# Patient Record
Sex: FEMALE | Race: WHITE | Employment: PART TIME | URBAN - METROPOLITAN AREA
[De-identification: names, ages, dates, MRNs, and addresses within clinical notes are randomized per-mention and may not be internally consistent; named-entity substitution may affect disease eponyms.]

---

## 2022-10-19 ENCOUNTER — OFFICE VISIT (OUTPATIENT)
Dept: INTERNAL MEDICINE CLINIC | Age: 42
End: 2022-10-19
Payer: MEDICAID

## 2022-10-19 VITALS
OXYGEN SATURATION: 96 % | HEART RATE: 81 BPM | HEIGHT: 62 IN | SYSTOLIC BLOOD PRESSURE: 126 MMHG | BODY MASS INDEX: 45.08 KG/M2 | DIASTOLIC BLOOD PRESSURE: 82 MMHG | WEIGHT: 245 LBS

## 2022-10-19 DIAGNOSIS — R42 DIZZINESS: ICD-10-CM

## 2022-10-19 DIAGNOSIS — E07.9 LUMP IN THYROID: ICD-10-CM

## 2022-10-19 DIAGNOSIS — M79.89 LEG SWELLING: Primary | ICD-10-CM

## 2022-10-19 DIAGNOSIS — Z12.31 ENCOUNTER FOR SCREENING MAMMOGRAM FOR MALIGNANT NEOPLASM OF BREAST: ICD-10-CM

## 2022-10-19 DIAGNOSIS — Z13.220 SCREENING FOR HYPERLIPIDEMIA: ICD-10-CM

## 2022-10-19 PROCEDURE — 99204 OFFICE O/P NEW MOD 45 MIN: CPT | Performed by: INTERNAL MEDICINE

## 2022-10-19 RX ORDER — IBUPROFEN 800 MG/1
800 TABLET ORAL
COMMUNITY
Start: 2021-11-05 | End: 2022-10-31

## 2022-10-19 RX ORDER — TOBRAMYCIN 3 MG/ML
SOLUTION/ DROPS OPHTHALMIC
COMMUNITY
Start: 2022-09-21

## 2022-10-19 ASSESSMENT — PATIENT HEALTH QUESTIONNAIRE - PHQ9
SUM OF ALL RESPONSES TO PHQ QUESTIONS 1-9: 0
SUM OF ALL RESPONSES TO PHQ QUESTIONS 1-9: 0
1. LITTLE INTEREST OR PLEASURE IN DOING THINGS: 0
SUM OF ALL RESPONSES TO PHQ QUESTIONS 1-9: 0
2. FEELING DOWN, DEPRESSED OR HOPELESS: 0
SUM OF ALL RESPONSES TO PHQ9 QUESTIONS 1 & 2: 0
SUM OF ALL RESPONSES TO PHQ QUESTIONS 1-9: 0

## 2022-10-19 NOTE — PROGRESS NOTES
Visit Information    Have you changed or started any medications since your last visit including any over-the-counter medicines, vitamins, or herbal medicines? no   Are you having any side effects from any of your medications? -  no  Have you stopped taking any of your medications? Is so, why? -  no    Have you seen any other physician or provider since your last visit? No  Have you had any other diagnostic tests since your last visit? No  Have you been seen in the emergency room and/or had an admission to a hospital since we last saw you? No  Have you had your routine dental cleaning in the past 6 months? no    Have you activated your [employer] account? If not, what are your barriers?  No:      Patient Care Team:  Kathy Alas MD as PCP - General (Internal Medicine)    Medical History Review  Past Medical, Family, and Social History reviewed and does contribute to the patient presenting condition    Health Maintenance   Topic Date Due    COVID-19 Vaccine (1) Never done    Varicella vaccine (1 of 2 - 2-dose childhood series) Never done    Depression Screen  Never done    HIV screen  Never done    Hepatitis C screen  Never done    DTaP/Tdap/Td vaccine (1 - Tdap) Never done    Cervical cancer screen  Never done    Lipids  Never done    Flu vaccine (1) Never done    Hepatitis A vaccine  Aged Out    Hib vaccine  Aged Out    Meningococcal (ACWY) vaccine  Aged Out    Pneumococcal 0-64 years Vaccine  Aged Out     SUBJECTIVE:  Naya  is a 43 y.o. female who  comes for complaints of   Chief Complaint   Patient presents with    New Patient     Been feeling dizzy and having SOB for a week    Leg Swelling     Both legs and are in pain feel like someone is squeezing them up to thighs, started months ago, hurts to walk       Specialities pt is following with:  Ortho- left knee OA, meniscus tear      Chronic conditions being monitored:    Fibromyalgia  Not on med    Concerns today:    Leg stiffness  swelling  Duration-1mth  Severity-moderate  Context-h/o OA and meniscus tear let kn  Associated signs and symptoms- SOB, dizziness- 1wk  Dizzy and off balance - lighthededness - 1 wek, no vertigo  No CP./palpitaion  Blurry vision right eye- fw mths- seen ophtha yesterday- diagnosed with eye infection from her contacts.- has follow up tomorrow. Modifying factors-worse when standing for a long time      Alcohol/smoking-   none    REVIEW OF SYSTEMS (except Subjective (HPI))    CONSTITUTIONAL: No weight loss, malaise or fevers  HEENT: Negative for frequent or significant headaches, No changes in hearing or vision, no nose bleeds or other nasal problems,  NECK: Negative for lumps, goiter, pain and significant neck swelling  RESPIRATORY: Negative for cough, hemoptysis, wheezing, or shortness of breath  CARDIOVASCULAR: Negative for chest pain, leg swelling,or palpitations  GI: No nausea, vomiting, or diarrhea or Constipation  : No history of dysuria, frequency or incontinence, or hematuria  MUSCULOSKELETAL: Negative for joint pain or swelling  SKIN: Negative for lesions, rash, and itching  PSYCH: Negative for sleep disturbance, mood disorder and recent psychosocial stressors  NEURO: No history of headaches, syncope, paralysis, seizures or tremors    ACTIVE PROBLEM LIST  There is no problem list on file for this patient. PAST MEDICAL HISTORY:    No past medical history on file. PAST SURGICAL HISTORY:    No past surgical history on file. FAMILY HISTORY:    No family history on file.      SOCIAL HISTORY:    Social History     Socioeconomic History    Marital status: Single     Spouse name: Not on file    Number of children: Not on file    Years of education: Not on file    Highest education level: Not on file   Occupational History    Not on file   Tobacco Use    Smoking status: Never    Smokeless tobacco: Never   Substance and Sexual Activity    Alcohol use: Not on file    Drug use: Not on file Sexual activity: Not on file   Other Topics Concern    Not on file   Social History Narrative    Not on file     Social Determinants of Health     Financial Resource Strain: Not on file   Food Insecurity: Not on file   Transportation Needs: Not on file   Physical Activity: Not on file   Stress: Not on file   Social Connections: Not on file   Intimate Partner Violence: Not on file   Housing Stability: Not on file       CURRENT MEDICATIONS:  Current Outpatient Medications   Medication Sig Dispense Refill    ibuprofen (ADVIL;MOTRIN) 800 MG tablet Take 800 mg by mouth every morning (before breakfast)      tobramycin (TOBREX) 0.3 % ophthalmic solution Instill 1 drop in right eye three times a day       No current facility-administered medications for this visit. OBJECTIVE:  Vitals:    10/19/22 1513   BP: 126/82   Pulse: 81   SpO2: 96%       Focal exam:  Thyroid lump- nn tender on the right onpalpation  General exam (except above):  Constitutional - well appearing, alert, in no acute distress  Eyes: Anicteric sclera. Pupils are equally round and reactive to light. Extraocular movements are intact. Skin: Skin color, texture, turgor normal  Respiratory - Lungs clear to auscultation. No wheezing, rhonchi, rales  Cardiovascular - RRR. S1S2 present. No leg swelling. Gastrointestinal - Abdomen soft, non-tender. Bowel sounds normal. No masses, organomegaly  Musculoskeletal - No joint swelling, deformity, or tenderness  Neuro - Pt Alert, awake and oriented x 3. No gross focal neurological deficits      ASSESSMENT AND PLAN (MEDICAL DECISION MAKING):  Irma Spencer was seen today for new patient and leg swelling. Diagnoses and all orders for this visit:    Leg swelling  Comments:  pt reports she has beentaking regular moril 200mg QID for last severla mth- check renal function  Orders:  -     Hemoglobin A1C; Future  -     Brain Natriuretic Peptide; Future  -     CBC; Future  -     Comprehensive Metabolic Panel;  Future  - Glucose, Fasting; Future  -     TSH With Reflex Ft4; Future    Screening for hyperlipidemia  -     Lipid Panel; Future    Dizziness  -     Hemoglobin A1C; Future  -     Brain Natriuretic Peptide; Future  -     CBC; Future  -     Comprehensive Metabolic Panel; Future  -     Glucose, Fasting; Future  -     TSH With Reflex Ft4; Future    Encounter for screening mammogram for malignant neoplasm of breast  -     [facility]ISON DIGITAL SCREEN W OR WO CAD BILATERAL;  Future    Lump in thyroid  -     US HEAD NECK SOFT TISSUE THYROID; Future          Follow up in: Keshia Roberts MD

## 2022-10-24 ENCOUNTER — HOSPITAL ENCOUNTER (OUTPATIENT)
Age: 42
Setting detail: SPECIMEN
Discharge: HOME | End: 2022-10-24

## 2022-10-24 DIAGNOSIS — R42 DIZZINESS: ICD-10-CM

## 2022-10-24 DIAGNOSIS — Z13.220 SCREENING FOR HYPERLIPIDEMIA: ICD-10-CM

## 2022-10-24 DIAGNOSIS — M79.89 LEG SWELLING: ICD-10-CM

## 2022-10-24 LAB
ALBUMIN SERPL-MCNC: 4.2 G/DL (ref 3.5–5.2)
ALBUMIN/GLOBULIN RATIO: 1.1 (ref 1–2.5)
ALP BLD-CCNC: 71 U/L (ref 35–104)
ALT SERPL-CCNC: 17 U/L (ref 5–33)
ANION GAP SERPL CALCULATED.3IONS-SCNC: 10 MMOL/L (ref 9–17)
AST SERPL-CCNC: 19 U/L
BILIRUB SERPL-MCNC: 0.5 MG/DL (ref 0.3–1.2)
BUN BLDV-MCNC: 15 MG/DL (ref 6–20)
CALCIUM SERPL-MCNC: 9.7 MG/DL (ref 8.6–10.4)
CHLORIDE BLD-SCNC: 100 MMOL/L (ref 98–107)
CHOLESTEROL/HDL RATIO: 5.6
CHOLESTEROL: 211 MG/DL
CO2: 24 MMOL/L (ref 20–31)
CREAT SERPL-MCNC: 0.63 MG/DL (ref 0.5–0.9)
ESTIMATED AVERAGE GLUCOSE: 108 MG/DL
GFR SERPL CREATININE-BSD FRML MDRD: >60 ML/MIN/1.73M2
GLUCOSE BLD-MCNC: 103 MG/DL (ref 70–99)
GLUCOSE FASTING: 103 MG/DL (ref 70–99)
HBA1C MFR BLD: 5.4 % (ref 4–6)
HCT VFR BLD CALC: 43.8 % (ref 36.3–47.1)
HDLC SERPL-MCNC: 38 MG/DL
HEMOGLOBIN: 14 G/DL (ref 11.9–15.1)
LDL CHOLESTEROL: 137 MG/DL (ref 0–130)
MCH RBC QN AUTO: 28.7 PG (ref 25.2–33.5)
MCHC RBC AUTO-ENTMCNC: 32 G/DL (ref 28.4–34.8)
MCV RBC AUTO: 89.8 FL (ref 82.6–102.9)
NRBC AUTOMATED: 0 PER 100 WBC
PDW BLD-RTO: 13.1 % (ref 11.8–14.4)
PLATELET # BLD: 451 K/UL (ref 138–453)
PMV BLD AUTO: 9.5 FL (ref 8.1–13.5)
POTASSIUM SERPL-SCNC: 4.7 MMOL/L (ref 3.7–5.3)
PRO-BNP: 47 PG/ML
RBC # BLD: 4.88 M/UL (ref 3.95–5.11)
SODIUM BLD-SCNC: 134 MMOL/L (ref 135–144)
TOTAL PROTEIN: 8 G/DL (ref 6.4–8.3)
TRIGL SERPL-MCNC: 182 MG/DL
TSH SERPL DL<=0.05 MIU/L-ACNC: 3.6 UIU/ML (ref 0.3–5)
WBC # BLD: 8.8 K/UL (ref 3.5–11.3)

## 2022-10-25 NOTE — RESULT ENCOUNTER NOTE
Fasting sugar borderline high-in prediabetes range, total cholesterol also little high, no need for medication for this at the moment  Recommend healthy diet and regular exercise

## 2022-10-26 ENCOUNTER — OFFICE VISIT (OUTPATIENT)
Dept: INTERNAL MEDICINE CLINIC | Age: 42
End: 2022-10-26
Payer: MEDICAID

## 2022-10-26 VITALS
WEIGHT: 243 LBS | DIASTOLIC BLOOD PRESSURE: 78 MMHG | HEIGHT: 62 IN | SYSTOLIC BLOOD PRESSURE: 128 MMHG | OXYGEN SATURATION: 98 % | HEART RATE: 78 BPM | BODY MASS INDEX: 44.72 KG/M2

## 2022-10-26 DIAGNOSIS — E78.2 MODERATE MIXED HYPERLIPIDEMIA NOT REQUIRING STATIN THERAPY: ICD-10-CM

## 2022-10-26 DIAGNOSIS — R73.01 ELEVATED FASTING GLUCOSE: ICD-10-CM

## 2022-10-26 DIAGNOSIS — M79.89 LEG SWELLING: Primary | ICD-10-CM

## 2022-10-26 PROCEDURE — 99213 OFFICE O/P EST LOW 20 MIN: CPT | Performed by: INTERNAL MEDICINE

## 2022-10-26 PROCEDURE — 93970 EXTREMITY STUDY: CPT | Performed by: INTERNAL MEDICINE

## 2022-10-26 RX ORDER — PREDNISOLONE ACETATE 10 MG/ML
SUSPENSION/ DROPS OPHTHALMIC
COMMUNITY
Start: 2022-10-21

## 2022-10-26 RX ORDER — ACETAMINOPHEN AND CODEINE PHOSPHATE 300; 30 MG/1; MG/1
TABLET ORAL
COMMUNITY
Start: 2022-10-23

## 2022-10-26 RX ORDER — AZITHROMYCIN 250 MG/1
TABLET, FILM COATED ORAL
COMMUNITY
Start: 2022-10-23

## 2022-10-26 SDOH — ECONOMIC STABILITY: FOOD INSECURITY: WITHIN THE PAST 12 MONTHS, YOU WORRIED THAT YOUR FOOD WOULD RUN OUT BEFORE YOU GOT MONEY TO BUY MORE.: NEVER TRUE

## 2022-10-26 SDOH — ECONOMIC STABILITY: FOOD INSECURITY: WITHIN THE PAST 12 MONTHS, THE FOOD YOU BOUGHT JUST DIDN'T LAST AND YOU DIDN'T HAVE MONEY TO GET MORE.: NEVER TRUE

## 2022-10-26 ASSESSMENT — SOCIAL DETERMINANTS OF HEALTH (SDOH): HOW HARD IS IT FOR YOU TO PAY FOR THE VERY BASICS LIKE FOOD, HOUSING, MEDICAL CARE, AND HEATING?: NOT HARD AT ALL

## 2022-10-26 NOTE — PROGRESS NOTES
Visit Information    Have you changed or started any medications since your last visit including any over-the-counter medicines, vitamins, or herbal medicines? no   Are you having any side effects from any of your medications? -  no  Have you stopped taking any of your medications? Is so, why? -  no    Have you seen any other physician or provider since your last visit? No  Have you had any other diagnostic tests since your last visit? No  Have you been seen in the emergency room and/or had an admission to a hospital since we last saw you? No  Have you had your routine dental cleaning in the past 6 months? no    Have you activated your [employer] account? If not, what are your barriers? No:      Patient Care Team:  Missy Valdez MD as PCP - General (Internal Medicine)  Missy Valdez MD as PCP - [facility]    Medical History Review  Past Medical, Family, and Social History reviewed and does contribute to the patient presenting condition    Health Maintenance   Topic Date Due    COVID-19 Vaccine (1) Never done    Varicella vaccine (1 of 2 - 2-dose childhood series) Never done    HIV screen  Never done    Hepatitis C screen  Never done    DTaP/Tdap/Td vaccine (1 - Tdap) Never done    Cervical cancer screen  Never done    Flu vaccine (1) Never done    Depression Screen  10/19/2023    Diabetes screen  10/24/2025    Lipids  10/24/2027    Hepatitis A vaccine  Aged Out    Hib vaccine  Aged Out    Meningococcal (ACWY) vaccine  Aged Out    Pneumococcal 0-64 years Vaccine  Aged Out       SUBJECTIVE:  Laurie Gomez is a 43 y.o. female patient who  comes for complaints of   Chief Complaint   Patient presents with    Leg Swelling    Discuss Labs     Leg swelling- see media  Looks like fat/ muscle pad  Pt reports unchanged in last 6mth  She has lost some weight- likey just more noticeable now.    Feels some calf muscl pulling sensatio when she stands      Here for lab results  Elevated fasting sugars- 103  A1c is 5.4  Discussed diet, exercise and annual monitoring      Hyperlipidemia    10 yr risk of AHA - 1.37- no need for meds at now   Discussed diet and exercise. Other labs good  BNP is normal  Legs still swollen        REVIEW OF SYSTEMS (except Subjective (HPI))  GENERAL: No fevers / chills  RESPIRATORY: Negative for cough, wheezing or shortness of breath  CARDIOVASCULAR: Negative for chest pain or palpitations. GI: no nausea, vomiting, or diarrhea  NEURO: No history of headaches    No past medical history on file.     SOCIAL HISTORY:  Social History     Socioeconomic History    Marital status: Single     Spouse name: Not on file    Number of children: Not on file    Years of education: Not on file    Highest education level: Not on file   Occupational History    Not on file   Tobacco Use    Smoking status: Never    Smokeless tobacco: Never   Substance and Sexual Activity    Alcohol use: Not on file    Drug use: Not on file    Sexual activity: Not on file   Other Topics Concern    Not on file   Social History Narrative    Not on file     Social Determinants of Health     Financial Resource Strain: Low Risk     Difficulty of Paying Living Expenses: Not hard at all   Food Insecurity: No Food Insecurity    Worried About Running Out of Food in the Last Year: Never true    Ran Out of Food in the Last Year: Never true   Transportation Needs: Not on file   Physical Activity: Not on file   Stress: Not on file   Social Connections: Not on file   Intimate Partner Violence: Not on file   Housing Stability: Not on file           CURRENT MEDICATIONS:  Current Outpatient Medications   Medication Sig Dispense Refill    acetaminophen-codeine (TYLENOL #3) 300-30 MG per tablet TAKE 1 TABLET BY MOUTH EVERY FOUR TO SIX HOURS AS NEEDED      azithromycin (ZITHROMAX) 250 MG tablet TAKE 2 TABLETS BY MOUTH ON DAY 1, THEN TAKE 1 TABLET BY MOUTH DAILY DAYS 2 THRU 5      prednisoLONE acetate (PRED FORTE) 1 % ophthalmic suspension INSTILL 1 DROP TWO TIMES A DAY IN RIGHT EYE      tobramycin (TOBREX) 0.3 % ophthalmic solution Instill 1 drop in right eye three times a day      ibuprofen (ADVIL;MOTRIN) 800 MG tablet Take 800 mg by mouth every morning (before breakfast) (Patient not taking: Reported on 10/26/2022)       No current facility-administered medications for this visit. OBJECTIVE:  Vitals:    10/26/22 1319   BP: 128/78   Pulse: 78   SpO2: 98%     Body mass index is 44.45 kg/m². General exam (except above):  General appearance - well appearing, alert, in no acute distress  Head - Atraumatic, normocephalic  Eyes - EOMI, no jaundice or pallor  Lungs - Lungs clear to auscultation. No wheezing, rhonchi, rales  Heart - RRR without murmur, gallop, or rubs. No ectopy  Abdomen - Abdomen soft, non-tender. Bowel sounds normal. No masses, organomegaly  Extremities -No significant edema, or skin discoloration. Good capillary refill. Neuro - Pt Alert, awake and oriented x 3. No gross focal neurological deficits    ASSESSMENT AND PLAN (MEDICAL DECISION MAKING):     Rosa Trammell was seen today for leg swelling and discuss labs.     Diagnoses and all orders for this visit:    Leg swelling  -     89033 - IN Duplex Extrem Venous, Bilat    Elevated fasting glucose    Moderate mixed hyperlipidemia not requiring statin therapy     Dizziness resolved    Follow up in: Dane Andrade MD

## 2022-11-11 ENCOUNTER — HOSPITAL ENCOUNTER (OUTPATIENT)
Dept: VASCULAR LAB | Age: 42
Discharge: HOME | End: 2022-11-11
Payer: MEDICAID

## 2022-11-11 ENCOUNTER — HOSPITAL ENCOUNTER (OUTPATIENT)
Dept: ULTRASOUND IMAGING | Age: 42
Discharge: HOME | End: 2022-11-13
Payer: MEDICAID

## 2022-11-11 DIAGNOSIS — M79.89 LEG SWELLING: ICD-10-CM

## 2022-11-11 DIAGNOSIS — E07.9 LUMP IN THYROID: ICD-10-CM

## 2022-11-11 PROCEDURE — 76536 US EXAM OF HEAD AND NECK: CPT

## 2022-11-11 PROCEDURE — 93970 EXTREMITY STUDY: CPT

## 2022-12-05 ENCOUNTER — HOSPITAL ENCOUNTER (OUTPATIENT)
Dept: WOMENS IMAGING | Age: 42
Discharge: HOME | End: 2022-12-07
Payer: MEDICAID

## 2022-12-05 DIAGNOSIS — Z12.31 ENCOUNTER FOR SCREENING MAMMOGRAM FOR MALIGNANT NEOPLASM OF BREAST: ICD-10-CM

## 2022-12-05 PROCEDURE — 77063 BREAST TOMOSYNTHESIS BI: CPT

## 2023-03-15 ENCOUNTER — HOSPITAL ENCOUNTER (OUTPATIENT)
Dept: GENERAL RADIOLOGY | Facility: CLINIC | Age: 43
Discharge: HOME | End: 2023-03-17
Payer: COMMERCIAL

## 2023-03-15 ENCOUNTER — OFFICE VISIT (OUTPATIENT)
Dept: INTERNAL MEDICINE CLINIC | Age: 43
End: 2023-03-15
Payer: COMMERCIAL

## 2023-03-15 ENCOUNTER — HOSPITAL ENCOUNTER (OUTPATIENT)
Facility: CLINIC | Age: 43
Discharge: HOME | End: 2023-03-17
Payer: COMMERCIAL

## 2023-03-15 VITALS
BODY MASS INDEX: 45.82 KG/M2 | HEART RATE: 93 BPM | WEIGHT: 249 LBS | DIASTOLIC BLOOD PRESSURE: 78 MMHG | SYSTOLIC BLOOD PRESSURE: 128 MMHG | OXYGEN SATURATION: 98 % | HEIGHT: 62 IN

## 2023-03-15 DIAGNOSIS — M25.561 CHRONIC PAIN OF RIGHT KNEE: ICD-10-CM

## 2023-03-15 DIAGNOSIS — G89.29 CHRONIC PAIN OF RIGHT KNEE: ICD-10-CM

## 2023-03-15 DIAGNOSIS — B37.31 VAGINAL THRUSH: ICD-10-CM

## 2023-03-15 DIAGNOSIS — J01.00 ACUTE NON-RECURRENT MAXILLARY SINUSITIS: Primary | ICD-10-CM

## 2023-03-15 PROCEDURE — 99214 OFFICE O/P EST MOD 30 MIN: CPT | Performed by: INTERNAL MEDICINE

## 2023-03-15 PROCEDURE — 73562 X-RAY EXAM OF KNEE 3: CPT

## 2023-03-15 RX ORDER — FLUCONAZOLE 150 MG/1
150 TABLET ORAL ONCE
Qty: 1 TABLET | Refills: 1 | Status: SHIPPED | OUTPATIENT
Start: 2023-03-15 | End: 2023-03-15

## 2023-03-15 RX ORDER — AMOXICILLIN AND CLAVULANATE POTASSIUM 875; 125 MG/1; MG/1
1 TABLET, FILM COATED ORAL 2 TIMES DAILY
Qty: 14 TABLET | Refills: 0 | Status: SHIPPED | OUTPATIENT
Start: 2023-03-15 | End: 2023-03-22

## 2023-03-15 RX ORDER — FLUTICASONE PROPIONATE 50 MCG
1 SPRAY, SUSPENSION (ML) NASAL DAILY
Qty: 32 G | Refills: 1 | Status: SHIPPED | OUTPATIENT
Start: 2023-03-15

## 2023-03-15 SDOH — ECONOMIC STABILITY: INCOME INSECURITY: HOW HARD IS IT FOR YOU TO PAY FOR THE VERY BASICS LIKE FOOD, HOUSING, MEDICAL CARE, AND HEATING?: NOT HARD AT ALL

## 2023-03-15 SDOH — ECONOMIC STABILITY: FOOD INSECURITY: WITHIN THE PAST 12 MONTHS, THE FOOD YOU BOUGHT JUST DIDN'T LAST AND YOU DIDN'T HAVE MONEY TO GET MORE.: NEVER TRUE

## 2023-03-15 SDOH — ECONOMIC STABILITY: HOUSING INSECURITY
IN THE LAST 12 MONTHS, WAS THERE A TIME WHEN YOU DID NOT HAVE A STEADY PLACE TO SLEEP OR SLEPT IN A SHELTER (INCLUDING NOW)?: NO

## 2023-03-15 SDOH — ECONOMIC STABILITY: FOOD INSECURITY: WITHIN THE PAST 12 MONTHS, YOU WORRIED THAT YOUR FOOD WOULD RUN OUT BEFORE YOU GOT MONEY TO BUY MORE.: NEVER TRUE

## 2023-03-15 ASSESSMENT — PATIENT HEALTH QUESTIONNAIRE - PHQ9
SUM OF ALL RESPONSES TO PHQ QUESTIONS 1-9: 0
2. FEELING DOWN, DEPRESSED OR HOPELESS: 0
SUM OF ALL RESPONSES TO PHQ QUESTIONS 1-9: 0
SUM OF ALL RESPONSES TO PHQ QUESTIONS 1-9: 0
1. LITTLE INTEREST OR PLEASURE IN DOING THINGS: 0
SUM OF ALL RESPONSES TO PHQ9 QUESTIONS 1 & 2: 0
SUM OF ALL RESPONSES TO PHQ QUESTIONS 1-9: 0

## 2023-03-15 NOTE — PROGRESS NOTES
Visit Information    Have you changed or started any medications since your last visit including any over-the-counter medicines, vitamins, or herbal medicines? no   Are you having any side effects from any of your medications? -  no  Have you stopped taking any of your medications? Is so, why? -  no    Have you seen any other physician or provider since your last visit? No  Have you had any other diagnostic tests since your last visit? No  Have you been seen in the emergency room and/or had an admission to a hospital since we last saw you? No  Have you had your routine dental cleaning in the past 6 months? no    Have you activated your [employer] account? If not, what are your barriers?  No:      Patient Care Team:  Brendolyn Rinne, MD as PCP - General (Internal Medicine)  Brendolyn Rinne, MD as PCP - Empaneled Provider    Medical History Review  Past Medical, Family, and Social History reviewed and does contribute to the patient presenting condition    Health Maintenance   Topic Date Due    COVID-19 Vaccine (1) Never done    Varicella vaccine (1 of 2 - 2-dose childhood series) Never done    HIV screen  Never done    Hepatitis C screen  Never done    DTaP/Tdap/Td vaccine (1 - Tdap) Never done    Cervical cancer screen  Never done    Flu vaccine (1) Never done    Depression Screen  10/19/2023    A1C test (Diabetic or Prediabetic)  10/24/2023    Lipids  10/24/2027    Hepatitis A vaccine  Aged Out    Hib vaccine  Aged Out    Meningococcal (ACWY) vaccine  Aged Out    Pneumococcal 0-64 years Vaccine  Aged Out     SUBJECTIVE:  Myrna Ardon is a 43 y.o. female patient who  comes for complaints of   Chief Complaint   Patient presents with    Results    Sinus Problem     Results  Us thyroid - tiny 5mm cst  Asymptomatic   Observation only    Soret throat,   1mth  Sometimes noted clumps of blood on blowing nose improved but still persisting  No fever, no cough  Periorbital heaviness nad tenderness  No SOB/CP      REVIEW OF SYSTEMS (except Subjective (HPI))  GENERAL: No fevers / chills  RESPIRATORY: Negative for cough, wheezing or shortness of breath  CARDIOVASCULAR: Negative for chest pain or palpitations.  GI: no nausea, vomiting, or diarrhea  NEURO: No history of headaches    No past medical history on file.    SOCIAL HISTORY:  Social History     Socioeconomic History    Marital status: Single     Spouse name: Not on file    Number of children: Not on file    Years of education: Not on file    Highest education level: Not on file   Occupational History    Not on file   Tobacco Use    Smoking status: Never    Smokeless tobacco: Never   Substance and Sexual Activity    Alcohol use: Not on file    Drug use: Not on file    Sexual activity: Not on file   Other Topics Concern    Not on file   Social History Narrative    Not on file     Social Determinants of Health     Financial Resource Strain: Low Risk     Difficulty of Paying Living Expenses: Not hard at all   Food Insecurity: No Food Insecurity    Worried About Running Out of Food in the Last Year: Never true    Ran Out of Food in the Last Year: Never true   Transportation Needs: Unknown    Lack of Transportation (Medical): Not on file    Lack of Transportation (Non-Medical): No   Physical Activity: Not on file   Stress: Not on file   Social Connections: Not on file   Intimate Partner Violence: Not on file   Housing Stability: Unknown    Unable to Pay for Housing in the Last Year: Not on file    Number of Places Lived in the Last Year: Not on file    Unstable Housing in the Last Year: No           CURRENT MEDICATIONS:  Current Outpatient Medications   Medication Sig Dispense Refill    acetaminophen-codeine (TYLENOL #3) 300-30 MG per tablet TAKE 1 TABLET BY MOUTH EVERY FOUR TO SIX HOURS AS NEEDED      azithromycin (ZITHROMAX) 250 MG tablet TAKE 2 TABLETS BY MOUTH ON DAY 1, THEN TAKE 1 TABLET BY MOUTH DAILY DAYS 2 THRU 5      prednisoLONE acetate (PRED FORTE) 1 % ophthalmic suspension INSTILL 1  DROP TWO TIMES A DAY IN RIGHT EYE      tobramycin (TOBREX) 0.3 % ophthalmic solution Instill 1 drop in right eye three times a day      ibuprofen (ADVIL;MOTRIN) 800 MG tablet Take 800 mg by mouth every morning (before breakfast) (Patient not taking: No sig reported)       No current facility-administered medications for this visit. OBJECTIVE:  Vitals:    03/15/23 1612   BP: 128/78   Pulse: 93   SpO2: 98%     Body mass index is 45.54 kg/m². Focal exam:  Throat - normal  Left maxillary sinus tenderness  Chest clear    General exam (except above):  General appearance - well appearing, alert, in no acute distress  Head - Atraumatic, normocephalic  Eyes - EOMI, no jaundice or pallor  Lungs - Lungs clear to auscultation. No wheezing, rhonchi, rales  Heart - RRR without murmur, gallop, or rubs. No ectopy  Abdomen - Abdomen soft, non-tender. Bowel sounds normal. No masses, organomegaly  Extremities -No significant edema, or skin discoloration. Good capillary refill. Neuro - Pt Alert, awake and oriented x 3. No gross focal neurological deficits    ASSESSMENT AND PLAN (MEDICAL DECISION MAKING):     Jatinder Caruso was seen today for results and sinus problem. Diagnoses and all orders for this visit:    Acute non-recurrent maxillary sinusitis  -     amoxicillin-clavulanate (AUGMENTIN) 875-125 MG per tablet; Take 1 tablet by mouth 2 times daily for 7 days  -     fluticasone (FLONASE) 50 MCG/ACT nasal spray; 1 spray by Each Nostril route daily    Chronic pain of right knee  Comments:  many years, worsening   Orders:  -     XR KNEE RIGHT (3 VIEWS); Future  -     Mindy Suarez MD, Orthopedic Surgery, [place]    Vaginal thrush  -     fluconazole (DIFLUCAN) 150 MG tablet;  Take 1 tablet by mouth once for 1 dose       Follow up in: prn      Jaqueline Christianson MD

## 2023-03-17 NOTE — RESULT ENCOUNTER NOTE
Long-term degenerative changes of the right knee, with small amount of fluid which is related to osteoarthritis

## 2023-03-23 ENCOUNTER — OFFICE VISIT (OUTPATIENT)
Dept: ORTHOPEDIC SURGERY | Age: 43
End: 2023-03-23

## 2023-03-23 VITALS — RESPIRATION RATE: 14 BRPM | HEIGHT: 62 IN | BODY MASS INDEX: 45.08 KG/M2 | WEIGHT: 245 LBS

## 2023-03-23 DIAGNOSIS — M25.561 CHRONIC PAIN OF RIGHT KNEE: Primary | ICD-10-CM

## 2023-03-23 DIAGNOSIS — M17.11 PRIMARY OSTEOARTHRITIS OF RIGHT KNEE: ICD-10-CM

## 2023-03-23 DIAGNOSIS — G89.29 CHRONIC PAIN OF RIGHT KNEE: Primary | ICD-10-CM

## 2023-03-23 RX ORDER — FLUCONAZOLE 150 MG/1
150 TABLET ORAL ONCE
COMMUNITY
Start: 2023-03-15

## 2023-03-23 RX ORDER — CYCLOBENZAPRINE HCL 10 MG
10 TABLET ORAL NIGHTLY PRN
Qty: 30 TABLET | Refills: 0 | Status: CANCELLED | OUTPATIENT
Start: 2023-03-23 | End: 2023-04-02

## 2023-03-23 NOTE — LETTER
March 23, 2023       Quentin Cosme YOB: 1980   [address] Date of Visit:  3/23/2023       To Whom It May Concern: It is my medical opinion that Quentin Cosme should remain off work through Earl 3/26/23. She may return to work on Monday 3/27/2023 with no restrictions. If you have any questions or concerns, please don't hesitate to call.     Sincerely,        VELVET Stephens

## 2023-03-23 NOTE — PROGRESS NOTES
[facility], [address] [place] [address], [address] [place] [address], [id number] [place] [address]           Dept Phone: [phone]           Dept Fax:  [address]           Nelli Woodard          Dept Phone: [phone]           Dept Fax:  [phone]      Chief Compliant:  Chief Complaint   Patient presents with    Knee Pain     Right knee pain Hx: mult falls         History of Present Illness: This is a 43 y.o. female who presents to the clinic today for evaluation of had concerns including Knee Pain (Right knee pain Hx: mult falls ). Ms. Annemarie Baxter is a 3year-old female who presents for evaluation of chronic right knee pain. Patient reports she has had pain in this knee for a number of years but it has been significantly worse than her baseline over the last 3 to 4 months. States she has pain in the [facility] knee\" but greatest over the medial aspect. Associated with swelling significant limitation in range of motion and concerns for intermittent weakness. Patient reports that her pain does cause the knee to give way and she has had several falls especially with with a wet surfaces but no falls which she landed directly on his right knee. Patient was evaluated PCP who ordered x-rays of the right knee which demonstrated moderate osteoarthritis no evidence of acute fracture. Patient was referred to orthopedics for further evaluation. Patient does not recall any specific injury or trauma states she has had several falls as stated above but had pain well before any of those falls occurred.        Past History:    Current Outpatient Medications:     fluconazole (DIFLUCAN) 150 MG tablet, Take 150 mg by mouth once, Disp: , Rfl:     fluticasone (FLONASE) 50 MCG/ACT nasal spray, 1 spray by Each Nostril route daily, Disp: 32 g, Rfl: 1  Allergies   Allergen Reactions

## 2023-05-17 ENCOUNTER — OFFICE VISIT (OUTPATIENT)
Dept: ORTHOPEDIC SURGERY | Age: 43
End: 2023-05-17
Payer: COMMERCIAL

## 2023-05-17 ENCOUNTER — HOSPITAL ENCOUNTER (OUTPATIENT)
Age: 43
Setting detail: SPECIMEN
Discharge: HOME | End: 2023-05-17
Payer: COMMERCIAL

## 2023-05-17 VITALS — WEIGHT: 245 LBS | RESPIRATION RATE: 14 BRPM | BODY MASS INDEX: 45.08 KG/M2 | HEIGHT: 62 IN

## 2023-05-17 DIAGNOSIS — M25.561 CHRONIC PAIN OF RIGHT KNEE: ICD-10-CM

## 2023-05-17 DIAGNOSIS — M25.50 POLYARTHRALGIA: ICD-10-CM

## 2023-05-17 DIAGNOSIS — M23.91 INTERNAL DERANGEMENT OF MULTIPLE SITES OF RIGHT KNEE: Primary | ICD-10-CM

## 2023-05-17 DIAGNOSIS — G89.29 CHRONIC PAIN OF RIGHT KNEE: ICD-10-CM

## 2023-05-17 LAB
CRP SERPL HS-MCNC: 14 MG/L (ref 0–5)
ERYTHROCYTE [SEDIMENTATION RATE] IN BLOOD BY WESTERGREN METHOD: 28 MM/HR (ref 0–20)
RHEUMATOID FACT SER NEPH-ACNC: <10 IU/ML

## 2023-05-17 PROCEDURE — G8417 CALC BMI ABV UP PARAM F/U: HCPCS | Performed by: PHYSICIAN ASSISTANT

## 2023-05-17 PROCEDURE — 99213 OFFICE O/P EST LOW 20 MIN: CPT | Performed by: PHYSICIAN ASSISTANT

## 2023-05-17 PROCEDURE — 86431 RHEUMATOID FACTOR QUANT: CPT

## 2023-05-17 PROCEDURE — 85652 RBC SED RATE AUTOMATED: CPT

## 2023-05-17 PROCEDURE — 36415 COLL VENOUS BLD VENIPUNCTURE: CPT

## 2023-05-17 PROCEDURE — 86038 ANTINUCLEAR ANTIBODIES: CPT

## 2023-05-17 PROCEDURE — 86140 C-REACTIVE PROTEIN: CPT

## 2023-05-17 PROCEDURE — 86225 DNA ANTIBODY NATIVE: CPT

## 2023-05-17 PROCEDURE — G8427 DOCREV CUR MEDS BY ELIG CLIN: HCPCS | Performed by: PHYSICIAN ASSISTANT

## 2023-05-17 PROCEDURE — 86200 CCP ANTIBODY: CPT

## 2023-05-17 PROCEDURE — 1036F TOBACCO NON-USER: CPT | Performed by: PHYSICIAN ASSISTANT

## 2023-05-17 RX ORDER — MELOXICAM 15 MG/1
15 TABLET ORAL DAILY
Qty: 30 TABLET | Refills: 0 | Status: SHIPPED | OUTPATIENT
Start: 2023-05-17

## 2023-05-17 NOTE — PROGRESS NOTES
and Atraumatic  Nose: Normal  Eyes: Conjunctivae and EOM are normal  Neck: Normal range of motion Neck supple. Respiratory/Cardio: Effort normal. No respiratory distress. Musculoskeletal:    Right Knee:     Skin: warm and dry, no rash or erythema  Vasculature: 2+ pedal pulses bilaterally  Neuro: Sensation grossly intact to light touch diffusely  Alignment: Normal  Tenderness: Moderate tenderness to medial joint line. No tenderness to quad/patellar tendon, pes anserine bursa or posterior knee. Effusion: Trace    ROM: (Degrees)       A P       Extension  0 0       Flexion   115 120       Crepitation  Yes       Muscle strength:         Flexion   5      Extension  5      SLR   5        Extensor lag   y          Special testing:  y    Pain with deep knee flexion     y    Patellar grind       n    Patellar apprehension      n    Patellar glide         n    Lachman       n    Anterior drawer      n    Pivot shift       n    Posterior drawer      n    Dial test       n    Posterolateral drawer      n    Posterior Sag       n    MCL        n    LCL          y    Medial joint line tenderness     n    Lateral joint line tenderness     y    McMurrey's         Neurological: Patient is alert and oriented to person, place, and time. Normal strenght. No sensory deficit. Skin: Skin is warm and dry  Psychiatric: Behavior is normal. Thought content normal.  Nursing note and vitals reviewed. Labs and Imaging:     XR KNEE RIGHT (3 VIEWS)  Narrative: EXAMINATION:  THREE XRAY VIEWS OF THE RIGHT KNEE    3/15/2023 4:58 pm    COMPARISON:  None. HISTORY:  ORDERING SYSTEM PROVIDED HISTORY: Chronic pain of right knee  TECHNOLOGIST PROVIDED HISTORY:  Reason for Exam: pt states chronic right knee pain past injuries with falls  and mvas    FINDINGS:  No acute fracture or dislocation is present. There are mild-to-moderate  degenerative changes of the medial and patellofemoral joint compartments. Small joint effusion is noted.   No

## 2023-05-18 LAB
ANA SER QL IA: NEGATIVE
CCP AB SER IA-ACNC: 1.4 U/ML (ref 0–7)
DSDNA IGG SER QL IA: 5.1 IU/ML
NUCLEAR IGG SER IA-RTO: 0.4 U/ML

## 2023-05-19 ENCOUNTER — TELEPHONE (OUTPATIENT)
Dept: ORTHOPEDIC SURGERY | Age: 43
End: 2023-05-19

## 2023-05-19 DIAGNOSIS — M25.50 POLYARTHRALGIA: Primary | ICD-10-CM

## 2023-05-19 NOTE — TELEPHONE ENCOUNTER
I spoke with patient on results, and referred her to rheumatology. Phone number provided to patient to call for an appointment.

## 2023-05-19 NOTE — TELEPHONE ENCOUNTER
----- Message from Patricia Lawson, [address] sent at 5/19/2023  2:11 PM EDT -----  Sed rate and CRP are mildly elevated.   Based on examination low concern for infectious process however given patient's multiple polyarthralgia would advise referral to rheumatology to rule out autoimmune or inflammatory condition

## 2023-05-22 ENCOUNTER — TELEPHONE (OUTPATIENT)
Dept: ADMINISTRATIVE | Age: 43
End: 2023-05-22

## 2023-05-22 ENCOUNTER — TELEPHONE (OUTPATIENT)
Dept: ORTHOPEDIC SURGERY | Age: 43
End: 2023-05-22

## 2023-05-22 NOTE — TELEPHONE ENCOUNTER
Called patient and let her know to check with her insurance company to see what Rheumatologist is on plan and whom she can get in the quickest.  Then call us back with their name and fax number and we will send over the referral and her notes. Lukas Jang,  Did you fax over all the information to Dr. Raquel Block office? Don't see fax confirmation in chart.

## 2023-05-22 NOTE — TELEPHONE ENCOUNTER
Pt called in regarding her referral she got from [facility] to see Rheumatology.  States she called this office today and they said they did not have any referral from us and the earliest appt they could get the pt in was Nov.    Pt is asking if there was another provider she could be referred to       Please call pt back @ [phone]

## 2023-05-23 NOTE — TELEPHONE ENCOUNTER
I spoke with patient, she stated she will be calling insurance today to see who is covered under her insurance for rheumatology.

## 2023-05-31 ENCOUNTER — HOSPITAL ENCOUNTER (OUTPATIENT)
Dept: MRI IMAGING | Age: 43
Discharge: HOME | End: 2023-06-02
Payer: COMMERCIAL

## 2023-05-31 DIAGNOSIS — M23.91 INTERNAL DERANGEMENT OF MULTIPLE SITES OF RIGHT KNEE: ICD-10-CM

## 2023-05-31 PROCEDURE — 73721 MRI JNT OF LWR EXTRE W/O DYE: CPT

## 2023-06-08 ENCOUNTER — TELEPHONE (OUTPATIENT)
Dept: ORTHOPEDIC SURGERY | Age: 43
End: 2023-06-08

## 2023-06-23 ENCOUNTER — OFFICE VISIT (OUTPATIENT)
Dept: ORTHOPEDIC SURGERY | Age: 43
End: 2023-06-23
Payer: COMMERCIAL

## 2023-06-23 DIAGNOSIS — G89.29 CHRONIC PAIN OF RIGHT KNEE: Primary | ICD-10-CM

## 2023-06-23 DIAGNOSIS — M25.561 CHRONIC PAIN OF RIGHT KNEE: Primary | ICD-10-CM

## 2023-06-23 DIAGNOSIS — M23.91 INTERNAL DERANGEMENT OF MULTIPLE SITES OF RIGHT KNEE: ICD-10-CM

## 2023-06-23 PROCEDURE — G8417 CALC BMI ABV UP PARAM F/U: HCPCS | Performed by: ORTHOPAEDIC SURGERY

## 2023-06-23 PROCEDURE — 1036F TOBACCO NON-USER: CPT | Performed by: ORTHOPAEDIC SURGERY

## 2023-06-23 PROCEDURE — G8428 CUR MEDS NOT DOCUMENT: HCPCS | Performed by: ORTHOPAEDIC SURGERY

## 2023-06-23 PROCEDURE — 99213 OFFICE O/P EST LOW 20 MIN: CPT | Performed by: ORTHOPAEDIC SURGERY

## 2023-06-23 NOTE — PROGRESS NOTES
accordingly    Provider Attestation:  Jakub Caceres, personally performed the services described in this documentation. All medical record entries made by the scribe were at my direction and in my presence. I have reviewed the chart and discharge instructions and agree that the records reflect my personal performance and is accurate and complete. Katrina Barry MD. 06/23/23      Please note that this chart was generated using voice recognition Dragon dictation software. Although every effort was made to ensure the accuracy of this automated transcription, some errors in transcription may have occurred.

## 2023-07-06 NOTE — H&P
treatment : yes   No recent falls or trauma. Currently no redness,  or rashes. Pt denies fever/chills,chest pain or SOB. No Hx of MRSA infections in the past.    RECENT IMAGING R/T HPI     MRI KNEE RIGHT WO CONTRAST [[id number]]  FINDINGS:  MENISCI: Lateral meniscus demonstrates normal morphology and signal  characteristics. No lateral meniscus tear. Degeneration and outward subluxation of the posterior horn and body of the  medial meniscus without discrete tear. CRUCIATE LIGAMENTS: Anterior and posterior cruciate ligaments appear  continuous/intact. EXTENSOR MECHANISM: Mild multifocal patellar tendinosis. Distal quadriceps  tendon and patellar retinacula appear intact. LATERAL COLLATERAL LIGAMENT COMPLEX: Popliteus muscle/tendon, iliotibial  band, lateral collateral ligament, and biceps femoris appear intact. MEDIAL COLLATERAL LIGAMENT COMPLEX: Medial collateral ligament complex  appears continuous/intact. KNEE JOINT: Small joint effusion. Mild tricompartmental osteophyte spurring. Grade 3 medial compartment chondromalacia. Grade 2 lateral compartment chondromalacia. Patellofemoral compartment articular cartilage is preserved. Osseous alignment is normal.     No acute fracture or dislocation. BONE MARROW: Red marrow reconversion. SOFT TISSUES: No sizable Baker's cyst.  Visualized popliteal neurovascular  bundle grossly unremarkable. Mild edema in the subcutaneous fat along the  anterior knee. IMPRESSION:  1. Degeneration and outward subluxation of the posterior horn and body of the  medial meniscus without discrete tear. No medial or lateral meniscus tear. 2. Tricompartmental osteoarthrosis. Moderate medial and mild lateral  compartment chondromalacia. 3. Small joint effusion. 4. Red marrow reconversion. 5. Mild multifocal patellar tendinosis. No acute ligamentous injury.           Review of additional significant medical hx:  Asthma   Current

## 2023-07-07 ENCOUNTER — HOSPITAL ENCOUNTER (OUTPATIENT)
Dept: PREADMISSION TESTING | Age: 43
End: 2023-07-07
Attending: ORTHOPAEDIC SURGERY | Admitting: ORTHOPAEDIC SURGERY
Payer: COMMERCIAL

## 2023-07-07 VITALS
SYSTOLIC BLOOD PRESSURE: 145 MMHG | BODY MASS INDEX: 44.16 KG/M2 | RESPIRATION RATE: 18 BRPM | DIASTOLIC BLOOD PRESSURE: 91 MMHG | HEART RATE: 73 BPM | OXYGEN SATURATION: 98 % | WEIGHT: 240 LBS | HEIGHT: 62 IN | TEMPERATURE: 98 F

## 2023-07-07 DIAGNOSIS — Z01.818 PREOP EXAMINATION: ICD-10-CM

## 2023-07-07 LAB
ANION GAP SERPL CALCULATED.3IONS-SCNC: 10 MMOL/L (ref 9–17)
BASOPHILS # BLD: 0.1 K/UL (ref 0–0.2)
BASOPHILS NFR BLD: 1 % (ref 0–2)
BUN SERPL-MCNC: 12 MG/DL (ref 6–20)
CALCIUM SERPL-MCNC: 9.8 MG/DL (ref 8.6–10.4)
CHLORIDE SERPL-SCNC: 101 MMOL/L (ref 98–107)
CO2 SERPL-SCNC: 26 MMOL/L (ref 20–31)
CREAT SERPL-MCNC: 0.66 MG/DL (ref 0.5–0.9)
EOSINOPHIL # BLD: 0.1 K/UL (ref 0–0.4)
EOSINOPHILS RELATIVE PERCENT: 2 % (ref 0–4)
ERYTHROCYTE [DISTWIDTH] IN BLOOD BY AUTOMATED COUNT: 13.8 % (ref 11.5–14.9)
GFR SERPL CREATININE-BSD FRML MDRD: >60 ML/MIN/1.73M2
GLUCOSE SERPL-MCNC: 106 MG/DL (ref 70–99)
HCT VFR BLD AUTO: 38.7 % (ref 36–46)
HGB BLD-MCNC: 13.1 G/DL (ref 12–16)
LYMPHOCYTES # BLD: 34 % (ref 24–44)
LYMPHOCYTES NFR BLD: 2.7 K/UL (ref 1–4.8)
MCH RBC QN AUTO: 29 PG (ref 26–34)
MCHC RBC AUTO-ENTMCNC: 33.8 G/DL (ref 31–37)
MCV RBC AUTO: 86 FL (ref 80–100)
MONOCYTES NFR BLD: 0.6 K/UL (ref 0.1–1.3)
MONOCYTES NFR BLD: 7 % (ref 1–7)
NEUTROPHILS NFR BLD: 56 % (ref 36–66)
NEUTS SEG NFR BLD: 4.7 K/UL (ref 1.3–9.1)
PLATELET # BLD AUTO: 414 K/UL (ref 150–450)
PMV BLD AUTO: 7.7 FL (ref 6–12)
POTASSIUM SERPL-SCNC: 4.5 MMOL/L (ref 3.7–5.3)
RBC # BLD AUTO: 4.5 M/UL (ref 4–5.2)
SODIUM SERPL-SCNC: 137 MMOL/L (ref 135–144)
WBC OTHER # BLD: 8.2 K/UL (ref 3.5–11)

## 2023-07-07 PROCEDURE — 80048 BASIC METABOLIC PNL TOTAL CA: CPT

## 2023-07-07 PROCEDURE — 93005 ELECTROCARDIOGRAM TRACING: CPT | Performed by: ANESTHESIOLOGY

## 2023-07-07 PROCEDURE — 36415 COLL VENOUS BLD VENIPUNCTURE: CPT

## 2023-07-07 PROCEDURE — 85027 COMPLETE CBC AUTOMATED: CPT

## 2023-07-07 PROCEDURE — APPSS45 APP SPLIT SHARED TIME 31-45 MINUTES: Performed by: NURSE PRACTITIONER

## 2023-07-07 RX ORDER — ACETAMINOPHEN 325 MG/1
650 TABLET ORAL EVERY 6 HOURS PRN
COMMUNITY

## 2023-07-07 ASSESSMENT — ENCOUNTER SYMPTOMS
SINUS PRESSURE: 1
GASTROINTESTINAL NEGATIVE: 1
RESPIRATORY NEGATIVE: 1
SINUS PAIN: 1

## 2023-07-07 NOTE — DISCHARGE INSTRUCTIONS
Pre-op Instructions For Out-Patient Surgery    Medication Instructions:  Please stop herbs and any supplements now (includes vitamins and minerals). Please contact your surgeon and prescribing physician for pre-op instructions for any blood thinners. Stop Meloxicam as directed    If you have inhalers/aerosol treatments at home, please use them the morning of your surgery and bring the inhalers with you to the hospital.    Please take the following medications the morning of your surgery with a sip of water:    None    Surgery Instructions:  After midnight before surgery:  Do not eat or drink anything, including water, mints, gum, and hard candy. You may brush your teeth without swallowing. No smoking, chewing tobacco, or street drugs. Please shower or bathe before surgery. If you were given Surgical Scrub Chlorhexidine Gluconate Liquid (CHG), please shower the night before and the morning of your surgery following the detailed instructions you received during your pre-admission visit. Please do not wear any cologne, lotion, powder, deodorant, jewelry, piercings, perfume, makeup, nail polish, hair accessories, or hair spray on the day of surgery. Wear loose comfortable clothing. Leave your valuables at home. Bring a storage case for any glasses/contacts. An adult who is responsible for you MUST drive you home and should be with you for the first 24 hours after surgery. If having out-patient knee and foot surgeries, please arrange for planned crutches, walker, or wheelchair before arriving to the hospital.    The Day of Surgery:  Arrive at [facility] Surgery Entrance at the time directed by your surgeon and check in at the desk. If you have a living will or healthcare power of , please bring a copy. You will be taken to the pre-op holding area where you will be prepared for surgery.   A physical assessment will be performed by a nurse

## 2023-07-09 LAB
EKG ATRIAL RATE: 66 BPM
EKG P AXIS: 7 DEGREES
EKG P-R INTERVAL: 196 MS
EKG Q-T INTERVAL: 406 MS
EKG QRS DURATION: 82 MS
EKG QTC CALCULATION (BAZETT): 425 MS
EKG R AXIS: 18 DEGREES
EKG T AXIS: 25 DEGREES
EKG VENTRICULAR RATE: 66 BPM

## 2023-07-09 PROCEDURE — 93010 ELECTROCARDIOGRAM REPORT: CPT | Performed by: INTERNAL MEDICINE

## 2023-07-14 ENCOUNTER — TELEPHONE (OUTPATIENT)
Dept: ORTHOPEDIC SURGERY | Age: 43
End: 2023-07-14

## 2023-07-14 NOTE — TELEPHONE ENCOUNTER
Completed disability paperwork for patient, she did not sign an ELSI and has requested we mail her the [facility] paperwork. Mailed to address on file verified by patient.   Scanned paperwork to chart

## 2023-07-17 ENCOUNTER — ANESTHESIA EVENT (OUTPATIENT)
Dept: OPERATING ROOM | Age: 43
End: 2023-07-17
Payer: COMMERCIAL

## 2023-07-17 NOTE — PRE-PROCEDURE INSTRUCTIONS
Nothing to eat after midnight. Y  Are you taking any blood thinners? When was the last day? N  Make sure to use Hibiclens prior to surgery. Y  Remove any jewelry and body piercings. Y  Do you wear glasses? If so, please bring a case to store them in. Y  Are you having any Covid symptoms? N  Do you have any new rashes, infections, etc. that we should be aware of?N  Do you have a ride home the day of surgery? It cannot be a cab or medical transportation. Y  Verify surgery time and what time to arrive at hospital.1100

## 2023-07-18 ENCOUNTER — HOSPITAL ENCOUNTER (OUTPATIENT)
Age: 43
Setting detail: OUTPATIENT SURGERY
Discharge: HOME | End: 2023-07-18
Attending: ORTHOPAEDIC SURGERY | Admitting: ORTHOPAEDIC SURGERY
Payer: COMMERCIAL

## 2023-07-18 ENCOUNTER — ANESTHESIA (OUTPATIENT)
Dept: OPERATING ROOM | Age: 43
End: 2023-07-18
Payer: COMMERCIAL

## 2023-07-18 VITALS
DIASTOLIC BLOOD PRESSURE: 76 MMHG | WEIGHT: 240 LBS | HEIGHT: 62 IN | HEART RATE: 84 BPM | RESPIRATION RATE: 14 BRPM | TEMPERATURE: 97.1 F | SYSTOLIC BLOOD PRESSURE: 138 MMHG | BODY MASS INDEX: 44.16 KG/M2 | OXYGEN SATURATION: 96 %

## 2023-07-18 DIAGNOSIS — S83.241A ACUTE MEDIAL MENISCUS TEAR OF RIGHT KNEE, INITIAL ENCOUNTER: Primary | ICD-10-CM

## 2023-07-18 LAB — HCG, PREGNANCY URINE (POC): NEGATIVE

## 2023-07-18 PROCEDURE — 2500000003 HC RX 250 WO HCPCS: Performed by: ANESTHESIOLOGY

## 2023-07-18 PROCEDURE — 6370000000 HC RX 637 (ALT 250 FOR IP): Performed by: ANESTHESIOLOGY

## 2023-07-18 PROCEDURE — 3700000001 HC ADD 15 MINUTES (ANESTHESIA): Performed by: ORTHOPAEDIC SURGERY

## 2023-07-18 PROCEDURE — 2709999900 HC NON-CHARGEABLE SUPPLY: Performed by: ORTHOPAEDIC SURGERY

## 2023-07-18 PROCEDURE — 7100000001 HC PACU RECOVERY - ADDTL 15 MIN: Performed by: ORTHOPAEDIC SURGERY

## 2023-07-18 PROCEDURE — 6360000002 HC RX W HCPCS

## 2023-07-18 PROCEDURE — 3600000003 HC SURGERY LEVEL 3 BASE: Performed by: ORTHOPAEDIC SURGERY

## 2023-07-18 PROCEDURE — 7100000030 HC ASPR PHASE II RECOVERY - FIRST 15 MIN: Performed by: ORTHOPAEDIC SURGERY

## 2023-07-18 PROCEDURE — 7100000010 HC PHASE II RECOVERY - FIRST 15 MIN: Performed by: ORTHOPAEDIC SURGERY

## 2023-07-18 PROCEDURE — 6360000002 HC RX W HCPCS: Performed by: NURSE ANESTHETIST, CERTIFIED REGISTERED

## 2023-07-18 PROCEDURE — 81025 URINE PREGNANCY TEST: CPT

## 2023-07-18 PROCEDURE — 2580000003 HC RX 258: Performed by: ANESTHESIOLOGY

## 2023-07-18 PROCEDURE — 6360000002 HC RX W HCPCS: Performed by: ANESTHESIOLOGY

## 2023-07-18 PROCEDURE — 2500000003 HC RX 250 WO HCPCS: Performed by: NURSE ANESTHETIST, CERTIFIED REGISTERED

## 2023-07-18 PROCEDURE — 7100000000 HC PACU RECOVERY - FIRST 15 MIN: Performed by: ORTHOPAEDIC SURGERY

## 2023-07-18 PROCEDURE — 3600000013 HC SURGERY LEVEL 3 ADDTL 15MIN: Performed by: ORTHOPAEDIC SURGERY

## 2023-07-18 PROCEDURE — 7100000011 HC PHASE II RECOVERY - ADDTL 15 MIN: Performed by: ORTHOPAEDIC SURGERY

## 2023-07-18 PROCEDURE — 3700000000 HC ANESTHESIA ATTENDED CARE: Performed by: ORTHOPAEDIC SURGERY

## 2023-07-18 PROCEDURE — 6360000002 HC RX W HCPCS: Performed by: ORTHOPAEDIC SURGERY

## 2023-07-18 PROCEDURE — 7100000031 HC ASPR PHASE II RECOVERY - ADDTL 15 MIN: Performed by: ORTHOPAEDIC SURGERY

## 2023-07-18 RX ORDER — LIDOCAINE HYDROCHLORIDE 10 MG/ML
1 INJECTION, SOLUTION EPIDURAL; INFILTRATION; INTRACAUDAL; PERINEURAL
Status: COMPLETED | OUTPATIENT
Start: 2023-07-18 | End: 2023-07-18

## 2023-07-18 RX ORDER — SODIUM CHLORIDE 0.9 % (FLUSH) 0.9 %
5-40 SYRINGE (ML) INJECTION EVERY 12 HOURS SCHEDULED
Status: DISCONTINUED | OUTPATIENT
Start: 2023-07-18 | End: 2023-07-18 | Stop reason: HOSPADM

## 2023-07-18 RX ORDER — ONDANSETRON 2 MG/ML
INJECTION INTRAMUSCULAR; INTRAVENOUS PRN
Status: DISCONTINUED | OUTPATIENT
Start: 2023-07-18 | End: 2023-07-18 | Stop reason: SDUPTHER

## 2023-07-18 RX ORDER — GABAPENTIN 300 MG/1
300 CAPSULE ORAL ONCE
Status: COMPLETED | OUTPATIENT
Start: 2023-07-18 | End: 2023-07-18

## 2023-07-18 RX ORDER — ONDANSETRON 2 MG/ML
4 INJECTION INTRAMUSCULAR; INTRAVENOUS
Status: DISCONTINUED | OUTPATIENT
Start: 2023-07-18 | End: 2023-07-18 | Stop reason: HOSPADM

## 2023-07-18 RX ORDER — SODIUM CHLORIDE 9 MG/ML
INJECTION, SOLUTION INTRAVENOUS PRN
Status: DISCONTINUED | OUTPATIENT
Start: 2023-07-18 | End: 2023-07-18 | Stop reason: HOSPADM

## 2023-07-18 RX ORDER — DIPHENHYDRAMINE HYDROCHLORIDE 50 MG/ML
12.5 INJECTION INTRAMUSCULAR; INTRAVENOUS
Status: COMPLETED | OUTPATIENT
Start: 2023-07-18 | End: 2023-07-18

## 2023-07-18 RX ORDER — KETOROLAC TROMETHAMINE 30 MG/ML
30 INJECTION, SOLUTION INTRAMUSCULAR; INTRAVENOUS ONCE
Status: COMPLETED | OUTPATIENT
Start: 2023-07-18 | End: 2023-07-18

## 2023-07-18 RX ORDER — HYDROCODONE BITARTRATE AND ACETAMINOPHEN 5; 325 MG/1; MG/1
1 TABLET ORAL EVERY 6 HOURS PRN
Qty: 20 TABLET | Refills: 0 | Status: SHIPPED | OUTPATIENT
Start: 2023-07-18 | End: 2023-07-23

## 2023-07-18 RX ORDER — SODIUM CHLORIDE 9 MG/ML
INJECTION, SOLUTION INTRAVENOUS CONTINUOUS
Status: DISCONTINUED | OUTPATIENT
Start: 2023-07-18 | End: 2023-07-18 | Stop reason: HOSPADM

## 2023-07-18 RX ORDER — MORPHINE SULFATE 2 MG/ML
2 INJECTION, SOLUTION INTRAMUSCULAR; INTRAVENOUS EVERY 5 MIN PRN
Status: DISCONTINUED | OUTPATIENT
Start: 2023-07-18 | End: 2023-07-18

## 2023-07-18 RX ORDER — FENTANYL CITRATE 0.05 MG/ML
50 INJECTION, SOLUTION INTRAMUSCULAR; INTRAVENOUS EVERY 5 MIN PRN
Status: CANCELLED | OUTPATIENT
Start: 2023-07-18

## 2023-07-18 RX ORDER — SODIUM CHLORIDE 0.9 % (FLUSH) 0.9 %
5-40 SYRINGE (ML) INJECTION PRN
Status: DISCONTINUED | OUTPATIENT
Start: 2023-07-18 | End: 2023-07-18 | Stop reason: HOSPADM

## 2023-07-18 RX ORDER — MIDAZOLAM HYDROCHLORIDE 1 MG/ML
INJECTION INTRAMUSCULAR; INTRAVENOUS PRN
Status: DISCONTINUED | OUTPATIENT
Start: 2023-07-18 | End: 2023-07-18 | Stop reason: SDUPTHER

## 2023-07-18 RX ORDER — ONDANSETRON 2 MG/ML
INJECTION INTRAMUSCULAR; INTRAVENOUS
Status: COMPLETED
Start: 2023-07-18 | End: 2023-07-18

## 2023-07-18 RX ORDER — DEXAMETHASONE SODIUM PHOSPHATE 4 MG/ML
INJECTION, SOLUTION INTRA-ARTICULAR; INTRALESIONAL; INTRAMUSCULAR; INTRAVENOUS; SOFT TISSUE PRN
Status: DISCONTINUED | OUTPATIENT
Start: 2023-07-18 | End: 2023-07-18 | Stop reason: SDUPTHER

## 2023-07-18 RX ORDER — FENTANYL CITRATE 0.05 MG/ML
25 INJECTION, SOLUTION INTRAMUSCULAR; INTRAVENOUS EVERY 5 MIN PRN
Status: DISCONTINUED | OUTPATIENT
Start: 2023-07-18 | End: 2023-07-18 | Stop reason: HOSPADM

## 2023-07-18 RX ORDER — ONDANSETRON 2 MG/ML
4 INJECTION INTRAMUSCULAR; INTRAVENOUS
Status: COMPLETED | OUTPATIENT
Start: 2023-07-18 | End: 2023-07-18

## 2023-07-18 RX ORDER — FENTANYL CITRATE 50 UG/ML
INJECTION, SOLUTION INTRAMUSCULAR; INTRAVENOUS PRN
Status: DISCONTINUED | OUTPATIENT
Start: 2023-07-18 | End: 2023-07-18 | Stop reason: SDUPTHER

## 2023-07-18 RX ORDER — ACETAMINOPHEN 500 MG
1000 TABLET ORAL ONCE
Status: COMPLETED | OUTPATIENT
Start: 2023-07-18 | End: 2023-07-18

## 2023-07-18 RX ORDER — LIDOCAINE HYDROCHLORIDE 20 MG/ML
INJECTION, SOLUTION EPIDURAL; INFILTRATION; INTRACAUDAL; PERINEURAL PRN
Status: DISCONTINUED | OUTPATIENT
Start: 2023-07-18 | End: 2023-07-18 | Stop reason: SDUPTHER

## 2023-07-18 RX ORDER — HYDROCODONE BITARTRATE AND ACETAMINOPHEN 5; 325 MG/1; MG/1
1 TABLET ORAL
Status: COMPLETED | OUTPATIENT
Start: 2023-07-18 | End: 2023-07-18

## 2023-07-18 RX ORDER — ROPIVACAINE HYDROCHLORIDE 5 MG/ML
INJECTION, SOLUTION EPIDURAL; INFILTRATION; PERINEURAL PRN
Status: DISCONTINUED | OUTPATIENT
Start: 2023-07-18 | End: 2023-07-18 | Stop reason: ALTCHOICE

## 2023-07-18 RX ORDER — PROPOFOL 10 MG/ML
INJECTION, EMULSION INTRAVENOUS PRN
Status: DISCONTINUED | OUTPATIENT
Start: 2023-07-18 | End: 2023-07-18 | Stop reason: SDUPTHER

## 2023-07-18 RX ADMIN — MIDAZOLAM 2 MG: 1 INJECTION INTRAMUSCULAR; INTRAVENOUS at 13:27

## 2023-07-18 RX ADMIN — DIPHENHYDRAMINE HYDROCHLORIDE 12.5 MG: 50 INJECTION, SOLUTION INTRAMUSCULAR; INTRAVENOUS at 14:47

## 2023-07-18 RX ADMIN — GABAPENTIN 300 MG: 300 CAPSULE ORAL at 11:28

## 2023-07-18 RX ADMIN — ONDANSETRON 4 MG: 2 INJECTION INTRAMUSCULAR; INTRAVENOUS at 13:33

## 2023-07-18 RX ADMIN — HYDROCODONE BITARTRATE AND ACETAMINOPHEN 1 TABLET: 5; 325 TABLET ORAL at 15:44

## 2023-07-18 RX ADMIN — SODIUM CHLORIDE: 9 INJECTION, SOLUTION INTRAVENOUS at 11:36

## 2023-07-18 RX ADMIN — KETOROLAC TROMETHAMINE 30 MG: 30 INJECTION, SOLUTION INTRAMUSCULAR; INTRAVENOUS at 14:53

## 2023-07-18 RX ADMIN — PROPOFOL 200 MG: 10 INJECTION, EMULSION INTRAVENOUS at 13:29

## 2023-07-18 RX ADMIN — DEXAMETHASONE SODIUM PHOSPHATE 4 MG: 4 INJECTION INTRA-ARTICULAR; INTRALESIONAL; INTRAMUSCULAR; INTRAVENOUS; SOFT TISSUE at 13:33

## 2023-07-18 RX ADMIN — LIDOCAINE HYDROCHLORIDE 100 MG: 20 INJECTION, SOLUTION EPIDURAL; INFILTRATION; INTRACAUDAL; PERINEURAL at 13:29

## 2023-07-18 RX ADMIN — LIDOCAINE HYDROCHLORIDE 100 MG: 20 INJECTION, SOLUTION EPIDURAL; INFILTRATION; INTRACAUDAL; PERINEURAL at 13:43

## 2023-07-18 RX ADMIN — ONDANSETRON 4 MG: 2 INJECTION INTRAMUSCULAR; INTRAVENOUS at 14:26

## 2023-07-18 RX ADMIN — LIDOCAINE HYDROCHLORIDE 1 ML: 10 INJECTION, SOLUTION EPIDURAL; INFILTRATION; INTRACAUDAL; PERINEURAL at 11:36

## 2023-07-18 RX ADMIN — PROMETHAZINE HYDROCHLORIDE 6.25 MG: 25 INJECTION INTRAMUSCULAR; INTRAVENOUS at 15:01

## 2023-07-18 RX ADMIN — FENTANYL CITRATE 100 MCG: 50 INJECTION, SOLUTION INTRAMUSCULAR; INTRAVENOUS at 13:27

## 2023-07-18 RX ADMIN — ACETAMINOPHEN 1000 MG: 500 TABLET ORAL at 11:27

## 2023-07-18 ASSESSMENT — PAIN DESCRIPTION - ORIENTATION
ORIENTATION: RIGHT
ORIENTATION: RIGHT

## 2023-07-18 ASSESSMENT — PAIN SCALES - GENERAL
PAINLEVEL_OUTOF10: 7
PAINLEVEL_OUTOF10: 6
PAINLEVEL_OUTOF10: 6
PAINLEVEL_OUTOF10: 4
PAINLEVEL_OUTOF10: 7
PAINLEVEL_OUTOF10: 2
PAINLEVEL_OUTOF10: 5
PAINLEVEL_OUTOF10: 6

## 2023-07-18 ASSESSMENT — PAIN DESCRIPTION - ONSET
ONSET: PROGRESSIVE
ONSET: GRADUAL

## 2023-07-18 ASSESSMENT — PAIN - FUNCTIONAL ASSESSMENT: PAIN_FUNCTIONAL_ASSESSMENT: 0-10

## 2023-07-18 ASSESSMENT — PAIN DESCRIPTION - LOCATION
LOCATION: KNEE
LOCATION: KNEE

## 2023-07-18 ASSESSMENT — PAIN DESCRIPTION - PAIN TYPE
TYPE: SURGICAL PAIN
TYPE: SURGICAL PAIN

## 2023-07-18 ASSESSMENT — PAIN DESCRIPTION - DESCRIPTORS: DESCRIPTORS: PRESSURE

## 2023-07-18 NOTE — INTERVAL H&P NOTE
Update History & Physical    The patient's History and Physical of July 7, 2023 was reviewed with the patient and I examined the patient. There was no change. The surgical site was confirmed by the patient and me. Pt Undergoing for KNEE ARTHROSCOPY  PARTIAL MEDIAL MENISCECTOMY   RIGHT per Dr. Tish Mathews . Pt denies fever/chills,chest pain or SOB  Pt complain of redness in her bilateral inner thigh started today after she wash her self with the surgical soap   Pt NPO since the past midnight, pt states she drink few sips of water today  Pt has hx of MRSA infection in her face   Denies hx of blood clots. Denies hx of any personal or family hx of complications w/anesthesia  Physical exam remains unchanged including cardiac and pulmonary assessment  See nursing flow sheet for vital signs     Lab Results   Component Value Date    WBC 8.2 07/07/2023    HGB 13.1 07/07/2023    HCT 38.7 07/07/2023    MCV 86.0 07/07/2023     07/07/2023     Lab Results   Component Value Date/Time     07/07/2023 01:30 PM    K 4.5 07/07/2023 01:30 PM     07/07/2023 01:30 PM    CO2 26 07/07/2023 01:30 PM    BUN 12 07/07/2023 01:30 PM    CREATININE 0.66 07/07/2023 01:30 PM    GLUCOSE 106 07/07/2023 01:30 PM    CALCIUM 9.8 07/07/2023 01:30 PM    LABGLOM >60 07/07/2023 01:30 PM              Plan: The risks, benefits, expected outcome, and alternative to the recommended procedure have been discussed with the patient. Patient understands and wants to proceed with the procedure.      Electronically signed by EDGAR Campbell CNP on 7/18/2023 at 10:48 AM

## 2023-07-18 NOTE — ANESTHESIA PRE PROCEDURE
ALT 17 10/24/2022 10:04 AM       POC Tests: No results for input(s): POCGLU, POCNA, POCK, POCCL, POCBUN, POCHEMO, POCHCT in the last 72 hours. Coags: No results found for: PROTIME, INR, APTT    HCG (If Applicable):   Lab Results   Component Value Date    HCG NEGATIVE 07/18/2023        ABGs: No results found for: PHART, PO2ART, KWQ1YTM, JKT9KNL, BEART, Q9WOFMII     Type & Screen (If Applicable):  No results found for: LABABO, LABRH    Drug/Infectious Status (If Applicable):  No results found for: HIV, HEPCAB    COVID-19 Screening (If Applicable): No results found for: COVID19        Anesthesia Evaluation  Patient summary reviewed and Nursing notes reviewed no history of anesthetic complications:   Airway: Mallampati: III  TM distance: >3 FB   Neck ROM: full  Mouth opening: > = 3 FB   Dental: normal exam         Pulmonary:normal exam  breath sounds clear to auscultation  (+) asthma:                            Cardiovascular:Negative CV ROS            Rhythm: regular  Rate: normal                    Neuro/Psych:   Negative Neuro/Psych ROS              GI/Hepatic/Renal:   (+) morbid obesity          Endo/Other:    (+) : arthritis: OA., .                 Abdominal:             Vascular: negative vascular ROS. Other Findings:           Anesthesia Plan      general     ASA 3     (LMA v/s GETA)  Induction: intravenous. MIPS: Postoperative opioids intended and Prophylactic antiemetics administered. Anesthetic plan and risks discussed with patient. Plan discussed with CRNA.                     Lucho Orta MD   7/18/2023

## 2023-07-18 NOTE — OP NOTE
Operative Note      Patient: Aileen Vargas  YOB: 1980  MRN: [id number]    Date of Procedure: 7/18/2023    Pre-Op Diagnosis Codes:     * Acute medial meniscus tear of right knee, initial encounter [S83.241A]    Post-Op Diagnosis:  Small tear of the posterior horn medial meniscus near the root ligament with a grade IV chondromalacia area of the medial tibial plateau possible loose body removal       Procedure(s):  KNEE ARTHROSCOPY  PARTIAL MEDIAL MENISCECTOMY   RIGHT    Surgeon(s):  Mika Villaseñor MD    Assistant:   Resident: Manuelito Rodas DO    Anesthesia: General    Estimated Blood Loss (mL): Minimal    Complications: None    Specimens:   * No specimens in log *    Implants:  * No implants in log *      Drains: * No LDAs found *    Findings: Small tear near the root ligament of the medial meniscus but also degeneration near the medial tibial plateau of the meniscus with grade IV chondromalacia of the medial tibial plateau medial most aspect. There was a small osteochondral loose body present in the lateral compartment etiology unknown that was removed via the suction of the shaver        Detailed Description of Procedure:       Patient is a 43y.o. year old female who presents with a history of pain, locking, and giving away sensations of their right knee. Physical examination was positive for Gregory's examination. MRI was consistent with significant degeneration of the medial meniscus without focal evidence of meniscus tear also with significant chondromalacia of the medial compartment. . Having failed conservative treatment, it was advised that arthroscopic examination and treatment of their knee would be beneficial and consent was obtained with risk and benefits explained to the patient. The patient was taken tot he operative room and general anesthesia was administered. A tourniquet was placed to the operatives lower extremity and then placed in the leg snell.  The leg was exsanguinated and the

## 2023-07-31 ENCOUNTER — OFFICE VISIT (OUTPATIENT)
Dept: ORTHOPEDIC SURGERY | Age: 43
End: 2023-07-31

## 2023-07-31 DIAGNOSIS — Z98.890 S/P RIGHT KNEE ARTHROSCOPY: Primary | ICD-10-CM

## 2023-07-31 PROCEDURE — 99024 POSTOP FOLLOW-UP VISIT: CPT | Performed by: ORTHOPAEDIC SURGERY

## 2023-07-31 RX ORDER — IBUPROFEN 800 MG/1
800 TABLET ORAL 4 TIMES DAILY PRN
Qty: 120 TABLET | Refills: 5 | Status: SHIPPED | OUTPATIENT
Start: 2023-07-31

## 2023-07-31 NOTE — PROGRESS NOTES
Patient returns today status post right knee arthroscopy with partial (medial/lateral) meniscectomy. Patient has no major complaints other than expected tightness/swelling with ROM. Sharp/stabbing pain has improved. On exam, portal sites are without redness or drainage. No calf tenderness; negative Betzaida's sign. Motion is 0-100 degrees. No significant effusion. Assessment  Status post right knee arthroscopy    Plan  Patient given exercises to perform. Patient given activities/ motions to complete. Continue activities at home. Return to work. RTO PRN. Call with any future problems.

## 2023-08-06 PROBLEM — Z01.818 PREOP EXAMINATION: Status: RESOLVED | Noted: 2023-07-07 | Resolved: 2023-08-06

## 2023-08-10 ENCOUNTER — TELEPHONE (OUTPATIENT)
Dept: ORTHOPEDIC SURGERY | Age: 43
End: 2023-08-10

## 2023-08-10 NOTE — TELEPHONE ENCOUNTER
Patient had right knee arthroscopy with partial (medial/lateral) meniscectomy on 07/18/23. Patient states she it is painful to walk, is still experiencing swelling and tightness. Patient states she does not feel she would be able to RTW on 8/15 as expected and is wondering if she can have an extension off work until after next appt which is scheduled for 08/16. Please call patient to advise.

## 2023-08-16 ENCOUNTER — OFFICE VISIT (OUTPATIENT)
Dept: ORTHOPEDIC SURGERY | Age: 43
End: 2023-08-16

## 2023-08-16 VITALS — WEIGHT: 240 LBS | HEIGHT: 62 IN | BODY MASS INDEX: 44.16 KG/M2 | RESPIRATION RATE: 14 BRPM

## 2023-08-16 DIAGNOSIS — Z98.890 S/P RIGHT KNEE ARTHROSCOPY: Primary | ICD-10-CM

## 2023-08-16 PROCEDURE — 99024 POSTOP FOLLOW-UP VISIT: CPT | Performed by: PHYSICIAN ASSISTANT

## 2023-08-16 RX ORDER — METHYLPREDNISOLONE 4 MG/1
4 TABLET ORAL SEE ADMIN INSTRUCTIONS
Qty: 1 KIT | Refills: 0 | Status: SHIPPED | OUTPATIENT
Start: 2023-08-16 | End: 2023-08-22

## 2023-08-16 NOTE — PROGRESS NOTES
[address], Aurelia Menchaca [address] [facility], [address] [place] [address], [address] [place] [address] [address]           Dept Phone: [phone]           Dept Fax:  [phone] [address]          Dept Phone: [phone]           Dept Fax:  [phone]      Chief Compliant:  Chief Complaint   Patient presents with    Knee Pain     SP: R Knee        History of Present Illness: This is a 43 y.o. female who presents to the clinic today for evaluation of had concerns including Knee Pain (SP: R Knee). Ms. Erika Peoples is [id number] female who presents for evaluation of continued right knee pain following right knee arthroscopy partial medial meniscectomy on 7/18/2023. She reports that sharp stabbing pain she is having prior to surgery is actually quite a bit improved but she continues to have swelling, stiffness and concern for instability. She has been utilizing a cane over the last several weeks and she feels like this knee is going to give out. She has been doing the home exercises that were provided at the ED but feels like she needs more rigorous exercise to increase her strength before she is ready to return to her physical job. Also requesting a brace. No new injury or trauma no knee joint warmth, redness, fever or chills.        Past History:    Current Outpatient Medications:     methylPREDNISolone (MEDROL DOSEPACK) 4 MG tablet, Take 1 tablet by mouth See Admin Instructions for 6 days Take by mouth., Disp: 1 kit, Rfl: 0    ibuprofen (ADVIL;MOTRIN) 800 MG tablet, Take 1 tablet by mouth 4 times daily as needed for Pain, Disp: 120 tablet, Rfl: 5    acetaminophen (TYLENOL) 325 MG tablet, Take 2 tablets by mouth every 6 hours as needed for Pain (Patient not taking: Reported on 8/16/2023), Disp: , Rfl:     meloxicam (MOBIC) 15 MG tablet, Take 1 tablet by mouth daily,

## 2023-08-16 NOTE — PATIENT INSTRUCTIONS
Patient Education        Meniscus Tear: Exercises  Introduction  Here are some examples of exercises for you to try. The exercises may be suggested for a condition or for rehabilitation. Start each exercise slowly. Ease off the exercises if you start to have pain. You will be told when to start these exercises and which ones will work best for you. How to do the exercises  Calf wall stretch    Stand facing a wall with your hands on the wall at about eye level. Put your affected leg about a step behind your other leg. Keeping your back leg straight and your back heel on the floor, bend your front knee and gently bring your hip and chest toward the wall until you feel a stretch in the calf of your back leg. Hold the stretch for at least 15 to 30 seconds. Repeat 2 to 4 times. Repeat steps 1 through 4, but this time keep your back knee bent. Hamstring wall stretch    Lie on your back in a doorway, with your good leg through the open door. Slide your affected leg up the wall to straighten your knee. You should feel a gentle stretch down the back of your leg. Hold the stretch for at least 1 minute. Then over time, try to lengthen the time you hold the stretch to as long as 6 minutes. Repeat 2 to 4 times. If you do not have a place to do this exercise in a doorway, there is another way to do it:  Lie on your back, and bend your affected leg. Loop a towel under the ball and toes of that foot, and hold the ends of the towel in your hands. Straighten your knee, and slowly pull back on the towel. You should feel a gentle stretch down the back of your leg. Hold the stretch for at least 15 to 30 seconds. Or even better, hold the stretch for 1 minute if you can. Repeat 2 to 4 times. Do not arch your back. Do not bend either knee. Keep one heel touching the floor and the other heel touching the wall. Do not point your toes. Quad [employer] with your leg straight and supported on the floor or a firm bed.  (If you

## 2023-08-21 ENCOUNTER — HOSPITAL ENCOUNTER (OUTPATIENT)
Dept: PHYSICAL THERAPY | Age: 43
Setting detail: THERAPIES SERIES
End: 2023-08-21
Payer: COMMERCIAL

## 2023-08-23 NOTE — THERAPY EVALUATION
[address]   Outpatient Physical Therapy  Physical Therapy Lower Extremity Evaluation    Date:  2023  Patient: Billy Meyer  : 1980  MRN: [id number]  Physician: Sylvia Pedraza PA-C Insurance: [employer]. Auth required after initial evaluation  Medical Diagnosis: Z98.890 (ICD-10-CM) - S/P right knee arthroscopy   Rehab Codes: M25.561, M25.661, R53.1, R26.9  Onset Date: DOS 23   Next 's appt: PRN    Subjective:   CC: R knee pain, mobility deficits and instability s/p R knee arthroscopy 23  HPI: Pt reports chronic hx of R medial knee pain and instability that ultimately failed conservative management and imaging at the time showing degeneration/subluxation of pt's R medial meniscus. Pt ultimately elected for surgical mgmt and underwent a R arthroscopic partial medial menisectomy via Dr. Lisa Mello on 23. Since her procedure, pt reports good improvement in the sharp/stabbing joint line pain she was experiencing pre-operatively, but continues to report residual swelling, stiffness and instability. Describes recent burning sensation in her R infrapatellar region and feels stiff when trying to nabila her knee. Pt is still off work currently but will be returning to a  job where she has to be stand for 6-8 hour shifts, and expresses concern of not having enough strength or control of her knee. Pt was recently provided a hinged knee brace by her ortho office and pt now presents to PT at this facility for continued post-operative management. Pt requests aquatic therapy for strengthening with plan to return to work on 23.      PMHx: [] Unremarkable [] Diabetes [] HTN  [] Pacemaker   [] MI/Heart Problems [] Cancer [] Arthritis  [x] Other: pre-diabetic, asthma, hx of COVID-19              [x] Refer to full medical chart  In EPIC     Comorbidities:   [x] Obesity (BMI 43) [] Dialysis  [] Other:   [] Asthma/COPD [] Dementia [] Other:   [] Stroke [] Sleep apnea [] Other:   []

## 2023-08-24 ENCOUNTER — HOSPITAL ENCOUNTER (OUTPATIENT)
Dept: PHYSICAL THERAPY | Age: 43
Setting detail: THERAPIES SERIES
Discharge: HOME | End: 2023-08-24
Payer: COMMERCIAL

## 2023-08-24 PROCEDURE — 97161 PT EVAL LOW COMPLEX 20 MIN: CPT

## 2023-09-01 ENCOUNTER — HOSPITAL ENCOUNTER (OUTPATIENT)
Dept: PHYSICAL THERAPY | Age: 43
Setting detail: THERAPIES SERIES
Discharge: HOME | End: 2023-09-01
Payer: COMMERCIAL

## 2023-09-01 PROCEDURE — 97113 AQUATIC THERAPY/EXERCISES: CPT

## 2023-09-01 NOTE — FLOWSHEET NOTE
[x] [facility] & Therapy  [address]  [place]: [phone]   F: [phone]    Physical Therapy Daily Treatment Note    Date:  2023  Patient Name:  Fabrizio Faustin    :  1980  MRN: [id number]  Physician: Jennifer Shen PA-C          Insurance: [facility] Approval valid  - 10/27  Medical Diagnosis: Z98.890 (ICD-10-CM) - S/P right knee arthroscopy      Rehab Codes: M25.561, M25.661, R53.1, R26.9  Onset Date: DOS 23                  Next 's appt: PRN  Visit# / total visits: 2  Cancels/No Shows: 0/0    Subjective:  Pt reports pain in medial and inferior to patella along joint line of right knee. States pain this morning is about average. States having more aching the past few days. States forgot to put on her brace this morning before coming to therapy. Pain:  [x] Yes  [] No Location: R knee, medial knee @ joint line and across joint line below patella. Pain Rating: (0-10 scale) 4/10  Pain altered Tx:  [x] No  [] Yes  Action:  Comments: initial aquatic therapy visit. Educated on postural awareness, core stability and working in pain free ranges. Objective:  [address] [employer] Exercise Log  Aquatic Knee phase 1    Date of Eval:   23                             Primary PT: Zohaib Messer PT  Diagnosis: Things to Focus On (goals):   Surgical Precautions:  Medical Precautions:  [] C-9 dates  [] Occ Med   [] Medicare       Date 23       Visit # 2/12       Walk F/L/R 2 Laps       Marching 10x       Squats 10x5\"       Heel toe raises 10x       SLR F/L/R 10x       HS Curl 10x       Step-Ups F/L        Step Down F/L        Lunges F/L        Knee/Flex /Ext 10x               Kickboard Ex. Iso Abd.          Push-pull        Paddling                Balance        Tandem        SLS                DEEP H2O        Cycling        Jacks        X-Country        Hang                Stretches        Achllies

## 2023-09-06 ENCOUNTER — HOSPITAL ENCOUNTER (OUTPATIENT)
Dept: PHYSICAL THERAPY | Age: 43
Setting detail: THERAPIES SERIES
Discharge: HOME | End: 2023-09-06
Payer: COMMERCIAL

## 2023-09-06 PROCEDURE — 97113 AQUATIC THERAPY/EXERCISES: CPT

## 2023-09-06 NOTE — FLOWSHEET NOTE
[x] [facility] & Therapy  [address]  [place]: [phone]   F: [phone]    Physical Therapy Daily Treatment Note    Date:  2023  Patient Name:  Jesse Olmedo    :  1980  MRN: [id number]  Physician: Tavo Gerard PA-C          Insurance: [employer] Approval valid 8/29 - 10/27  Medical Diagnosis: Z98.890 (ICD-10-CM) - S/P right knee arthroscopy      Rehab Codes: M25.561, M25.661, R53.1, R26.9  Onset Date: DOS 23                  Next 's appt: PRN  Visit# / total visits: 3/12  Cancels/No Shows: 0/0    Subjective:  Pt reports able to walk to and from work to practice when she has too next week. States still having some aching pain across joint line just inferior to patella. Denies any high pain/sharp pain since surgery. Note minimal to no soreness after initial aquatic therapy visit. Pain:  [x] Yes  [] No Location: R knee, medial knee @ joint line and across joint line below patella. Pain Rating: (0-10 scale) 2/10  Pain altered Tx:  [x] No  [] Yes  Action:  Comments: reviewed postural awareness, core stability and working in pain free ranges. Objective:  [address] [facility] Exercise Log  Aquatic Knee phase 1    Date of Eval:   23                             Primary PT: Joy Nicolas, PT      Date 23      Visit # 2/12 3/12      Walk F/L/R 2 Laps 2 Laps      Marching 10x 12x      Squats 10x5\" Low 10x5\"      Heel toe raises 10x 12x      SLR F/L/R 10x 12x      HS Curl 10x 12x      Step-Ups F/L  Low 10x      Step Down F/L        Lunges F/L        Knee/Flex /Ext 10x 12x              Kickboard Ex.   Med      Iso Abd.   10x5\"      Push-pull  10x      Paddling                Balance        Tandem        SLS                DEEP H2O   Add     Cycling        Jacks        X-Country        Hang                Stretches        Achllies        Hamstring 2x20\" 2x20\"      Psoas        Quad/ knee flex @ step

## 2023-09-07 ENCOUNTER — HOSPITAL ENCOUNTER (OUTPATIENT)
Age: 43
Setting detail: SPECIMEN
Discharge: HOME | End: 2023-09-07

## 2023-09-07 ENCOUNTER — OFFICE VISIT (OUTPATIENT)
Dept: OBGYN CLINIC | Age: 43
End: 2023-09-07

## 2023-09-07 VITALS
DIASTOLIC BLOOD PRESSURE: 78 MMHG | BODY MASS INDEX: 47.11 KG/M2 | WEIGHT: 256 LBS | SYSTOLIC BLOOD PRESSURE: 118 MMHG | HEIGHT: 62 IN

## 2023-09-07 DIAGNOSIS — Z12.31 ENCOUNTER FOR SCREENING MAMMOGRAM FOR MALIGNANT NEOPLASM OF BREAST: ICD-10-CM

## 2023-09-07 DIAGNOSIS — Z01.419 WELL FEMALE EXAM WITH ROUTINE GYNECOLOGICAL EXAM: Primary | ICD-10-CM

## 2023-09-07 DIAGNOSIS — Z11.51 SPECIAL SCREENING EXAMINATION FOR HUMAN PAPILLOMAVIRUS (HPV): ICD-10-CM

## 2023-09-07 DIAGNOSIS — T83.32XA INTRAUTERINE CONTRACEPTIVE DEVICE THREADS LOST, INITIAL ENCOUNTER: ICD-10-CM

## 2023-09-07 NOTE — PROGRESS NOTES
or melena  Genito-Urinary ROS: No Dysuria, Hematuria or Nocturia. No Urinary Incontinence or Vaginal Discharge  Musculoskeletal ROS: No Arthralgia, Arthritis,Gout,Osteoporosis or Rheumatism  Neurological ROS: No CVA, Migraines, Epilepsy, Seizure Hx, or Limb Weakness  Dermatological ROS: No Rash, Itching, Hives, Mole Changes or Cancer                                                                                                                                                                                                                                  PHYSICAL Exam:     Constitutional:  Vitals:    09/07/23 1153   BP: 118/78   Site: Left Upper Arm   Position: Sitting   Cuff Size: Large Adult   Weight: 256 lb (116.1 kg)   Height: 5' 2\" (1.575 m)       Chaperone for Intimate Exam  Chaperone was offered and accepted as part of the rooming process. Chaperone: Lorie CHINCHILLA          General Appearance: This  is a well Developed, well Nourished, well groomed female. Her BMI was reviewed. Nutritional decision making was discussed. Skin:  There was a Normal Inspection of the skin without rashes or lesions. There were no rashes. (Papular, Maculopapular, Hives, Pustular, Macular)     There were no lesions (Ulcers, Erythema, Abn. Appearing Nevi)            Lymphatic:  No Lymph Nodes were Palpable in the neck , axilla or groin.  0 # Of Lymph Nodes; Location ; Character [Normal]  [Shotty] [Tender] [Enlarged]     Neck and EENT:  The neck was supple. There were no masses   The thyroid was not enlarged and had no masses. Perrla, EOMI B/L, TMI B/L No Abnormalities. Throat inspected-No exudates or Masses, Nares Patent No Masses        Respiratory: The lungs were auscultated and found to be clear. There were no rales, rhonchi or wheezes. There was a good respiratory effort. Cardiovascular: The heart was in a regular rate and rhythm. . No S3 or S4. There was no murmur appreciated.  Location, grade, and radiation are not

## 2023-09-08 ENCOUNTER — TELEPHONE (OUTPATIENT)
Dept: ORTHOPEDIC SURGERY | Age: 43
End: 2023-09-08

## 2023-09-08 ENCOUNTER — HOSPITAL ENCOUNTER (OUTPATIENT)
Dept: PHYSICAL THERAPY | Age: 43
Setting detail: THERAPIES SERIES
Discharge: HOME | End: 2023-09-08
Payer: COMMERCIAL

## 2023-09-08 NOTE — FLOWSHEET NOTE
[x] [address] [facility] & Therapy  [address]  [place]: [phone]   F: [phone]     Physical Therapy Cancel/No Show note    Date: 2023  Patient: Jamari Esposito  : 1980  MRN: [id number]    Visit Count: 3/12  Cancels/No Shows to date:     For today's appointment patient:    [x]  Cancelled    [] Rescheduled appointment    [] No-show     Reason given by patient:    []  Patient ill    []  Conflicting appointment    [x] No transportation      [] Conflict with work    [] No reason given    [] Weather related    [] -    [x] Other:      Comments:  Cab never picked her up      [x] Next appointment was confirmed    Electronically signed by: Jing Ramirez PTA

## 2023-09-08 NOTE — TELEPHONE ENCOUNTER
Patient called to request 2 more weeks off work due to only having 2 visits with PT due to waiting for approval for the PT so she just started. Letter can be emailed to pt and she can print it off and take it to her employer.      Please advise and call pt at [phone], thank you

## 2023-09-11 NOTE — TELEPHONE ENCOUNTER
Letter was created and sent to [address] [place] [address]. I contacted the patient and left a message letting her know this. I also let her know that the letter will be available in the [facility] as well.

## 2023-09-12 ENCOUNTER — HOSPITAL ENCOUNTER (OUTPATIENT)
Dept: PHYSICAL THERAPY | Age: 43
Setting detail: THERAPIES SERIES
Discharge: HOME | End: 2023-09-12
Payer: COMMERCIAL

## 2023-09-12 LAB
HPV I/H RISK 4 DNA CVX QL NAA+PROBE: NOT DETECTED
HPV SAMPLE: NORMAL
HPV, INTERPRETATION: NORMAL
HPV16 DNA CVX QL NAA+PROBE: NOT DETECTED
HPV18 DNA CVX QL NAA+PROBE: NOT DETECTED
SPECIMEN DESCRIPTION: NORMAL

## 2023-09-12 PROCEDURE — 97113 AQUATIC THERAPY/EXERCISES: CPT

## 2023-09-12 NOTE — FLOWSHEET NOTE
[x] [facility] & Therapy  [address]  [place]: [phone]   F: [phone]    Physical Therapy Daily Treatment Note    Date:  2023  Patient Name:  Justa Guerrero    :  1980  MRN: [id number]  Physician: Tiffany Zheng PA-C          Insurance: [employer]. Approval valid 8/29 - 10/27  Medical Diagnosis: Z98.890 (ICD-10-CM) - S/P right knee arthroscopy      Rehab Codes: M25.561, M25.661, R53.1, R26.9  Onset Date: DOS 23                  Next 's appt: PRN  Visit# / total visits:   Cancels/No Shows: 0/0    Subjective:  Pt reports increased soreness in left knee this morning and general tightness and aching in (B) knees. States no issues after last visit and reports better tolerance to activity since starting therapy. Pain:  [x] Yes  [] No Location: R knee, medial knee @ joint line and across joint line below patella. Pain Rating: (0-10 scale) 2/10 R knee stiff, 3/10 L knee aching  Pain altered Tx:  [x] No  [] Yes  Action:  Comments: reviewed postural awareness, core stability and working in pain free ranges. Objective:  [address] Exercise Log  Aquatic Knee phase 1    Date of Eval:   23                             Primary PT: Ruddy Gutierrez, PT      Date 23     Visit # 2/12 3/12 4/12     Walk F/L/R 2 Laps 2 Laps 2 Laps     Marching 10x 12x 12x     Squats 10x5\" Low 10x5\" Low 10x5\"     Heel toe raises 10x 12x 12x     SLR F/L/R 10x 12x 12x     HS Curl 10x 12x 12x     Step-Ups F/L  Low 10x Low 12x     Step Down F/L        Lunges F/L        Knee/Flex /Ext 10x 12x 12x             Kickboard Ex.   Med Med     Iso Abd.   10x5\" 10x5\"     Push-pull  10x 10x     Paddling                Balance        Tandem        SLS                DEEP H2O   1 Noodle     Cycling   1'     Jacks        X-Country   1'     Hang   3'             Stretches        Achllies        Hamstring 2x20\" 2x20\" 2x20\"     Psoas

## 2023-09-14 ENCOUNTER — HOSPITAL ENCOUNTER (OUTPATIENT)
Dept: PHYSICAL THERAPY | Age: 43
Setting detail: THERAPIES SERIES
Discharge: HOME | End: 2023-09-14
Payer: COMMERCIAL

## 2023-09-14 PROCEDURE — 97113 AQUATIC THERAPY/EXERCISES: CPT

## 2023-09-14 NOTE — FLOWSHEET NOTE
unrestricted ADLs/work activities  Pt will improve R SLS to 10 seconds or greater to demonstrate improved symmetrical WB tolerance/proprioception through isolated stance phase activities for minimal risk of future re-injury  Pt will improve LEFS to 70% function or greater in order to demonstrate improved functional tolerances at PLOF with minimal restriction/dysfunction  Pt will demonstrate independence with a long term HEP for continued progress/maintenance after completion of PT    Pt. Education:  [] Yes  [] No  [x] Reviewed Prior HEP/Ed- postural awareness; working in controlled ranges  Method of Education: [x] Verbal  [x] Demo  [] Written  Comprehension of Education:  [x] Verbalizes understanding. [x] Demonstrates understanding. [] Needs review. [] Demonstrates/verbalizes HEP/Ed previously given. Plan: [x] Continue per plan of care.    [] Other:      Treatment Charges: Mins Units Units Billed/ Approved   []  Modalities      []  Ther Exercise   0/36   []  Manual Therapy      []  Ther Activities      [x]  Aquatics 17 1 9/36   []  Neuromuscular      [] Vasocompression      [] Gait Training      [] Dry needling        [] 1 or 2 muscles        [] 3 or more muscles      []  Other      Total Treatment time 17 1      Time In:  818 PM          Time Out: 458 PM    Electronically signed by:  Caitlyn Pace PTA

## 2023-09-16 LAB — CYTOLOGY REPORT: NORMAL

## 2023-09-19 ENCOUNTER — HOSPITAL ENCOUNTER (OUTPATIENT)
Dept: PHYSICAL THERAPY | Age: 43
Setting detail: THERAPIES SERIES
Discharge: HOME | End: 2023-09-19
Payer: COMMERCIAL

## 2023-09-19 NOTE — FLOWSHEET NOTE
[x] [facility]) [facility] & Therapy  [address]  [place]: [phone]   F: [phone]     Physical Therapy Cancel/No Show note    Date: 2023  Patient: Naya Love  : 1980  MRN: [id number]    Visit Count:   Cancels/No Shows to date:     For today's appointment patient:    [x]  Cancelled    [] Rescheduled appointment    [] No-show     Reason given by patient:    []  Patient ill    []  Conflicting appointment    [x] No transportation      [] Conflict with work    [] No reason given    [] Weather related    [] [id number]    [x] Other:      Comments:  Cab never picked her up      [x] Next appointment was confirmed    Electronically signed by: Tequila Callejas PTA

## 2023-09-21 ENCOUNTER — HOSPITAL ENCOUNTER (OUTPATIENT)
Dept: PHYSICAL THERAPY | Age: 43
Setting detail: THERAPIES SERIES
Discharge: HOME | End: 2023-09-21
Payer: COMMERCIAL

## 2023-09-21 PROCEDURE — 97113 AQUATIC THERAPY/EXERCISES: CPT

## 2023-09-21 NOTE — FLOWSHEET NOTE
[x] [facility] & Therapy  [address]  [place]: [phone]   F: [phone]    Physical Therapy Daily Treatment Note    Date:  2023  Patient Name:  Faraz Merino    :  1980  MRN: [id number]  Physician: Alexandre Tolliver PA-C          Insurance: [employer]. ** Approval valid 8/29 - 10/27: 36 units [id number] & [id number] **  Medical Diagnosis: Z98.890 (ICD-10-CM) - S/P right knee arthroscopy      Rehab Codes: M25.561, M25.661, R53.1, R26.9  Onset Date: DOS 23                  Next 's appt: PRN  Visit# / total visits:   Cancels/No Shows: 1/0    Subjective:  Calves are much better today- denies increased pain after last session. States she has been walking more and more; finally adjusting to increasing activity. Pain:  [x] Yes  [] No Location: R knee, medial knee @ joint line and across joint line below patella. Pain Rating: (0-10 scale) 3/10 R knee stiff, 3/10 L knee aching  Pain altered Tx:  [x] No  [] Yes  Action:    Comments: Patient late for appt- able to accommodate with shorter treatment. Objective:  [address] Exercise Log  Aquatic Knee phase 1  ** Approval valid 8/29 - 10/27: 36 units [id number] & [id number] **    Date of Eval:   23                             Primary PT: Tonio Jones, PT      Date 23    Visit #     Walk F/L/R 2 Laps 1 Lap 2 Laps    Marching 12x 1 Lap 2 Laps    Squats Low 10x5\" Low Box  10x5\" Tall 10x5\"    Heel toe raises 12x 10x 12x    SLR F/L/R 12x 10x 12x    HS Curl 12x 10x 12x    Step-Ups F/L Low 12x Low Fwd 10x Low F/L 10x    Step Down F/L       Lunges F/L    Add   Knee/Flex /Ext 12x 10x 12x           Kickboard Ex.  Med No Time Med    Iso Abd.  10x5\" - 10x5\"    Push-pull 10x - 10x    Paddling              Balance       Tandem       SLS              DEEP H2O 1 Noodle No Time 1 Noodle    Cycling 1' - 2'    Jacks   1'    IWT-Country 1' - 1'    Hang 3' - 3'

## 2023-09-22 ENCOUNTER — HOSPITAL ENCOUNTER (OUTPATIENT)
Dept: PHYSICAL THERAPY | Age: 43
Setting detail: THERAPIES SERIES
Discharge: HOME | End: 2023-09-22
Payer: COMMERCIAL

## 2023-09-22 NOTE — FLOWSHEET NOTE
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       [x] Next appointment was confirmed    Electronically signed by: Lenin Redd PTA

## 2023-09-26 ENCOUNTER — PROCEDURE VISIT (OUTPATIENT)
Dept: OBGYN CLINIC | Age: 43
End: 2023-09-26
Payer: COMMERCIAL

## 2023-09-26 DIAGNOSIS — T83.32XA INTRAUTERINE CONTRACEPTIVE DEVICE THREADS LOST, INITIAL ENCOUNTER: ICD-10-CM

## 2023-09-26 PROCEDURE — 76830 TRANSVAGINAL US NON-OB: CPT | Performed by: OBSTETRICS & GYNECOLOGY

## 2023-09-27 ENCOUNTER — HOSPITAL ENCOUNTER (OUTPATIENT)
Dept: PHYSICAL THERAPY | Age: 43
Setting detail: THERAPIES SERIES
Discharge: HOME | End: 2023-09-27
Payer: COMMERCIAL

## 2023-09-27 PROCEDURE — 97113 AQUATIC THERAPY/EXERCISES: CPT

## 2023-09-27 NOTE — FLOWSHEET NOTE
stability/strength necessary for unrestricted ADLs/work activities  Pt will improve R SLS to 10 seconds or greater to demonstrate improved symmetrical WB tolerance/proprioception through isolated stance phase activities for minimal risk of future re-injury  Pt will improve LEFS to 70% function or greater in order to demonstrate improved functional tolerances at PLOF with minimal restriction/dysfunction  Pt will demonstrate independence with a long term HEP for continued progress/maintenance after completion of PT    Pt. Education:  [] Yes  [] No  [x] Reviewed Prior HEP/Ed- Mobility of knees when working to ease stiffness with static standing  Method of Education: [x] Verbal  [x] Demo  [] Written  Comprehension of Education:  [x] Verbalizes understanding. [x] Demonstrates understanding. [] Needs review. [] Demonstrates/verbalizes HEP/Ed previously given. Plan: [x] Continue per plan of care.    [] Other:      Treatment Charges: Mins Units Units Billed/ Approved   []  Modalities      []  Ther Exercise   0/36   []  Manual Therapy      []  Ther Activities      [x]  Aquatics 50 3 15/36   []  Neuromuscular      [] Vasocompression      [] Gait Training      [] Dry needling        [] 1 or 2 muscles        [] 3 or more muscles      []  Other      Total Treatment time 50 3      Time In:  131 PM          Time Out: 224 PM    Electronically signed by:  Javier Chino PTA

## 2023-09-28 ENCOUNTER — HOSPITAL ENCOUNTER (OUTPATIENT)
Dept: PHYSICAL THERAPY | Age: 43
Setting detail: THERAPIES SERIES
Discharge: HOME | End: 2023-09-28
Payer: COMMERCIAL

## 2023-09-28 PROCEDURE — 97110 THERAPEUTIC EXERCISES: CPT

## 2023-09-28 PROCEDURE — 97113 AQUATIC THERAPY/EXERCISES: CPT

## 2023-09-28 NOTE — FLOWSHEET NOTE
[x] [facility] Therapy  [address]  [place]: [phone]   F: [phone]    Physical Therapy Daily Treatment Note    Date:  2023  Patient Name:  Edu Wilson    :  1980  MRN: [id number]  Physician: Tanika Watkins PA-C          Insurance: [address] [place] [address]. ** Approval valid 8/29 - 10/27: 36 units [id number] & [id number] **  Medical Diagnosis: Z98.890 (ICD-10-CM) - S/P right knee arthroscopy      Rehab Codes: M25.561, M25.661, R53.1, R26.9  Onset Date: DOS 23                  Next 's appt: PRN  Visit# / total visits:    Cancels/No Shows: 2/0    Subjective:  Sore today upon arrival due to just coming from work. States she has been trying to move/stretch knee more while at work when standing to keep it mobile which seems to help but notices more soreness in posterior knee. States today Left knee is more sore than right knee. Feels at time like her knee is going to come \"unhinged\" at the knee cap    Pain:  [x] Yes  [] No Location: R knee, medial knee @ joint line and across joint line below patella. Pain Rating: (0-10 scale) 5/10 R knee stiff, 5/10 L knee aching  Pain altered Tx:  [x] No  [] Yes  Action:    Comments: Patient late for appt- able to accommodate with shorter treatment.     Objective:  [address] [place] Exercise Log  Aquatic Knee phase 1  ** Approval valid 8/29 - 10/27: 36 units [id number] & [id number] **    Date of Eval:   23                             Primary PT: Sonido Luna, PT      Date 23   Visit #    Walk F/L/R 2 Laps 1 Lap 2 Laps 2 Laps 1 Lap   Marching 12x 1 Lap 2 Laps 2 Laps 1 Lap   Squats Low 10x5\" Low Box  10x5\" Tall 10x5\" Tall 12x5\" Tall 12x5\"   Heel toe raises 12x 10x 12x 12x 12x   SLR F/L/R 12x 10x 12x 12x 12x   HS Curl 12x 10x 12x 12x 12x   Step-Ups F/L Low 12x Low Fwd 10x Low F/L 10x Low F/L 12x    Step Down F/L        Lunges F/L    Low Fwd 10x

## 2023-09-28 NOTE — PROGRESS NOTES
Leg (Eyes Open) 10 seconds 7 seconds   Single Leg (Eyes Closed       Other:         Stairs: Reciprocal ascent x 1 flight with light unilateral handrail support. Step-to descent leading with R LE, tendency to angle body diagonally, and heavy handrail reliance. Assessment:  Pt has been seen for 8 PT visits to date, now just over 10 weeks s/p R arthroscopic partial medial menisectomy. Overall pt demonstrates good initial progress since starting therapy, reporting better mobility and improving pain levels. Slightly more soreness this week with return to full duty work, but otherwise demonstrates good measurable improvement in R knee flexion ROM as well as R LE strength. Pt is now able to ascend stairs reciprocally but still using a step-to pattern with heavy handrail support on descent secondary to knee pain and reduced eccentric quad control. Skilled PT intervention continues to be warranted to modulate pain, restore symmetrical R knee ROM, and maximize R LE strength/stability through all functional standing activities at her prior unrestricted level of function. Pt has 4 visits remaining on current insurance authorization, but will require additional visits beyond current auth to continue working towards all limitations and goals. Recommending 6 additional visits (18 total visits on POC) to optimize function.      STG: (to be met in 6 treatments)- ALL GOALS EXTENDED 9/28 TO END OF NEW POC - 18 VISITS  Pt will self report worst pain no greater than 2/10 in order to better tolerate ADLs/work activities with minimal dysfunction GOAL PROGRESSING AND EXTENDED 9/28  Pt will improve AROM in R knee to symmetrical values (0-120 deg flexion) in order to demonstrate ability to move/reach in all planes unrestricted at [address] 9/28  Pt will demonstrate ability to negotiate 1 flight of stairs reciprocally with minimal handrail use to show improved functional mobility and efficiency at her PLOF GOAL

## 2023-10-03 ENCOUNTER — HOSPITAL ENCOUNTER (OUTPATIENT)
Dept: PHYSICAL THERAPY | Age: 43
Setting detail: THERAPIES SERIES
Discharge: HOME | End: 2023-10-03
Payer: COMMERCIAL

## 2023-10-03 PROCEDURE — 97113 AQUATIC THERAPY/EXERCISES: CPT

## 2023-10-03 NOTE — FLOWSHEET NOTE
[] Gait Training      [] Dry needling        [] 1 or 2 muscles        [] 3 or more muscles      []  Other      Total Treatment time 40 3      Aquatics Time In:  245 PM          Time Out: 335 PM    Electronically signed by:  Haroldo Young PTA

## 2023-10-04 ENCOUNTER — TELEPHONE (OUTPATIENT)
Dept: ORTHOPEDIC SURGERY | Age: 43
End: 2023-10-04

## 2023-10-04 DIAGNOSIS — M25.561 CHRONIC PAIN OF RIGHT KNEE: ICD-10-CM

## 2023-10-04 DIAGNOSIS — G89.29 CHRONIC PAIN OF RIGHT KNEE: ICD-10-CM

## 2023-10-04 DIAGNOSIS — Z98.890 S/P RIGHT KNEE ARTHROSCOPY: Primary | ICD-10-CM

## 2023-10-04 NOTE — TELEPHONE ENCOUNTER
Pt states she returned to work 9/25/23 after knee surgery. She states she is in a lot of pain, more than prior to surgery. She states she went to PT yesterday and could not even do three exercises d/t pain. She would like a note for her employer stating she will remain off work until her appt with Hanh Fitch on 10/9/23. Please put note in her MyChart and she will show it to employer.

## 2023-10-05 ENCOUNTER — HOSPITAL ENCOUNTER (OUTPATIENT)
Dept: PHYSICAL THERAPY | Age: 43
Setting detail: THERAPIES SERIES
Discharge: HOME | End: 2023-10-05
Payer: COMMERCIAL

## 2023-10-05 PROCEDURE — 97113 AQUATIC THERAPY/EXERCISES: CPT

## 2023-10-05 NOTE — TELEPHONE ENCOUNTER
Patient is 2.5 months out from surgery, would not advise any further time off work. Would advise [facility] if patient feels like she is unable to return to work.

## 2023-10-05 NOTE — FLOWSHEET NOTE
Gait Training      [] Dry needling        [] 1 or 2 muscles        [] 3 or more muscles      []  Other      Total Treatment time 42 3      Aquatics Time In:  245 PM          Time Out: 537 PM    Electronically signed by:  Haroldo Young PTA

## 2023-10-09 ENCOUNTER — OFFICE VISIT (OUTPATIENT)
Dept: ORTHOPEDIC SURGERY | Age: 43
End: 2023-10-09
Payer: COMMERCIAL

## 2023-10-09 VITALS — HEIGHT: 62 IN | BODY MASS INDEX: 47.11 KG/M2 | WEIGHT: 256 LBS | RESPIRATION RATE: 14 BRPM

## 2023-10-09 DIAGNOSIS — Z98.890 S/P RIGHT KNEE ARTHROSCOPY: ICD-10-CM

## 2023-10-09 DIAGNOSIS — M17.0 BILATERAL PRIMARY OSTEOARTHRITIS OF KNEE: Primary | ICD-10-CM

## 2023-10-09 PROCEDURE — G8427 DOCREV CUR MEDS BY ELIG CLIN: HCPCS | Performed by: PHYSICIAN ASSISTANT

## 2023-10-09 PROCEDURE — G8484 FLU IMMUNIZE NO ADMIN: HCPCS | Performed by: PHYSICIAN ASSISTANT

## 2023-10-09 PROCEDURE — 1036F TOBACCO NON-USER: CPT | Performed by: PHYSICIAN ASSISTANT

## 2023-10-09 PROCEDURE — 99213 OFFICE O/P EST LOW 20 MIN: CPT | Performed by: PHYSICIAN ASSISTANT

## 2023-10-09 PROCEDURE — G8417 CALC BMI ABV UP PARAM F/U: HCPCS | Performed by: PHYSICIAN ASSISTANT

## 2023-10-09 RX ORDER — CELECOXIB 200 MG/1
200 CAPSULE ORAL DAILY
Qty: 60 CAPSULE | Refills: 3 | Status: SHIPPED | OUTPATIENT
Start: 2023-10-09

## 2023-10-10 ENCOUNTER — HOSPITAL ENCOUNTER (OUTPATIENT)
Dept: PHYSICAL THERAPY | Age: 43
Setting detail: THERAPIES SERIES
Discharge: HOME | End: 2023-10-10
Payer: COMMERCIAL

## 2023-10-10 PROCEDURE — 97113 AQUATIC THERAPY/EXERCISES: CPT

## 2023-10-10 NOTE — FLOWSHEET NOTE
[x] [facility] & Therapy  [address]  [place]: [phone]   F: [phone]    Physical Therapy Daily Treatment Note    Date:  10/10/2023  Patient Name:  Ton Harmon    :  1980  MRN: [id number]  Physician: Cesar Jimenez PA-C          Insurance: [employer]. ** FIRST AUTH: Approval valid 8/29 - 10/27: 36 units [id number] & [id number] **  **SECOND Auth: Approved 10/16/23-23 18 units [id number]; [id number] **  Medical Diagnosis: Z98.890 (ICD-10-CM) - S/P right knee arthroscopy      Rehab Codes: M25.561, M25.661, R53.1, R26.9  Onset Date: DOS 23                  Next 's appt: 10/9/23  Visit# / total visits:    Cancels/No Shows: 2/0    Subjective:  Saw ortho yesterday and per patient Dr feels she strained her knee- he put her on restrictions at work for 2 weeks. Patient to avoid pushing/pulling/lifting/carrying of >20# and to avoid excessive walking. States work is honoring her restrictions and she is off until Thursday to rest knee.  started her on Celebrex to assist with pain- she plans to  from pharmacy today. Can feel swelling present in both knee- states she has trouble icing because it aggravates her knee caps- finds heat feels better due to her arthritis. Pain:  [x] Yes  [] No Location: R knee, medial knee @ joint line and across joint line below patella.    Pain Rating: (0-10 scale) 5.5/10 R medial knee;  4.5/10 L knee pressure/pain along patella  Pain altered Tx:  [] No  [x] Yes  Action: Modified program to tolerance    Comments:     Objective:  [address] Exercise Log  Aquatic Knee phase 1  ** FIRST AUTH: Approval valid 8/29 - 10/27: 36 units [id number] & [id number] **  **SECOND Auth: Approved 10/16/23-23 18 units [id number]; [id number] **    Date of Eval:   23                             Primary PT: Lynda Peterson, PT      Date 10/3/23 10/5/23 10/10/23     Visit # 9/18 10/18 11/18     Walk F/L/R 2 Laps 2 Laps 2 Laps

## 2023-10-11 NOTE — PROGRESS NOTES
[address] [facility] [address] [facility], [address] [place] [address], [address] [place] [address] [address] [place]           Dept Phone: [phone]           Dept Fax:  [address]          Dept Phone: [phone]           Dept Fax:  [phone]      Chief Compliant:  Chief Complaint   Patient presents with    Knee Pain     Ariel knee        History of Present Illness: This is a 37 y.o. female who presents to the clinic today for evaluation of had concerns including Knee Pain (Ariel knee). Ms. Otto Mccabe is a 80-year-old female who returns today for Patient with chronic right knee pain following right knee arthroscopy partial medial meniscectomy on 7/18/2023. Patient continues to have pain and swelling at last visit like to pursue a physical therapy referral.  She was doing quite well with PT but unfortunately since she returned to work the last couple weeks she has had increasing knee pain she is also developed some left knee pain as well over the last couple weeks without new injury or trauma. Localized pain most severely to the medial aspect of both knees. Patient denies any joint warmth, redness, fever or chills. Patient had aspiration and injection prior to surgery but none since the surgery itself.        Past History:    Current Outpatient Medications:     celecoxib (CELEBREX) 200 MG capsule, Take 1 capsule by mouth daily, Disp: 60 capsule, Rfl: 3    ibuprofen (ADVIL;MOTRIN) 800 MG tablet, Take 1 tablet by mouth 4 times daily as needed for Pain, Disp: 120 tablet, Rfl: 5    acetaminophen (TYLENOL) 325 MG tablet, Take 2 tablets by mouth every 6 hours as needed for Pain, Disp: , Rfl:     meloxicam (MOBIC) 15 MG tablet, Take 1 tablet by mouth daily, Disp: 90 tablet, Rfl: 3    fluticasone (FLONASE) 50 MCG/ACT nasal spray, 1 spray by Each Nostril route daily, Disp:

## 2023-10-12 ENCOUNTER — HOSPITAL ENCOUNTER (OUTPATIENT)
Dept: PHYSICAL THERAPY | Age: 43
Setting detail: THERAPIES SERIES
Discharge: HOME | End: 2023-10-12
Payer: COMMERCIAL

## 2023-10-12 NOTE — FLOWSHEET NOTE
[] [facility]) - [facility] & Therapy  [address]  [place]: [phone]   F: [phone]     Physical Therapy Cancel/No Show note    Date: 10/12/2023  Patient: Wiley Kearns  : 1980  MRN: [id number]    Visit Count:   Cancels/No Shows to date: 3/0    For today's appointment patient:    [x]  Cancelled    [] Rescheduled appointment    [] No-show     Reason given by patient:    []  Patient ill    []  Conflicting appointment    [x] No transportation - Cab was 35 minutes late picking her up- unable to make it     [] Conflict with work    [] No reason given    [] Weather related    [] COVID-19    [] Other:      Comments:        [x] Next appointment was confirmed    Electronically signed by: Coty Stanford PTA

## 2023-10-17 ENCOUNTER — HOSPITAL ENCOUNTER (OUTPATIENT)
Dept: PHYSICAL THERAPY | Age: 43
Setting detail: THERAPIES SERIES
Discharge: HOME | End: 2023-10-17
Payer: COMMERCIAL

## 2023-10-17 ENCOUNTER — CLINICAL DOCUMENTATION (OUTPATIENT)
Dept: PHYSICAL THERAPY | Age: 43
End: 2023-10-17

## 2023-10-17 NOTE — CARE COORDINATION
[x] [facility]) [facility] & Therapy  [address]  [place]: [phone]   F: [phone]    THERAPY RESPONSIBILITY OF CARE TRANSFER FORM       PATIENT NAME: Parrish Dyer  MRN: [id number]   : 1980      TRANSFERRING FACILITY:    [] [facility]   [x] [facility]   []  [facility]   [] [facility]   [] [facility]   [] [facility]   [] [facility]    [] Other:       ACCEPTING FACILITY   [] [facility] Lucas   [x] [facility]   []  [facility]   [] [facility]   [] [facility]   [] [facility]   [] [facility]    [] Other:          REASON FOR TRANSFER: Evaluating PT going on maternity leave. Transfer of care to take place once evaluating PT starts her leave of absence. TRANSFER OF CARE:    I am transferring the care of the above patient to: John Mackay, PT  [facility] Jay PT  10/17/2023      ACCEPTANCE OF CARE:     I am accepting the care of the above patient.  John Mackay, PT

## 2023-10-17 NOTE — FLOWSHEET NOTE
[] [facility]) - [facility] & Therapy  [address]  [place]: [phone]   F: [phone]     Physical Therapy Cancel/No Show note    Date: 10/17/2023  Patient: Aly Whitlock  : 1980  MRN: [id number]    Visit Count:   Cancels/No Shows to date:     For today's appointment patient:    [x]  Cancelled    [] Rescheduled appointment    [] No-show     Reason given by patient:    []  Patient ill    []  Conflicting appointment    [] No transportation -      [x] Conflict with work- unable to get out of work on time    [] No reason given    [] Weather related    [] [phone]    [] Other:      Comments:        [x] Next appointment was confirmed    Electronically signed by: Navya Chappell PTA

## 2023-10-19 ENCOUNTER — HOSPITAL ENCOUNTER (OUTPATIENT)
Dept: PHYSICAL THERAPY | Age: 43
Setting detail: THERAPIES SERIES
Discharge: HOME | End: 2023-10-19
Payer: COMMERCIAL

## 2023-10-19 PROCEDURE — 97113 AQUATIC THERAPY/EXERCISES: CPT

## 2023-10-19 NOTE — FLOWSHEET NOTE
[x] [facility] & Therapy  [address]  [place]: [phone]   F: [phone]    Physical Therapy Daily Treatment Note    Date:  10/19/2023  Patient Name:  Justa Guerrero    :  1980  MRN: [id number]  Physician: Tiffany Zheng PA-C          Insurance: [employer]. ** FIRST AUTH: Approval valid 8/29 - 10/27: 36 units [id number] & [id number] **  **SECOND Auth: Approved 10/16/23-23 18 units [id number]; [id number] **  Medical Diagnosis: Z98.890 (ICD-10-CM) - S/P right knee arthroscopy      Rehab Codes: M25.561, M25.661, R53.1, R26.9  Onset Date: DOS 23                  Next 's appt: 10/9/23  Visit# / total visits:    Cancels/No Shows: 4/0    Subjective:  Patient reports she is still on restrictions until 10/27- worked 2 shifts Tues but was able to sit one of the shifts. Patient notes she is still having the tearing and pulling sensation in her R knee and may have to ask for her restrictions to be extended- notes increased pain especially with walking. Patient reports she is very stiff and achy today    Pain:  [x] Yes  [] No Location: R knee, medial knee @ joint line and across joint line below patella.    Pain Rating: (0-10 scale) 5/10 R medial knee; 4.5/10 L knee pressure/pain along patella  Pain altered Tx:  [x] No  [] Yes  Action:     Comments:     Objective:  [address] Exercise Log  Aquatic Knee phase 1  ** FIRST AUTH: Approval valid 8/29 - 10/27: 36 units [id number] & [id number] **  **SECOND Auth: Approved 10/16/23-23 18 units [id number]; [id number] **    Date of Eval:   23                             Primary PT: Ruddy Gutierrez, PT to [facility], PT after 10/20      Date 10/3/23 10/5/23 10/10/23 10/19/23    Visit # 9/18 10/18 11/18 12/18    Walk F/L/R 2 Laps 2 Laps 2 Laps 2 Laps    Marching 2 Laps 2 Laps 2 Laps 2 Laps       Low Box Low Box    Squats Low 12x5\" Low 12x5\" 10x5\" 10x5\"    Heel toe raises 12x 12x 10x 10x    SLR F/L/R 12x 12x

## 2023-10-20 ENCOUNTER — HOSPITAL ENCOUNTER (OUTPATIENT)
Dept: PHYSICAL THERAPY | Age: 43
Setting detail: THERAPIES SERIES
Discharge: HOME | End: 2023-10-20
Payer: COMMERCIAL

## 2023-10-20 PROCEDURE — 97113 AQUATIC THERAPY/EXERCISES: CPT

## 2023-10-20 NOTE — FLOWSHEET NOTE
[x] [facility]  [address]  [place]: [phone]   F: [phone]    Physical Therapy Daily Treatment Note    Date:  10/20/2023  Patient Name:  Justa Guerrero    :  1980  MRN: [id number]  Physician: Tiffany Zheng PA-C          Insurance: [employer]. ** FIRST AUTH: Approval valid 8/29 - 10/27: 36 units [id number] & [id number] **  **SECOND Auth: Approved 10/16/23-23 18 units [id number]; [id number] **  Medical Diagnosis: Z98.890 (ICD-10-CM) - S/P right knee arthroscopy      Rehab Codes: M25.561, M25.661, R53.1, R26.9  Onset Date: DOS 23                  Next 's appt: 10/9/23  Visit# / total visits:    Cancels/No Shows: 4/0    Subjective:  Patient reports feeling better compared to yesterday's visit. States pain more in right knee this morning. Notes feeling much better after yesterday's therapy session. Pain:  [x] Yes  [] No Location: R knee, medial knee @ joint line and across joint line below patella.    Pain Rating: (0-10 scale) 3/10 R medial knee; 3/10 L knee pressure/pain along patella  Pain altered Tx:  [x] No  [] Yes  Action:     Comments:     Objective:  [address] [place] Exercise Log  Aquatic Knee phase 1  ** FIRST AUTH: Approval valid 8/29 - 10/27: 36 units [id number] & [id number] **  **SECOND Auth: Approved 10/16/23-23 18 units [id number]; [id number] **    Date of Eval:   23                             Primary PT: Ruddy Gutierrez, PT to [facility], PT after 10/20      Date 10/3/23 10/5/23 10/10/23 10/19/23 10/20/23   Visit # 9/18 10/18 11/18 12/18 13/18   Walk F/L/R 2 Laps 2 Laps 2 Laps 2 Laps 2 Laps   Marching 2 Laps 2 Laps 2 Laps 2 Laps 2 Laps      Low Box Low Box  Low box   Squats Low 12x5\" Low 12x5\" 10x5\" 10x5\" 10x5\"   Heel toe raises 12x 12x 10x 10x 10x   SLR F/L/R 12x 12x 10x 10x 10x   HS Curl 12x 12x 10x 10x 10x   Step-Ups F/L Low F/L 12x Low 12x Low 10x Low 10x Low 10x   Step Up Retro     Low 10x   Step Down

## 2023-10-24 ENCOUNTER — HOSPITAL ENCOUNTER (OUTPATIENT)
Dept: PHYSICAL THERAPY | Age: 43
Setting detail: THERAPIES SERIES
Discharge: HOME | End: 2023-10-24
Payer: COMMERCIAL

## 2023-10-24 PROCEDURE — 97110 THERAPEUTIC EXERCISES: CPT

## 2023-10-24 PROCEDURE — 97113 AQUATIC THERAPY/EXERCISES: CPT

## 2023-10-24 NOTE — PROGRESS NOTES
[x] [facility]  [address]  [place]: [phone]   F: [phone]    Physical Therapy Progress Note    Date: 10/24/2023      Patient: Lenin Mosquera  : 1980  MRN: [id number]    Physician: Erasto Dc PA-C Insurance: [employer]. ** Approval valid  - 10/27: 36 units [id number] & [id number] **   **SECOND Auth: Approved 10/16/23-23 18 units [id number]; [id number] **  Diagnosis: Z98.890 (ICD-10-CM) - S/P right knee arthroscopy  Rehab Codes: M25.561, M25.661, R53.1, R26.9      Onset Date: DOS 23    Next Dr. Micah Manuel: PRN  Total visits attended:   Cancels/No shows: 40  Date of initial visit: 23                  Date of last progress note: 2023    Formal progress note reporting period from  - 10/24. Subjective: Pt states that she feels like aquatic therapy is still helping with her function. She reports that her pain is improving since last progress note, but functionally is still noting increased pain and difficulty. Since starting back at work about 2 weeks ago she also has noted more R knee pain as well and difficulty with performing work duties (standing/lifting/carrying/bending/walking). Pain:  [x] Yes  [] No  Location: DICKSON knees Pain Rating: (0-10 scale) 5/10  Pain altered Tx:  [] No  [] Yes  Action:  Comments:  Pain at worst since last progress note: 7/10    Objective:  Functional Status Previous level of function Current level of function Comments (2023) Comments (10/24/23)   Sitting [x] Independent  [] Deficit [] Independent  [] Deficit Stiff after an extended period of sitting Has not improved   Standing/walking [x] Independent  [] Deficit [] Independent  [x] Deficit Limited to 10-15 min.  Intermittently feels unstable Has been better but then got worse since returning to work, does not feel stable with walking   Driving [] Independent  [x] Deficit [] Independent  [x] Deficit Relies on transportation    Housekeeping/Meal

## 2023-10-24 NOTE — FLOWSHEET NOTE
Kickboard Ex. Med Med Med    Iso Abd.  10x5\" 10x5 10x5\"    Push-pull 15x 15x 15x    Paddling 15x 15x 15x           Balance       Tandem       SLS  2x20\" NT due to pain           DEEP H2O 1 Noodle 1 Noodle 1 Noodle    Cycling 2' 2' 2'    Jacks 2' 2' 2'    X-Country 2' 2' 2'    Hang              Stretches       Achllies       Hamstring 2x20\" 2x20\" 2x20\"    Psoas       Quad/ knee flex @ step 2x20\" 2x20\" 2x20\"           Breast Stroke 2 Laps 2 Laps 2 Laps    Pain Rating 5 3 6-6.5         Specific Instructions for next treatment:  add Tandem stance     Assessment: [] Progressing toward goals. [x] No change. See PT progress note. Minimal changes to program this date due to increased pain upon arrival. Patient noting some difficulty with knee flexion activity but able to complete without increasing pain. Patient notes most relief this visit with deep water aerobic activity. Held SLS balance this date due to difficulty with SL balance during LE exercise. Patient noting increased stiffness and limited range with leg curls and squats also. Finished with deep water unloading      [] Other:     [x] Patient would continue to benefit from skilled physical therapy services in order to: help modulate sx, restore pain free mobility in her R knee, and maximize functional strength/stability in her R knee necessary to restore tolerances with all standing/walking activities and CKC loading necessary to return to unrestricted ADLs and full duty work at her prior level of function.     STG: (to be met in 6 treatments)- ALL GOALS EXTENDED 9/28 TO END OF NEW POC - 18 VISITS  Pt will self report worst pain no greater than 2/10 in order to better tolerate ADLs/work activities with minimal dysfunction GOAL PROGRESSING AND EXTENDED 9/28  Pt will improve AROM in R knee to symmetrical values (0-120 deg flexion) in order to demonstrate ability to move/reach in all planes unrestricted at [address] 9/28  Pt will

## 2023-10-26 ENCOUNTER — APPOINTMENT (OUTPATIENT)
Dept: PHYSICAL THERAPY | Age: 43
End: 2023-10-26
Payer: COMMERCIAL

## 2023-10-26 ENCOUNTER — HOSPITAL ENCOUNTER (OUTPATIENT)
Dept: PHYSICAL THERAPY | Age: 43
Setting detail: THERAPIES SERIES
Discharge: HOME | End: 2023-10-26
Payer: COMMERCIAL

## 2023-10-26 NOTE — FLOWSHEET NOTE
[] [facility]  [address]  [place]: [phone]   F: [phone]     Physical Therapy Cancel/No Show note    Date: 10/26/2023  Patient: Kassy Robertson  : 1980  MRN: [id number]    Visit Count:   Cancels/No Shows to date:     For today's appointment patient:    [x]  Cancelled    [] Rescheduled appointment    [] No-show     Reason given by patient:    [x]  Patient ill    []  Conflicting appointment    [] No transportation      [] Conflict with work    [] No reason given    [] Weather related    [] COVID-19    [] Other:      Comments:        [x] Next appointment was confirmed    Electronically signed by: Sue Denver, PT

## 2023-10-31 ENCOUNTER — HOSPITAL ENCOUNTER (OUTPATIENT)
Dept: PHYSICAL THERAPY | Age: 43
Setting detail: THERAPIES SERIES
Discharge: HOME | End: 2023-10-31
Payer: COMMERCIAL

## 2023-11-02 ENCOUNTER — HOSPITAL ENCOUNTER (OUTPATIENT)
Dept: PHYSICAL THERAPY | Age: 43
Setting detail: THERAPIES SERIES
Discharge: HOME | End: 2023-11-02
Payer: COMMERCIAL

## 2023-11-02 PROCEDURE — 97110 THERAPEUTIC EXERCISES: CPT

## 2023-11-02 NOTE — FLOWSHEET NOTE
[x] [facility]) [facility]  [address]  [place]: [phone]   F: [phone]     Physical Therapy Cancel/No Show note    Date: 2023  Patient: Naya Love  : 1980  MRN: [id number]    Visit Count:   Cancels/No Shows to date:     For today's appointment patient:    [x]  Cancelled    [] Rescheduled appointment    [] No-show     Reason given by patient:    [x]  Patient ill    []  Conflicting appointment    [] No transportation      [] Conflict with work    [] No reason given    [] Weather related    [] COVID-19    [] Other:      Comments:        [x] Next appointment was confirmed    Electronically signed by: Ryann Fountain, PT

## 2023-11-02 NOTE — FLOWSHEET NOTE
throughout session but remained in tolerable ranges. Limited with mobility and strength so exercises specific to these deficits. Consistent cueing required to keep quad engaged with SLR,and refrain from excessive use of UE's during eccentric taps. Educated patient on importance of ice/elevation to control swelling and help lessen pain symptoms. Iced with vaso compression at end of session with patient noting improved pain symptoms post.   [x] Progressing toward goals. [] No change. [] Other:    [] Patient would continue to benefit from skilled physical therapy services in order to: improve pain, ROM, strength, and functionally mobility    STG: (to be met in 6 treatments)- ALL GOALS EXTENDED 10/24 TO END OF NEW POC - 18 VISITS  Pt will self report worst pain no greater than 2/10 in order to better tolerate ADLs/work activities with minimal dysfunction GOAL PROGRESSING AND EXTENDED 10/24  Pt will improve AROM in R knee to symmetrical values (0-120 deg flexion) in order to demonstrate ability to move/reach in all planes unrestricted at [address] 10/24  Pt will demonstrate ability to negotiate 1 flight of stairs reciprocally with minimal handrail use to show improved functional mobility and efficiency at her PLOF GOAL PARTIALLY MET (RECIPROCAL ASCENT, STEP-TO DESCENT); [id number]  Pt will improve R tandem stance EO to 30 seconds to show improved proprioception/stability in a narrowed MAXIMO with less risk of injury to R knee.  GOAL MET 9/28  LTG: (to be met in 20 treatments)  Pt will demonstrate improved R LE strength to 4+/5 or greater in order to demonstrate improved stability/strength necessary for unrestricted ADLs/work activities GOAL PROGRESSING AND EXTENDED 10/24  Pt will improve R SLS to 10 seconds or greater to demonstrate improved symmetrical WB tolerance/proprioception through isolated stance phase activities for minimal risk of future re-injury GOAL PROGRESSING AND EXTENDED

## 2023-11-07 ENCOUNTER — HOSPITAL ENCOUNTER (OUTPATIENT)
Dept: PHYSICAL THERAPY | Age: 43
Setting detail: THERAPIES SERIES
Discharge: HOME | End: 2023-11-07
Payer: COMMERCIAL

## 2023-11-07 PROCEDURE — 97750 PHYSICAL PERFORMANCE TEST: CPT

## 2023-11-08 NOTE — PROGRESS NOTES
inconsistencies or SL behavior 2 present + 1 of the 3 statistical calculations are positive = Significant evidence of low effort and inconsistent behavior. 3a) Significant clinical inconsistencies present + 0 (none) of the 3 statistical calculations are positive = Significant evidence of low effort and inconsistent behavior. No significant clinical inconsistencies and no SL behavior 2 present + all 3 statistical calculations are positive = Moderate evidence of low effort and inconsistent behavior. No significant clinical inconsistencies and no SL behavior 2 present + 2 of the 3 statistical calculations are positive = Weak evidence of low effort and inconsistent behavior. No significant clinical inconsistencies and no SL behavior 2 present + 1 of the 3 statistical calculations are positive = Very Weak evidence of low effort and inconsistent behavior. 6a) No significant clinical inconsistencies but SL behavior 2 present + 0 (none) of the 3 statistical calculations are positive = Very Weak evidence of low effort and inconsistent behavior. No significant clinical inconsistencies and no SL behavior 2 present + 0 (none) of the 3 statistical calculations are positive = No evidence of low effort and inconsistent behavior. Patient gave full/maximum effort on all aspects of the test.      DATA DETAILS     Note: For each task, client participation is rated as:   Appropriate - Client and therapist agree on stopping task. Full, physical effort given. Overextending - Therapist stops task. Client willing to continue despite maximum being reached. Full, physical effort given. Self-limiting - Client stops task before objective signs indicate that a maximum physical effort has been reached.      Lift - Floor to Waist - Appropriate  Completed 32 lb safely  Pain Score = 4.00  Pain Location = Tibial Tuberosity of the (R) knee  Ending HR = 86  Signs of Effort  Hands Slip/Difficulty Holding Box  Decreased Box Control  Increased

## 2023-11-09 ENCOUNTER — HOSPITAL ENCOUNTER (OUTPATIENT)
Dept: PHYSICAL THERAPY | Age: 43
Setting detail: THERAPIES SERIES
Discharge: HOME | End: 2023-11-09
Payer: COMMERCIAL

## 2023-11-09 PROCEDURE — 97110 THERAPEUTIC EXERCISES: CPT

## 2023-11-09 NOTE — FLOWSHEET NOTE
9/28  LTG: (to be met in 20 treatments)  Pt will demonstrate improved R LE strength to 4+/5 or greater in order to demonstrate improved stability/strength necessary for unrestricted ADLs/work activities GOAL PROGRESSING AND EXTENDED 10/24  Pt will improve R SLS to 10 seconds or greater to demonstrate improved symmetrical WB tolerance/proprioception through isolated stance phase activities for minimal risk of future re-injury GOAL PROGRESSING AND EXTENDED 10/24  Pt will improve LEFS to 70% function or greater in order to demonstrate improved functional tolerances at PLOF with minimal restriction/dysfunction GOAL EXTENDED 10/24  Pt will demonstrate independence with a long term HEP for continued progress/maintenance after completion of PT GOAL EXTENDED 10/24       Pt. Education:  [x] Yes  [] No  [] Reviewed Prior HEP/Ed  Method of Education: [x] Verbal  [] Demo  [] Written  Comprehension of Education:  [x] Verbalizes understanding. [] Demonstrates understanding. [] Needs review. [] Demonstrates/verbalizes HEP/Ed previously given. Plan: [x] Continue per plan of care.    [] Other:      Treatment Charges: Mins Units   []  Modalities     [x]  Ther Exercise 39 3   []  Manual Therapy     []  Ther Activities     []  Aquatics     []  Neuromuscular     [] Vasocompression     [] Gait Training     [] Dry needling        [] 1 or 2 muscles        [] 3 or more muscles     [x]  Other - ice  5 0   Total Treatment time 44 3     Time In:318 pm             Time Out: 403 pm     Electronically signed by:  Ronaldo Redd PTA

## 2023-11-14 ENCOUNTER — HOSPITAL ENCOUNTER (OUTPATIENT)
Dept: PHYSICAL THERAPY | Age: 43
Setting detail: THERAPIES SERIES
Discharge: HOME | End: 2023-11-14
Payer: COMMERCIAL

## 2023-11-14 PROCEDURE — 97110 THERAPEUTIC EXERCISES: CPT

## 2023-11-14 NOTE — FLOWSHEET NOTE
[x] [facility]  [address]  [place]: [phone]   F: [phone]    Physical Therapy Daily Treatment Note      Date:  2023  Patient Name:  Chelsie Alcantar    :  1980  MRN: [id number]  Physician: Jocelyne Gowers, PA-C          Insurance: [employer]. ** Approval valid  - 10/27: 36 units [id number] & [id number] **   **SECOND Auth: Approved 10/16/23-23 18 units [id number]; [id number] **  Diagnosis: Z98.890 (ICD-10-CM) - S/P right knee arthroscopy  Rehab Codes: M25.561, M25.661, R53.1, R26.9                                 Onset Date: DOS 23                    Next Dr. Kena Mcclelland: PRN  Total visits attended:               Cancels/No shows:   Date of initial visit: 23                  Date of last progress note: 2023    Subjective:    Pt comes in stating she be calling her doctor about what happened at the FCE. She is still having pain that medial R knee along with some pain at th L knee but she notes that this is probably d/t compensation. She has pain and stiffness at the R knee. Does feel like her knee stiffness is getting worse. States that the R knee clicking and catching have been getting worse. As stated I previous note, pt did her FCE 2023 and pt reports that the therapist conducting the test told her to twist and pivot on her knee to do lifting and turning with boxes and that is when she felt a pop/sharp pain at the R knee and has had increased popping and clicking since then. Pain:  [x] Yes  [] No Location: R knee Pain Rating: (0-10 scale) 4-5/10  Pain altered Tx:  [] No  [x] Yes  Action:ice post exercise     Objective:  Modalities: ice pack to R knee in supine 5' post exercise   Precautions:  Exercises:  Exercise Reps/ Time Weight/ Level Completed  Today Comments   Supine        HS stretch  30\"x3  x    SLR 10x3\" . 75# x Cueing required for eccentric control   Added weight    Heel slides - hold at end range  10x3\" hold

## 2023-11-16 ENCOUNTER — HOSPITAL ENCOUNTER (OUTPATIENT)
Dept: PHYSICAL THERAPY | Age: 43
Setting detail: THERAPIES SERIES
Discharge: HOME | End: 2023-11-16
Payer: COMMERCIAL

## 2023-11-16 NOTE — FLOWSHEET NOTE
[] [facility]) - [facility]  [address]  [place]: [phone]   F: [phone]     Physical Therapy Cancel/No Show note    Date: 2023  Patient: Edu Wilson  : 1980  MRN: [id number]    Visit Count:   Cancels/No Shows to date:     For today's appointment patient:    [x]  Cancelled    [] Rescheduled appointment    [] No-show     Reason given by patient:    []  Patient ill    []  Conflicting appointment    [x] No transportation      [] Conflict with work    [] No reason given    [] Weather related    [] COVID-19    [] Other:      Comments:  pt reports that transportation never showed up to bring her to the appointment.        [x] Next appointment was confirmed    Electronically signed by: Kelly Castillo, PT

## 2023-11-21 ENCOUNTER — HOSPITAL ENCOUNTER (OUTPATIENT)
Dept: PHYSICAL THERAPY | Age: 43
Setting detail: THERAPIES SERIES
Discharge: HOME | End: 2023-11-21
Payer: COMMERCIAL

## 2023-11-21 PROCEDURE — 97110 THERAPEUTIC EXERCISES: CPT

## 2023-11-21 NOTE — FLOWSHEET NOTE
[x] [facility]  [address]  [place]: [phone]   F: [phone]    Physical Therapy Daily Treatment Note      Date:  2023  Patient Name:  Nereida Pichardo    :  1980  MRN: [id number]  Physician: Lupe De La Rosa PA-C          Insurance: [employer]. ** Approval valid  - 10/27: 36 units [id number] & [id number] **   **SECOND Auth: Approved 10/16/23-23 18 units [id number]; [id number] **  Diagnosis: Z98.890 (ICD-10-CM) - S/P right knee arthroscopy  Rehab Codes: M25.561, M25.661, R53.1, R26.9                                 Onset Date: DOS 23                    Next  [address] [place] [address] [place]: PRN  Total visits attended:               Cancels/No shows: 40  Date of initial visit: 23                  Date of last progress note: 2023    Subjective:    : Patient states she still has intermittent clicking/catching in R knee with activities but it has gotten better since last week. Patient states she has appointment with MD 23. Pain:  [x] Yes  [] No Location: R knee Pain Rating: (0-10 scale) 4-5/10  Pain altered Tx:  [] No  [x] Yes  Action:ice post exercise     Objective:  Modalities: ice pack to R knee in supine 5' post exercise   Precautions:  Exercises:  Exercise Reps/ Time Weight/ Level Completed  Today Comments   Supine        HS stretch  30\"x3  x    SLR 10x3\" . 75# x Cueing required for eccentric control   Added weight    Heel slides - hold at end range  10x3\" hold   x OP from treating therapist           Prone        Quad stretch  30\"x3   x    Hs curls  10x3\" hold  1#  x Added weight            Seated        Flexion hold  10x3\"    Self assist with other LE    LAQ 10x3\"   x Noted catching sensation beneath patella     Schwinn bike  5'  x Added  lowest seat setting,backwards pedaling caused relief of pain this date           Standing        Eccentric taps  10x  2in  x    Hamstring Curls [place]ICK[place] 10x2 . 75# x Added    SB calf stretch

## 2023-12-01 ENCOUNTER — OFFICE VISIT (OUTPATIENT)
Dept: ORTHOPEDIC SURGERY | Age: 43
End: 2023-12-01
Payer: COMMERCIAL

## 2023-12-01 VITALS — HEIGHT: 62 IN | BODY MASS INDEX: 47.11 KG/M2 | WEIGHT: 256 LBS

## 2023-12-01 DIAGNOSIS — M17.0 BILATERAL PRIMARY OSTEOARTHRITIS OF KNEE: Primary | ICD-10-CM

## 2023-12-01 PROCEDURE — 99213 OFFICE O/P EST LOW 20 MIN: CPT | Performed by: PHYSICIAN ASSISTANT

## 2023-12-01 PROCEDURE — 1036F TOBACCO NON-USER: CPT | Performed by: PHYSICIAN ASSISTANT

## 2023-12-01 PROCEDURE — G8484 FLU IMMUNIZE NO ADMIN: HCPCS | Performed by: PHYSICIAN ASSISTANT

## 2023-12-01 PROCEDURE — G8427 DOCREV CUR MEDS BY ELIG CLIN: HCPCS | Performed by: PHYSICIAN ASSISTANT

## 2023-12-01 PROCEDURE — G8417 CALC BMI ABV UP PARAM F/U: HCPCS | Performed by: PHYSICIAN ASSISTANT

## 2023-12-01 NOTE — PROGRESS NOTES
tablet, Take 2 tablets by mouth every 6 hours as needed for Pain, Disp: , Rfl:     meloxicam (MOBIC) 15 MG tablet, Take 1 tablet by mouth daily, Disp: 90 tablet, Rfl: 3    fluticasone (FLONASE) 50 MCG/ACT nasal spray, 1 spray by Each Nostril route daily, Disp: 32 g, Rfl: 1  Allergies   Allergen Reactions    Sulfa Antibiotics Hives     Social History     Socioeconomic History    Marital status: Single     Spouse name: Not on file    Number of children: Not on file    Years of education: Not on file    Highest education level: Not on file   Occupational History    Not on file   Tobacco Use    Smoking status: Never    Smokeless tobacco: Never   Vaping Use    Vaping Use: Never used   Substance and Sexual Activity    Alcohol use: Not Currently    Drug use: Never    Sexual activity: Not on file   Other Topics Concern    Not on file   Social History Narrative    Not on file     Social Determinants of Health     Financial Resource Strain: Low Risk  (3/15/2023)    Overall Financial Resource Strain (CARDIA)     Difficulty of Paying Living Expenses: Not hard at all   Food Insecurity: Not on file (3/15/2023)   Transportation Needs: Unknown (3/15/2023)    PRAPARE - Transportation     Lack of Transportation (Medical): Not on file     Lack of Transportation (Non-Medical):  No   Physical Activity: Not on file   Stress: Not on file   Social Connections: Not on file   Intimate Partner Violence: Not on file   Housing Stability: Unknown (3/15/2023)    Housing Stability Vital Sign     Unable to Pay for Housing in the Last Year: Not on file     Number of Places Lived in the Last Year: Not on file     Unstable Housing in the Last Year: No     Past Medical History:   Diagnosis Date    Asthma     as a child & exercise induced    Borderline diabetes     COVID-19 02/2023    Edema      Past Surgical History:   Procedure Laterality Date    KNEE ARTHROSCOPY Right 7/18/2023    KNEE ARTHROSCOPY  PARTIAL MEDIAL MENISCECTOMY   RIGHT performed by

## 2023-12-04 ENCOUNTER — TELEPHONE (OUTPATIENT)
Dept: ORTHOPEDIC SURGERY | Age: 43
End: 2023-12-04

## 2023-12-04 NOTE — TELEPHONE ENCOUNTER
Prior authorization has been started for Durolane . Faxed paper work to specialty pharmacy at pharmacy benefits [phone].

## 2023-12-05 ENCOUNTER — TELEPHONE (OUTPATIENT)
Dept: INTERNAL MEDICINE CLINIC | Age: 43
End: 2023-12-05

## 2023-12-05 DIAGNOSIS — R10.9 ABDOMINAL PAIN, UNSPECIFIED ABDOMINAL LOCATION: Primary | ICD-10-CM

## 2023-12-05 NOTE — TELEPHONE ENCOUNTER
Pt called stating that she been having stomach pains and having diarrhea since 11/20/23, no appt available pt wants a referral to gastro

## 2023-12-11 ENCOUNTER — OFFICE VISIT (OUTPATIENT)
Dept: GASTROENTEROLOGY | Age: 43
End: 2023-12-11
Payer: COMMERCIAL

## 2023-12-11 VITALS
DIASTOLIC BLOOD PRESSURE: 89 MMHG | HEART RATE: 73 BPM | WEIGHT: 252 LBS | BODY MASS INDEX: 46.38 KG/M2 | HEIGHT: 62 IN | SYSTOLIC BLOOD PRESSURE: 126 MMHG

## 2023-12-11 DIAGNOSIS — K58.0 IRRITABLE BOWEL SYNDROME WITH DIARRHEA: ICD-10-CM

## 2023-12-11 DIAGNOSIS — K21.9 GASTROESOPHAGEAL REFLUX DISEASE, UNSPECIFIED WHETHER ESOPHAGITIS PRESENT: ICD-10-CM

## 2023-12-11 DIAGNOSIS — Z80.0 FAMILY HISTORY OF COLON CANCER: ICD-10-CM

## 2023-12-11 DIAGNOSIS — Z83.719 FAMILY HISTORY OF COLON POLYPS, UNSPECIFIED: ICD-10-CM

## 2023-12-11 DIAGNOSIS — K58.0 IRRITABLE BOWEL SYNDROME WITH DIARRHEA: Primary | ICD-10-CM

## 2023-12-11 DIAGNOSIS — E66.8 MODERATE OBESITY: ICD-10-CM

## 2023-12-11 DIAGNOSIS — R11.0 NAUSEA: ICD-10-CM

## 2023-12-11 DIAGNOSIS — R19.7 DIARRHEA, UNSPECIFIED TYPE: Primary | ICD-10-CM

## 2023-12-11 PROBLEM — E66.9 MODERATE OBESITY: Status: ACTIVE | Noted: 2023-12-11

## 2023-12-11 PROCEDURE — 99204 OFFICE O/P NEW MOD 45 MIN: CPT | Performed by: INTERNAL MEDICINE

## 2023-12-11 PROCEDURE — G8417 CALC BMI ABV UP PARAM F/U: HCPCS | Performed by: INTERNAL MEDICINE

## 2023-12-11 PROCEDURE — G8484 FLU IMMUNIZE NO ADMIN: HCPCS | Performed by: INTERNAL MEDICINE

## 2023-12-11 PROCEDURE — 1036F TOBACCO NON-USER: CPT | Performed by: INTERNAL MEDICINE

## 2023-12-11 PROCEDURE — G8427 DOCREV CUR MEDS BY ELIG CLIN: HCPCS | Performed by: INTERNAL MEDICINE

## 2023-12-11 RX ORDER — ALBUTEROL SULFATE 90 UG/1
AEROSOL, METERED RESPIRATORY (INHALATION)
COMMUNITY
Start: 2023-11-16

## 2023-12-11 RX ORDER — ACETAMINOPHEN 325 MG/1
650 TABLET ORAL EVERY 6 HOURS PRN
COMMUNITY

## 2023-12-11 RX ORDER — POLYETHYLENE GLYCOL 3350 17 G/17G
POWDER, FOR SOLUTION ORAL
Qty: 238 G | Refills: 0 | Status: SHIPPED | OUTPATIENT
Start: 2023-12-11

## 2023-12-11 RX ORDER — ALBUTEROL SULFATE 2.5 MG/3ML
SOLUTION RESPIRATORY (INHALATION)
COMMUNITY
Start: 2023-11-27

## 2023-12-11 RX ORDER — BISACODYL 5 MG/1
TABLET, DELAYED RELEASE ORAL
Qty: 4 TABLET | Refills: 0 | Status: SHIPPED | OUTPATIENT
Start: 2023-12-11

## 2023-12-11 RX ORDER — ALBUTEROL SULFATE 90 UG/1
2 AEROSOL, METERED RESPIRATORY (INHALATION) EVERY 4 HOURS PRN
COMMUNITY
Start: 2023-11-15

## 2023-12-11 ASSESSMENT — ENCOUNTER SYMPTOMS
TROUBLE SWALLOWING: 0
VOICE CHANGE: 0
VOMITING: 0
DIARRHEA: 1
CONSTIPATION: 0
WHEEZING: 0
RECTAL PAIN: 0
CHOKING: 0
BACK PAIN: 1
BLOOD IN STOOL: 0
ANAL BLEEDING: 0
NAUSEA: 1
COUGH: 0
ALLERGIC/IMMUNOLOGIC NEGATIVE: 1
ABDOMINAL PAIN: 1
SINUS PRESSURE: 0
ABDOMINAL DISTENTION: 1
SORE THROAT: 0

## 2023-12-11 NOTE — PROGRESS NOTES
exercise at least three times a week . Dietary advices were also given about the avoidance of fast food, fried food and lots of spices and grease. nstructions were given about using ample amount of fiber including dietary and supplemental fiber either metamucil, bennafiber or citrucell etc.  Pt was advised about drinking ample amount of water without any colors or chemicals. Stress was given about regular exercise. Pt was told to stay way from soda pops. Pt has verbalized understanding and agrrement    The patient was instructed to start taking some OTC Probiotics products   These are available over the counter at the Pharmacy stores and Grocery stores  He was explained about the beneficial effects they have in the GI track  They will help to establish the good bacterial essence and will help with the digestion and bowel movements  The patient has verbalized understanding and agreement to this plan      Thank you for allowing me to participate in the care of Ms. Luis M Kang. For any further questions please do not hesitate to contact me. I have reviewed and agree with the ROS entered by the MA/Nurse.          Tess Clark MD, [facility]  Board Certified in Gastroenterology and [address] [facility]  Office #: [phone]

## 2023-12-12 ENCOUNTER — HOSPITAL ENCOUNTER (OUTPATIENT)
Dept: PHYSICAL THERAPY | Age: 43
Setting detail: THERAPIES SERIES
Discharge: HOME | End: 2023-12-12
Payer: COMMERCIAL

## 2023-12-12 DIAGNOSIS — R11.0 NAUSEA: ICD-10-CM

## 2023-12-12 DIAGNOSIS — Z80.0 FAMILY HISTORY OF COLON CANCER: ICD-10-CM

## 2023-12-12 DIAGNOSIS — E66.8 MODERATE OBESITY: ICD-10-CM

## 2023-12-12 DIAGNOSIS — K58.0 IRRITABLE BOWEL SYNDROME WITH DIARRHEA: Primary | ICD-10-CM

## 2023-12-12 DIAGNOSIS — R19.7 DIARRHEA, UNSPECIFIED TYPE: ICD-10-CM

## 2023-12-12 DIAGNOSIS — K21.9 GASTROESOPHAGEAL REFLUX DISEASE, UNSPECIFIED WHETHER ESOPHAGITIS PRESENT: ICD-10-CM

## 2023-12-12 DIAGNOSIS — Z83.719 FAMILY HISTORY OF COLON POLYPS, UNSPECIFIED: ICD-10-CM

## 2023-12-12 PROCEDURE — 97110 THERAPEUTIC EXERCISES: CPT

## 2023-12-12 NOTE — PROGRESS NOTES
[x] [facility]  [address]  [place]: [phone]   F: [phone]    Physical Therapy Progress Note    Date: 2023      Patient: Meliza Ho  : 1980  MRN: [id number]    Physician: Adamaris Cornelius PA-C Insurance: [employer]. ** Approval valid  - 10/27: 36 units [id number] & [id number] **   **SECOND Auth: Approved 10/16/23-23 18 units [id number]; [id number] **  Diagnosis: Z98.890 (ICD-10-CM) - S/P right knee arthroscopy  Rehab Codes: M25.561, M25.661, R53.1, R26.9      Onset Date: DOS 23    Next  [address] [place] [address] [place]: PRN  Total visits attended:   Cancels/No shows:   Date of initial visit: 23                  Date of last progress note: 2023    Formal progress note reporting period from 10/24 - . Subjective:   Pt comes in for progress note at this date. Pt comes in stating that she is still having pain at the medial aspect of the R knee along with now having pain at the L knee as well. She reports that she saw her referring provider to which she states that he reported the her that it was a \"hamstring tear\" and that he did not order any MRI and only did an Xray. However, she reports that sh is still experiencing the locking and clicking at th R knee after that FCE 2023. In regards to work she reports that her knee is feeling okay and that it is \"manageable for now\" and she is able to take more seated breaks for the next 6 months. Does report to writer that prior to the FCE she did feel like she was progressing but after the FCE she feels like she injured where she had her knee arthroscopy at. She reports that she has been doing her HEP but she is still feeling pretty stiff after not being able come d/t nate sick.     Pain:  [x] Yes  [] No  Location: DICKSON knees Pain Rating: (0-10 scale) 4.5/10  Pain altered Tx:  [] No  [] Yes  Action:  Comments:  Pain at worst since last progress note: 6/10    Objective:  Functional Status

## 2023-12-28 ENCOUNTER — HOSPITAL ENCOUNTER (OUTPATIENT)
Dept: PHYSICAL THERAPY | Age: 43
Setting detail: THERAPIES SERIES
Discharge: HOME | End: 2023-12-28
Payer: COMMERCIAL

## 2023-12-28 PROCEDURE — 97110 THERAPEUTIC EXERCISES: CPT

## 2023-12-28 NOTE — FLOWSHEET NOTE
proprioception/stability in a narrowed MAXIMO with less risk of injury to R knee. GOAL MET 9/28  LTG: (to be met in 20 treatments)  Pt will demonstrate improved R LE strength to 4+/5 or greater in order to demonstrate improved stability/strength necessary for unrestricted ADLs/work activities GOAL PROGRESSING AND EXTENDED 10/24  Pt will improve R SLS to 10 seconds or greater to demonstrate improved symmetrical WB tolerance/proprioception through isolated stance phase activities for minimal risk of future re-injury GOAL PROGRESSING AND EXTENDED 10/24  Pt will improve LEFS to 70% function or greater in order to demonstrate improved functional tolerances at PLOF with minimal restriction/dysfunction GOAL EXTENDED 10/24  Pt will demonstrate independence with a long term HEP for continued progress/maintenance after completion of PT GOAL EXTENDED 10/24       Pt. Education:  [x] Yes  [] No  [] Reviewed Prior HEP/Ed  Method of Education: [x] Verbal  [x] Demo  [x] Written  Comprehension of Education:  [x] Verbalizes understanding. [x] Demonstrates understanding. [] Needs review. [] Demonstrates/verbalizes HEP/Ed previously given. Access Code: [email or web address]  URL: [email or web address]  Date: 11/14/2023  Prepared by: Lauren Cluster    Exercises  - Seated Long Arc Quad  - 1 x daily - 7 x weekly - 3 sets - 10 reps - 3 hold  - Standing Knee Flexion with Counter Support  - 1 x daily - 7 x weekly - 3 sets - 10 reps  - Active Straight Leg Raise with Quad Set  - 1 x daily - 7 x weekly - 3 sets - 10 reps  - Hooklying Hamstring Stretch with Strap  - 1 x daily - 7 x weekly - 3 sets - 1 reps - 30 hold    Access Code: [email or web address]  URL: [email or web address]  Date: 11/21/2023  Prepared by: Sushma Fear    Exercises  - Supine Heel Slide  - 1 x daily - 7 x weekly - 2 sets - 10 reps - 3 seconds  hold  - Prone Quad Stretch with Towel Roll and Strap  - 1 x daily - 7 x weekly - 1 sets - 3 reps - 30 seconds hold  - Prone Knee Flexion

## 2024-01-02 ENCOUNTER — HOSPITAL ENCOUNTER (OUTPATIENT)
Dept: PHYSICAL THERAPY | Age: 44
Setting detail: THERAPIES SERIES
Discharge: HOME | End: 2024-01-02
Payer: COMMERCIAL

## 2024-01-02 PROCEDURE — 97110 THERAPEUTIC EXERCISES: CPT

## 2024-01-02 NOTE — FLOWSHEET NOTE
[x] [facility]   Outpatient Rehabilitation & Therapy  [address]  P: [phone]   F: [phone]    Physical Therapy Daily Treatment Note      Date:  2024  Patient Name:  Radhika Doty    :  1980  MRN: [id number]  Physician: Aniket Sahni PA-C          Insurance: [facility].   ** Approval valid  - 10/27: 36 units [id number] & [id number] **   **SECOND Auth: Approved 10/16/23-23 18 units [id number]; [id number] **  **THIRD AUTH: Approved  - 3/12/2024**  Diagnosis: Z98.890 (ICD-10-CM) - S/P right knee arthroscopy  Rehab Codes: M25.561, M25.661, R53.1, R26.9                                 Onset Date: DOS 23                    Next  Appt: PRN  Total visits attended:               Cancels/No shows:       Subjective:    : Pt comes in reporting that there is still fluid noted at DICKSON knees and plans to call today after this session to see about getting the fluid removed. Reports pain is lower d/t not working over the past few days but when she is at work it increases to 7/10. Also reports hat someone made a sign at work stating no chairs by the register when she is supposed to be allowed to sit d/t her knee.    Pain:  [x] Yes  [] No Location: R knee Pain Rating: (0-10 scale) 2/10  Pain altered Tx:  [x] No  [] Yes  Action:    Objective:  Modalities: ice pack to R knee in supine 5' post exercise   Precautions:  Exercises:  Exercise Reps/ Time Weight/ Level Completed  Today Comments   Supine        HS stretch  30\"x3      SLR 10x2 1# x Cueing required for eccentric control   Added weight   Increased weight    Heel slides - hold at end range with slide board 10x3\" hold    OP from treating therapist    Reverse Clamshells 10 x 2 eac  x Added           Prone        Quad stretch  30\"x3       Hs curls  10x3\" hold  1#   Added weight 11           Seated        Flexion hold  10x3\"  yellow x Self assist with other LE   Added resistance    LAQ 10x3\"  .75# x  no

## 2024-01-04 ENCOUNTER — HOSPITAL ENCOUNTER (OUTPATIENT)
Dept: PHYSICAL THERAPY | Age: 44
Setting detail: THERAPIES SERIES
Discharge: HOME | End: 2024-01-04
Payer: COMMERCIAL

## 2024-01-04 PROCEDURE — 97110 THERAPEUTIC EXERCISES: CPT

## 2024-01-04 NOTE — FLOWSHEET NOTE
[x] [facility]   Outpatient Rehabilitation & Therapy  [address]  P: [phone]   F: [phone]    Physical Therapy Daily Treatment Note      Date:  2024  Patient Name:  Radhika Doty    :  1980  MRN: [id number]  Physician: Aniket Sahni PA-C          Insurance: [facility].   ** Approval valid  - 10/27: 36 units [id number] & [id number] **   **SECOND Auth: Approved 10/16/23-23 18 units [id number]; [id number] **  **THIRD AUTH: Approved  - 3/12/2024**  Diagnosis: Z98.890 (ICD-10-CM) - S/P right knee arthroscopy  Rehab Codes: M25.561, M25.661, R53.1, R26.9                                 Onset Date: DOS 23                    Next  Appt: PRN  Total visits attended:               Cancels/No shows:       Subjective:     Patient arrived and reports pain is a little better but still swollen. Patient states she has appointment 1/10/23 with MD to address fluid.     Pain:  [x] Yes  [] No Location: R knee Pain Rating: (0-10 scale) 3/10  Pain altered Tx:  [x] No  [] Yes  Action:    Objective:  Modalities: ice pack to R knee in supine 5' post exercise   Precautions:  Exercises:  Exercise Reps/ Time Weight/ Level Completed  Today Comments   Supine        HS stretch  30\"x3  x    SLR 10x2 1# x Cueing required for eccentric control   Added weight   Increased weight    Heel slides - hold at end range with slide board 10x3\" hold    OP from treating therapist    Reverse Clamshells 10 x 2 eac  x Added           Prone        Quad stretch  30\"x3   x    Hs curls  10x3\" hold  1#  x Added weight            Seated        Flexion hold  10x3\"  yellow x Self assist with other LE   Added resistance    LAQ 10x3\"  .75# x  no catching or clicking noted this session  Added weight    Schwinn bike  5'   Added  lowest seat setting,backwards pedaling caused relief of pain this date    Nustep  5 mins 3   added    Standing        Eccentric taps  12x2 2in  x Increased

## 2024-01-09 ENCOUNTER — HOSPITAL ENCOUNTER (OUTPATIENT)
Dept: PHYSICAL THERAPY | Age: 44
Setting detail: THERAPIES SERIES
Discharge: HOME | End: 2024-01-09
Payer: COMMERCIAL

## 2024-01-09 PROCEDURE — 97110 THERAPEUTIC EXERCISES: CPT

## 2024-01-09 NOTE — FLOWSHEET NOTE
Raise with Quad Set  - 1 x daily - 7 x weekly - 3 sets - 10 reps  - Hooklying Hamstring Stretch with Strap  - 1 x daily - 7 x weekly - 3 sets - 1 reps - 30 hold    Access Code: [email or web address]  URL: [email or web address]  Date: 11/21/2023  Prepared by: Marli Rivero    Exercises  - Supine Heel Slide  - 1 x daily - 7 x weekly - 2 sets - 10 reps - 3 seconds  hold  - Prone Quad Stretch with Towel Roll and Strap  - 1 x daily - 7 x weekly - 1 sets - 3 reps - 30 seconds hold  - Prone Knee Flexion AAROM with Overpressure  - 1 x daily - 7 x weekly - 1 sets - 3 reps - 30 seconds hold  - Standing Gastroc Stretch  - 1 x daily - 7 x weekly - 1 sets - 3 reps - 30 seconds  hold    Plan: [x] Continue per plan of care.   [] Other:      Treatment Charges: Mins Units Units Billed/ Approved  3nd Auth-starts 12/21)   []  Modalities      [x]  Ther Exercise 41 3 16 / 39   []  Manual Therapy      []  Ther Activities      []  Aquatics      []  Neuromuscular    / 12   [] Vasocompression      [] Gait Training    / 12   [] Dry needling        [] 1 or 2 muscles        [] 3 or more muscles      []  Other - ice       Total Treatment time 41 3    Unbillable 5 mins for cold pack    Time In: 11:17 am           Time Out: 12:03 pm     Electronically signed by:  Concha Walker, PT

## 2024-01-10 ENCOUNTER — OFFICE VISIT (OUTPATIENT)
Dept: ORTHOPEDIC SURGERY | Age: 44
End: 2024-01-10
Payer: COMMERCIAL

## 2024-01-10 VITALS — WEIGHT: 251.99 LBS | RESPIRATION RATE: 14 BRPM | HEIGHT: 62 IN | BODY MASS INDEX: 46.37 KG/M2

## 2024-01-10 DIAGNOSIS — M17.0 BILATERAL PRIMARY OSTEOARTHRITIS OF KNEE: Primary | ICD-10-CM

## 2024-01-10 PROCEDURE — G8417 CALC BMI ABV UP PARAM F/U: HCPCS | Performed by: PHYSICIAN ASSISTANT

## 2024-01-10 PROCEDURE — 99213 OFFICE O/P EST LOW 20 MIN: CPT | Performed by: PHYSICIAN ASSISTANT

## 2024-01-10 PROCEDURE — 1036F TOBACCO NON-USER: CPT | Performed by: PHYSICIAN ASSISTANT

## 2024-01-10 PROCEDURE — G8484 FLU IMMUNIZE NO ADMIN: HCPCS | Performed by: PHYSICIAN ASSISTANT

## 2024-01-10 PROCEDURE — G8427 DOCREV CUR MEDS BY ELIG CLIN: HCPCS | Performed by: PHYSICIAN ASSISTANT

## 2024-01-10 PROCEDURE — 20610 DRAIN/INJ JOINT/BURSA W/O US: CPT | Performed by: PHYSICIAN ASSISTANT

## 2024-01-10 RX ORDER — BETAMETHASONE SODIUM PHOSPHATE AND BETAMETHASONE ACETATE 3; 3 MG/ML; MG/ML
12 INJECTION, SUSPENSION INTRA-ARTICULAR; INTRALESIONAL; INTRAMUSCULAR; SOFT TISSUE ONCE
Status: COMPLETED | OUTPATIENT
Start: 2024-01-10 | End: 2024-01-10

## 2024-01-10 RX ORDER — LIDOCAINE HYDROCHLORIDE 10 MG/ML
2 INJECTION, SOLUTION INFILTRATION; PERINEURAL ONCE
Status: COMPLETED | OUTPATIENT
Start: 2024-01-10 | End: 2024-01-10

## 2024-01-10 RX ORDER — BUPIVACAINE HYDROCHLORIDE 2.5 MG/ML
2 INJECTION, SOLUTION INFILTRATION; PERINEURAL ONCE
Status: COMPLETED | OUTPATIENT
Start: 2024-01-10 | End: 2024-01-10

## 2024-01-10 RX ADMIN — LIDOCAINE HYDROCHLORIDE 2 ML: 10 INJECTION, SOLUTION INFILTRATION; PERINEURAL at 10:52

## 2024-01-10 RX ADMIN — BETAMETHASONE SODIUM PHOSPHATE AND BETAMETHASONE ACETATE 12 MG: 3; 3 INJECTION, SUSPENSION INTRA-ARTICULAR; INTRALESIONAL; INTRAMUSCULAR; SOFT TISSUE at 10:49

## 2024-01-10 RX ADMIN — BUPIVACAINE HYDROCHLORIDE 5 MG: 2.5 INJECTION, SOLUTION INFILTRATION; PERINEURAL at 10:51

## 2024-01-10 RX ADMIN — BUPIVACAINE HYDROCHLORIDE 5 MG: 2.5 INJECTION, SOLUTION INFILTRATION; PERINEURAL at 10:50

## 2024-01-10 RX ADMIN — LIDOCAINE HYDROCHLORIDE 2 ML: 10 INJECTION, SOLUTION INFILTRATION; PERINEURAL at 10:51

## 2024-01-10 RX ADMIN — BETAMETHASONE SODIUM PHOSPHATE AND BETAMETHASONE ACETATE 12 MG: 3; 3 INJECTION, SUSPENSION INTRA-ARTICULAR; INTRALESIONAL; INTRAMUSCULAR; SOFT TISSUE at 10:50

## 2024-01-11 ENCOUNTER — APPOINTMENT (OUTPATIENT)
Dept: PHYSICAL THERAPY | Age: 44
End: 2024-01-11
Payer: COMMERCIAL

## 2024-01-12 ASSESSMENT — PATIENT HEALTH QUESTIONNAIRE - PHQ9
SUM OF ALL RESPONSES TO PHQ QUESTIONS 1-9: 0
SUM OF ALL RESPONSES TO PHQ9 QUESTIONS 1 & 2: 0
SUM OF ALL RESPONSES TO PHQ QUESTIONS 1-9: 0
1. LITTLE INTEREST OR PLEASURE IN DOING THINGS: 0
1. LITTLE INTEREST OR PLEASURE IN DOING THINGS: NOT AT ALL
SUM OF ALL RESPONSES TO PHQ QUESTIONS 1-9: 0
SUM OF ALL RESPONSES TO PHQ QUESTIONS 1-9: 0
SUM OF ALL RESPONSES TO PHQ9 QUESTIONS 1 & 2: 0
2. FEELING DOWN, DEPRESSED OR HOPELESS: NOT AT ALL
2. FEELING DOWN, DEPRESSED OR HOPELESS: 0

## 2024-01-15 ENCOUNTER — OFFICE VISIT (OUTPATIENT)
Dept: INTERNAL MEDICINE CLINIC | Age: 44
End: 2024-01-15

## 2024-01-15 ENCOUNTER — HOSPITAL ENCOUNTER (OUTPATIENT)
Age: 44
Setting detail: SPECIMEN
Discharge: HOME | End: 2024-01-15

## 2024-01-15 VITALS
BODY MASS INDEX: 45.64 KG/M2 | SYSTOLIC BLOOD PRESSURE: 116 MMHG | WEIGHT: 248 LBS | HEART RATE: 100 BPM | DIASTOLIC BLOOD PRESSURE: 72 MMHG | HEIGHT: 62 IN | OXYGEN SATURATION: 97 %

## 2024-01-15 DIAGNOSIS — E78.5 HYPERLIPIDEMIA, UNSPECIFIED HYPERLIPIDEMIA TYPE: ICD-10-CM

## 2024-01-15 DIAGNOSIS — G89.29 CHRONIC PAIN OF RIGHT KNEE: Primary | ICD-10-CM

## 2024-01-15 DIAGNOSIS — M25.561 CHRONIC PAIN OF RIGHT KNEE: Primary | ICD-10-CM

## 2024-01-15 NOTE — PROGRESS NOTES
No discharge.         Left eye: No discharge.      Conjunctiva/sclera: Conjunctivae normal.      Pupils: Pupils are equal, round, and reactive to light.   Neck:      Thyroid: No thyromegaly.      Vascular: No JVD.      Trachea: No tracheal deviation.   Cardiovascular:      Rate and Rhythm: Normal rate.      Heart sounds: Normal heart sounds. No murmur heard.     No gallop.   Pulmonary:      Effort: Pulmonary effort is normal. No respiratory distress.      Breath sounds: Normal breath sounds. No stridor. No wheezing or rales.   Chest:      Chest wall: No tenderness.   Abdominal:      General: Bowel sounds are normal. There is no distension.      Palpations: Abdomen is soft.      Tenderness: There is no abdominal tenderness. There is no guarding or rebound.   Musculoskeletal:         General: Normal range of motion.      Cervical back: Normal range of motion and neck supple.   Neurological:      Mental Status: She is alert and oriented to person, place, and time.         Assessment / Plan:   1. Chronic pain of right knee  Follows with Ortho    2. Hyperlipidemia, unspecified hyperlipidemia type  - Lipid Panel; Future    Patient is explained that on clinical exam I do not suspect, pedal edema, her last BNP was okay  I do not think patient will be benefited by Lasix, discussed with patient that Lasix can cause electrolyte imbalance, renal failure  Patient was understanding      No follow-ups on file.    Reviewed prior labs and health maintenance.      Discussed use, benefit, and side effects of prescribed medications.  Barriers to medication compliance addressed.  All patient questions answered.  Pt voiced understanding.         Usman Howell MD  [facility]  1/16/2024, 10:09 AM    Please note that this chart was generated using voice recognition Dragon dictation software.  Although every effort was made to ensure the accuracy of this automated transcription, some errors in transcription may have occurred.

## 2024-01-16 ENCOUNTER — APPOINTMENT (OUTPATIENT)
Dept: PHYSICAL THERAPY | Age: 44
End: 2024-01-16
Payer: COMMERCIAL

## 2024-01-16 LAB
CHOLEST SERPL-MCNC: 211 MG/DL
CHOLESTEROL/HDL RATIO: 5.1
HDLC SERPL-MCNC: 41 MG/DL
LDLC SERPL CALC-MCNC: 124 MG/DL (ref 0–130)
TRIGL SERPL-MCNC: 230 MG/DL

## 2024-01-16 ASSESSMENT — ENCOUNTER SYMPTOMS
CONSTIPATION: 0
EYE ITCHING: 0
CHEST TIGHTNESS: 0
COLOR CHANGE: 0
DIARRHEA: 0
BLOOD IN STOOL: 0
BACK PAIN: 0
EYE DISCHARGE: 0
CHOKING: 0
EYE REDNESS: 0
SHORTNESS OF BREATH: 0
ABDOMINAL PAIN: 0
EYE PAIN: 0
COUGH: 0
APNEA: 0
ABDOMINAL DISTENTION: 0

## 2024-01-22 ENCOUNTER — TELEPHONE (OUTPATIENT)
Dept: GASTROENTEROLOGY | Age: 44
End: 2024-01-22

## 2024-01-22 NOTE — TELEPHONE ENCOUNTER
Patient called in for her CT results- Results given.   Patient noted the periumbilical hernia.  Patient states she does have some discomfort when she bends over and wanted to know if the hernia could be the cause of  it?  Patient also wants to know if its just filled with fat, if she should be concerned if she loses weight?    Please advise.    Thank you,    Sejal

## 2024-01-23 ENCOUNTER — HOSPITAL ENCOUNTER (OUTPATIENT)
Dept: PHYSICAL THERAPY | Age: 44
Setting detail: THERAPIES SERIES
Discharge: HOME | End: 2024-01-23
Payer: COMMERCIAL

## 2024-01-23 NOTE — FLOWSHEET NOTE
[x] [facility]   Outpatient Rehabilitation & Therapy  [address]  P: [phone]   F: [phone]     Physical Therapy Cancel/No Show note    Date: 2024  Patient: Radhika Doty  : 1980  MRN: [id number]    Visit Count:   Cancels/No Shows to date:     For today's appointment patient:    [x]  Cancelled    [] Rescheduled appointment    [] No-show     Reason given by patient:    []  Patient ill    []  Conflicting appointment    [x] No transportation --transport cancelled on her d/t weather     [] Conflict with work    [] No reason given    [] Weather related    [] COVID-19    [] Other:      Comments:     [x] Next appointment was confirmed    Electronically signed by: Concha Walker, PT

## 2024-01-24 NOTE — TELEPHONE ENCOUNTER
Discussed with Dr Cote, writer called pt no answer lvm adv pt if hernia is causing issues we can refer to general surgeon to evaluate for possible surgery if needed, but per Dr Cote should not be concerned if not having any issues. Adv pt to return call

## 2024-01-25 ENCOUNTER — HOSPITAL ENCOUNTER (OUTPATIENT)
Dept: PHYSICAL THERAPY | Age: 44
Setting detail: THERAPIES SERIES
Discharge: HOME | End: 2024-01-25
Payer: COMMERCIAL

## 2024-01-25 PROCEDURE — 97110 THERAPEUTIC EXERCISES: CPT

## 2024-01-25 NOTE — FLOWSHEET NOTE
planes unrestricted at PLOF - PROGRESSING TOWARDS 12/12  Pt will demonstrate ability to negotiate 1 flight of stairs reciprocally with minimal handrail use to show improved functional mobility and efficiency at her PLOF -NOT PROGRESSING 12/12 (one step at a time and heavily relying on hand rail)  Pt will improve R tandem stance EO to 30 seconds to show improved proprioception/stability in a narrowed MAXIMO with less risk of injury to R knee. GOAL MET 9/28  LTG: (to be met in 20 treatments)  Pt will demonstrate improved R LE strength to 4+/5 or greater in order to demonstrate improved stability/strength necessary for unrestricted ADLs/work activities -PROGRESSING TOWARDS 12/12  Pt will improve R SLS to 10 seconds or greater to demonstrate improved symmetrical WB tolerance/proprioception through isolated stance phase activities for minimal risk of future re-injury -GOAL MET 12/12  Pt will improve LEFS to 70% function or greater in order to demonstrate improved functional tolerances at PLOF with minimal restriction/dysfunction -PROGRESSING TOWARDS 12/12  Pt will demonstrate independence with a long term HEP for continued progress/maintenance after completion of PT -GOAL MET 12/12  Pt will improve R SLS to 15 seconds or greater to demonstrate improved symmetrical WB tolerance/proprioception through isolated stance phase activities for minimal risk of future re-injury -GOAL ADDED 12/12       Pt. Education:  [x] Yes  [] No  [] Reviewed Prior HEP/Ed  Method of Education: [x] Verbal  [x] Demo  [x] Written  Comprehension of Education:  [x] Verbalizes understanding.  [x] Demonstrates understanding.  [] Needs review.  [] Demonstrates/verbalizes HEP/Ed previously given.     Access Code: [email or web address]  URL: [email or web address]  Date: 11/14/2023  Prepared by: Concha Walker    Exercises  - Seated Long Arc Quad  - 1 x daily - 7 x weekly - 3 sets - 10 reps - 3 hold  - Standing Knee Flexion with Counter Support  - 1 x daily - 7 x

## 2024-02-01 ENCOUNTER — HOSPITAL ENCOUNTER (OUTPATIENT)
Dept: PHYSICAL THERAPY | Age: 44
Setting detail: THERAPIES SERIES
Discharge: HOME | End: 2024-02-01
Payer: COMMERCIAL

## 2024-02-01 PROCEDURE — 97110 THERAPEUTIC EXERCISES: CPT

## 2024-02-01 NOTE — FLOWSHEET NOTE
[x] [facility]   Outpatient Rehabilitation & Therapy  [address]  P: [phone]   F: [phone]    Physical Therapy Daily Treatment Note      Date:  2024  Patient Name:  Radhika Doty    :  1980  MRN: [id number]  Physician: Aniket Sahni PA-C          [facility]   ** Approval valid  - 10/27: 36 units [id number] & [id number] **   **SECOND Auth: Approved 10/16/23-23 18 units [id number]; [id number] **  **THIRD AUTH: Approved  - 3/12/2024**  Diagnosis: Z98.890 (ICD-10-CM) - S/P right knee arthroscopy  Rehab Codes: M25.561, M25.661, R53.1, R26.9                                 Onset Date: DOS 23                    Next  Appt: PRN  Total visits attended:               Cancels/No shows:       Subjective:     patient arrived and states she has been helping her daughter move to [place] and has been \"all out of sorts\" states she forgot she is very stiff and sore in entire body due to moving heavy items. Does feel more soreness in R knee from activity as well.     Pain:  [x] Yes  [] No Location: R knee Pain Rating: (0-10 scale) 5/10  Pain altered Tx:  [x] No  [] Yes  Action:    Objective:  Modalities: ice pack to R knee in supine 5' post exercise   Precautions:  Exercises:  Exercise Reps/ Time Weight/ Level Completed  Today Comments   Supine        HS stretch  30\"x3      SLR 10x2 1# x Cueing required for eccentric control   Added weight   Increased weight    Heel slides - hold at end range with slide board 10x3\" hold    OP from treating therapist    Reverse Clamshells 10 x 2 eac  x Added           Prone        Quad stretch  30\"x3   x    Hs curls  10x3\" hold  1#   Added weight 11           Seated        Flexion hold  10x3\"  yellow x Self assist with other LE   Added resistance    LAQ 15x3\"  1# x  no catching or clicking noted this session  Added weight  and increased reps   Schwinn bike  5'   Added  lowest seat setting,backwards

## 2024-02-06 ENCOUNTER — HOSPITAL ENCOUNTER (OUTPATIENT)
Dept: PHYSICAL THERAPY | Age: 44
Setting detail: THERAPIES SERIES
Discharge: HOME | End: 2024-02-06
Payer: COMMERCIAL

## 2024-02-06 PROCEDURE — 97110 THERAPEUTIC EXERCISES: CPT

## 2024-02-06 NOTE — FLOWSHEET NOTE
[x] [facility]   Outpatient Rehabilitation & Therapy  [address]  P: [phone]   F: [phone]    Physical Therapy Daily Treatment Note      Date:  2024  Patient Name:  Radhika Doty    :  1980  MRN: [id number]  Physician: Aniket Sahni PA-C          Insurance: [facility].   ** Approval valid  - 10/27: 36 units [id number] & [id number] **   **SECOND Auth: Approved 10/16/23-23 18 units [id number]; [id number] **  **THIRD AUTH: Approved  - 3/12/2024**  Diagnosis: Z98.890 (ICD-10-CM) - S/P right knee arthroscopy  Rehab Codes: M25.561, M25.661, R53.1, R26.9                                 Onset Date: DOS 23                    Next  Appt: PRN  Total visits attended:               Cancels/No shows: 5/3      Subjective:    2/ Pt comes in stating that sh is sore again and thinks it is d/t her picking up extra shifts at work with working 8 hour shift over the past 2-3 days. Notes that when she had the Cortizone shots she does not think they are working anymore.     Pain:  [x] Yes  [] No Location: R knee Pain Rating: (0-10 scale) 5/10  Pain altered Tx:  [x] No  [] Yes  Action:    Objective:  Modalities: ice pack to R knee in supine 5' post exercise   Precautions: none  Exercises:  Exercise Reps/ Time Weight/ Level Completed  Today Comments   Supine        HS stretch  30\"x3      SLR 12x2 1# x Cueing required for eccentric control   Added weight   Increased weight   Increased reps 2/6   Heel slides - hold at end range with slide board 10x3\" hold    OP from treating therapist    Reverse Clamshells 12 x 2 eac  x Added   Increased reps 2/6          Prone        Quad stretch  30\"x3       Hs curls  10x3\" hold  1#   Added weight 11/           Seated        Flexion hold  10x3\"  yellow  Self assist with other LE   Added resistance    LAQ 15x3\"  1#   no catching or clicking noted this session  Added weight 1/09 and increased reps   Schwinn bike  5'   Added

## 2024-02-08 ENCOUNTER — HOSPITAL ENCOUNTER (OUTPATIENT)
Dept: PHYSICAL THERAPY | Age: 44
Setting detail: THERAPIES SERIES
Discharge: HOME | End: 2024-02-08
Payer: COMMERCIAL

## 2024-02-08 PROCEDURE — 97110 THERAPEUTIC EXERCISES: CPT

## 2024-02-08 NOTE — FLOWSHEET NOTE
[x] [facility]   Outpatient Rehabilitation & Therapy  [address]  P: [phone]   F: [phone]    Physical Therapy Daily Treatment Note      Date:  2024  Patient Name:  Radhika Doty    :  1980  MRN: [id number]  Physician: Aniket Sahni PA-C          Insurance: [facility].   ** Approval valid  - 10/27: 36 units [id number] & [id number] **   **SECOND Auth: Approved 10/16/23-23 18 units [id number]; [id number] **  **THIRD AUTH: Approved  - 3/12/2024**  Diagnosis: Z98.890 (ICD-10-CM) - S/P right knee arthroscopy  Rehab Codes: M25.561, M25.661, R53.1, R26.9                                 Onset Date: DOS 23                    Next  Appt: PRN  Total visits attended:               Cancels/No shows: 5/3    Subjective:     Pt comes in stating she was a little sore after last session but overall feels okay.    Pain:  [x] Yes  [] No Location: DICKSON knees Pain Rating: (0-10 scale) 3/10  Pain altered Tx:  [x] No  [] Yes  Action:    Objective:  Modalities: ice pack to R knee in supine 5' post exercise   Precautions: none  Exercises:  Exercise Reps/ Time Weight/ Level Completed  Today Comments   Supine        HS stretch  30\"x3      SLR 12x2 1#  Cueing required for eccentric control   Added weight   Increased weight   Increased reps 2/6   Heel slides - hold at end range with slide board 10x3\" hold    OP from treating therapist    Reverse Clamshells 12 x 2 eac   Added   Increased reps 2/6          Prone        Quad stretch  30\"x3       Hs curls  10x3\" hold  1#   Added weight 11           Seated        Flexion hold  10x3\"  yellow  Self assist with other LE   Added resistance    LAQ 10x5\"  green x  no catching or clicking noted this session  Added weight  and increased reps  REVIEWED HEP    Schwinn bike  5'   Added  lowest seat setting,backwards pedaling caused relief of pain this date    Nustep  5 mins 3   added    Standing        Eccentric taps

## 2024-02-13 ENCOUNTER — HOSPITAL ENCOUNTER (OUTPATIENT)
Dept: PHYSICAL THERAPY | Age: 44
Setting detail: THERAPIES SERIES
Discharge: HOME | End: 2024-02-13
Payer: COMMERCIAL

## 2024-02-13 NOTE — FLOWSHEET NOTE
[x] [facility]   Outpatient Rehabilitation & Therapy  [address]  P: [phone]   F: [phone]     Physical Therapy Cancel/No Show note    Date: 2024  Patient: Radhika Doty  : 1980  MRN: [id number]    Visit Count:   Cancels/No Shows to date: 6/3    For today's appointment patient:    [x]  Cancelled    [] Rescheduled appointment    [] No-show     Reason given by patient:    []  Patient ill    []  Conflicting appointment    [x] No transportation --transport late at coming to get her     [] Conflict with work    [] No reason given    [] Weather related    [] COVID-19    [] Other:      Comments:        [x] Next appointment was confirmed    Electronically signed by: Concha Walker, PT

## 2024-02-14 ENCOUNTER — HOSPITAL ENCOUNTER (OUTPATIENT)
Dept: WOMENS IMAGING | Age: 44
Discharge: HOME | End: 2024-02-16
Payer: COMMERCIAL

## 2024-02-14 DIAGNOSIS — Z12.31 OTHER SCREENING MAMMOGRAM: ICD-10-CM

## 2024-02-14 PROCEDURE — 77067 SCR MAMMO BI INCL CAD: CPT

## 2024-02-15 ENCOUNTER — HOSPITAL ENCOUNTER (OUTPATIENT)
Dept: PHYSICAL THERAPY | Age: 44
Setting detail: THERAPIES SERIES
Discharge: HOME | End: 2024-02-15
Payer: COMMERCIAL

## 2024-02-15 PROCEDURE — 97110 THERAPEUTIC EXERCISES: CPT

## 2024-02-15 NOTE — FLOWSHEET NOTE
coming up soon with pt reporting to understand.    [x] Patient would continue to benefit from skilled physical therapy services in order to: improve pain, ROM, strength, and functionally mobility    Goals: Assessed/Updated 12/12/2023 --all goals extended through 31 visits  STG: (to be met in 6 treatments)  Pt will self report worst pain no greater than 2/10 in order to better tolerate ADLs/work activities with minimal dysfunction -NOT PROGRESSING 12/12  Pt will improve AROM in R knee to symmetrical values (0-120 deg flexion) in order to demonstrate ability to move/reach in all planes unrestricted at PLOF - PROGRESSING TOWARDS 12/12  Pt will demonstrate ability to negotiate 1 flight of stairs reciprocally with minimal handrail use to show improved functional mobility and efficiency at her PLOF -NOT PROGRESSING 12/12 (one step at a time and heavily relying on hand rail)  Pt will improve R tandem stance EO to 30 seconds to show improved proprioception/stability in a narrowed MAXIMO with less risk of injury to R knee. GOAL MET 9/28  LTG: (to be met in 20 treatments)  Pt will demonstrate improved R LE strength to 4+/5 or greater in order to demonstrate improved stability/strength necessary for unrestricted ADLs/work activities -PROGRESSING TOWARDS 12/12  Pt will improve R SLS to 10 seconds or greater to demonstrate improved symmetrical WB tolerance/proprioception through isolated stance phase activities for minimal risk of future re-injury -GOAL MET 12/12  Pt will improve LEFS to 70% function or greater in order to demonstrate improved functional tolerances at PLOF with minimal restriction/dysfunction -PROGRESSING TOWARDS 12/12  Pt will demonstrate independence with a long term HEP for continued progress/maintenance after completion of PT -GOAL MET 12/12  Pt will improve R SLS to 15 seconds or greater to demonstrate improved symmetrical WB tolerance/proprioception through isolated stance phase activities for minimal risk of

## 2024-02-20 ENCOUNTER — HOSPITAL ENCOUNTER (OUTPATIENT)
Dept: PHYSICAL THERAPY | Age: 44
Setting detail: THERAPIES SERIES
Discharge: HOME | End: 2024-02-20
Payer: COMMERCIAL

## 2024-02-20 PROCEDURE — 97110 THERAPEUTIC EXERCISES: CPT

## 2024-02-20 NOTE — FLOWSHEET NOTE
session and keep up with her HEP. Educated on next session being progress note. No questions or concerns at the end of the session.    [x] Patient would continue to benefit from skilled physical therapy services in order to: improve pain, ROM, strength, and functionally mobility    Goals: Assessed/Updated 12/12/2023 --all goals extended through 31 visits  STG: (to be met in 6 treatments)  Pt will self report worst pain no greater than 2/10 in order to better tolerate ADLs/work activities with minimal dysfunction -NOT PROGRESSING 12/12  Pt will improve AROM in R knee to symmetrical values (0-120 deg flexion) in order to demonstrate ability to move/reach in all planes unrestricted at PLOF - PROGRESSING TOWARDS 12/12  Pt will demonstrate ability to negotiate 1 flight of stairs reciprocally with minimal handrail use to show improved functional mobility and efficiency at her PLOF -NOT PROGRESSING 12/12 (one step at a time and heavily relying on hand rail)  Pt will improve R tandem stance EO to 30 seconds to show improved proprioception/stability in a narrowed MAXIMO with less risk of injury to R knee. GOAL MET 9/28  LTG: (to be met in 20 treatments)  Pt will demonstrate improved R LE strength to 4+/5 or greater in order to demonstrate improved stability/strength necessary for unrestricted ADLs/work activities -PROGRESSING TOWARDS 12/12  Pt will improve R SLS to 10 seconds or greater to demonstrate improved symmetrical WB tolerance/proprioception through isolated stance phase activities for minimal risk of future re-injury -GOAL MET 12/12  Pt will improve LEFS to 70% function or greater in order to demonstrate improved functional tolerances at PLOF with minimal restriction/dysfunction -PROGRESSING TOWARDS 12/12  Pt will demonstrate independence with a long term HEP for continued progress/maintenance after completion of PT -GOAL MET 12/12  Pt will improve R SLS to 15 seconds or greater to demonstrate improved symmetrical WB

## 2024-02-29 ENCOUNTER — APPOINTMENT (OUTPATIENT)
Dept: PHYSICAL THERAPY | Age: 44
End: 2024-02-29
Payer: COMMERCIAL

## 2024-03-04 ENCOUNTER — TELEPHONE (OUTPATIENT)
Dept: GASTROENTEROLOGY | Age: 44
End: 2024-03-04

## 2024-03-05 ENCOUNTER — ANESTHESIA EVENT (OUTPATIENT)
Dept: OPERATING ROOM | Age: 44
End: 2024-03-05
Payer: COMMERCIAL

## 2024-03-05 ENCOUNTER — ANESTHESIA (OUTPATIENT)
Dept: OPERATING ROOM | Age: 44
End: 2024-03-05
Payer: COMMERCIAL

## 2024-03-05 ENCOUNTER — HOSPITAL ENCOUNTER (OUTPATIENT)
Age: 44
Setting detail: OUTPATIENT SURGERY
Discharge: HOME | End: 2024-03-05
Attending: INTERNAL MEDICINE | Admitting: INTERNAL MEDICINE
Payer: COMMERCIAL

## 2024-03-05 VITALS
TEMPERATURE: 97.7 F | HEIGHT: 62 IN | RESPIRATION RATE: 13 BRPM | DIASTOLIC BLOOD PRESSURE: 64 MMHG | SYSTOLIC BLOOD PRESSURE: 116 MMHG | WEIGHT: 250.7 LBS | BODY MASS INDEX: 46.14 KG/M2 | HEART RATE: 74 BPM | OXYGEN SATURATION: 100 %

## 2024-03-05 DIAGNOSIS — Z83.719 FAMILY HISTORY OF COLON POLYPS, UNSPECIFIED: ICD-10-CM

## 2024-03-05 DIAGNOSIS — K58.0 IRRITABLE BOWEL SYNDROME WITH DIARRHEA: ICD-10-CM

## 2024-03-05 DIAGNOSIS — R19.7 DIARRHEA, UNSPECIFIED TYPE: ICD-10-CM

## 2024-03-05 DIAGNOSIS — K21.9 GASTROESOPHAGEAL REFLUX DISEASE, UNSPECIFIED WHETHER ESOPHAGITIS PRESENT: ICD-10-CM

## 2024-03-05 DIAGNOSIS — Z80.0 FAMILY HX OF COLON CANCER: ICD-10-CM

## 2024-03-05 LAB — HCG SERPL QL: NEGATIVE

## 2024-03-05 PROCEDURE — 2500000003 HC RX 250 WO HCPCS: Performed by: NURSE ANESTHETIST, CERTIFIED REGISTERED

## 2024-03-05 PROCEDURE — 2709999900 HC NON-CHARGEABLE SUPPLY: Performed by: INTERNAL MEDICINE

## 2024-03-05 PROCEDURE — 2580000003 HC RX 258: Performed by: ANESTHESIOLOGY

## 2024-03-05 PROCEDURE — 7100000011 HC PHASE II RECOVERY - ADDTL 15 MIN: Performed by: INTERNAL MEDICINE

## 2024-03-05 PROCEDURE — 3609010300 HC COLONOSCOPY W/BIOPSY SINGLE/MULTIPLE: Performed by: INTERNAL MEDICINE

## 2024-03-05 PROCEDURE — 3700000001 HC ADD 15 MINUTES (ANESTHESIA): Performed by: INTERNAL MEDICINE

## 2024-03-05 PROCEDURE — 6360000002 HC RX W HCPCS: Performed by: NURSE ANESTHETIST, CERTIFIED REGISTERED

## 2024-03-05 PROCEDURE — 7100000010 HC PHASE II RECOVERY - FIRST 15 MIN: Performed by: INTERNAL MEDICINE

## 2024-03-05 PROCEDURE — 88342 IMHCHEM/IMCYTCHM 1ST ANTB: CPT

## 2024-03-05 PROCEDURE — 3609012400 HC EGD TRANSORAL BIOPSY SINGLE/MULTIPLE: Performed by: INTERNAL MEDICINE

## 2024-03-05 PROCEDURE — 88305 TISSUE EXAM BY PATHOLOGIST: CPT

## 2024-03-05 PROCEDURE — 3700000000 HC ANESTHESIA ATTENDED CARE: Performed by: INTERNAL MEDICINE

## 2024-03-05 PROCEDURE — 84703 CHORIONIC GONADOTROPIN ASSAY: CPT

## 2024-03-05 RX ORDER — SODIUM CHLORIDE 0.9 % (FLUSH) 0.9 %
5-40 SYRINGE (ML) INJECTION EVERY 12 HOURS SCHEDULED
Status: DISCONTINUED | OUTPATIENT
Start: 2024-03-05 | End: 2024-03-05 | Stop reason: HOSPADM

## 2024-03-05 RX ORDER — PROPOFOL 10 MG/ML
INJECTION, EMULSION INTRAVENOUS PRN
Status: DISCONTINUED | OUTPATIENT
Start: 2024-03-05 | End: 2024-03-05 | Stop reason: SDUPTHER

## 2024-03-05 RX ORDER — OMEPRAZOLE 20 MG/1
20 CAPSULE, DELAYED RELEASE ORAL
Qty: 180 CAPSULE | Refills: 1 | Status: SHIPPED | OUTPATIENT
Start: 2024-03-05

## 2024-03-05 RX ORDER — LIDOCAINE HYDROCHLORIDE 10 MG/ML
1 INJECTION, SOLUTION EPIDURAL; INFILTRATION; INTRACAUDAL; PERINEURAL
Status: DISCONTINUED | OUTPATIENT
Start: 2024-03-06 | End: 2024-03-05 | Stop reason: HOSPADM

## 2024-03-05 RX ORDER — SODIUM CHLORIDE, SODIUM LACTATE, POTASSIUM CHLORIDE, CALCIUM CHLORIDE 600; 310; 30; 20 MG/100ML; MG/100ML; MG/100ML; MG/100ML
INJECTION, SOLUTION INTRAVENOUS CONTINUOUS
Status: DISCONTINUED | OUTPATIENT
Start: 2024-03-05 | End: 2024-03-05 | Stop reason: HOSPADM

## 2024-03-05 RX ORDER — SODIUM CHLORIDE 9 MG/ML
INJECTION, SOLUTION INTRAVENOUS CONTINUOUS
Status: DISCONTINUED | OUTPATIENT
Start: 2024-03-05 | End: 2024-03-05 | Stop reason: HOSPADM

## 2024-03-05 RX ORDER — SODIUM CHLORIDE 0.9 % (FLUSH) 0.9 %
5-40 SYRINGE (ML) INJECTION PRN
Status: DISCONTINUED | OUTPATIENT
Start: 2024-03-05 | End: 2024-03-05 | Stop reason: HOSPADM

## 2024-03-05 RX ORDER — SODIUM CHLORIDE 9 MG/ML
INJECTION, SOLUTION INTRAVENOUS PRN
Status: DISCONTINUED | OUTPATIENT
Start: 2024-03-05 | End: 2024-03-05 | Stop reason: HOSPADM

## 2024-03-05 RX ORDER — LIDOCAINE HYDROCHLORIDE 20 MG/ML
INJECTION, SOLUTION EPIDURAL; INFILTRATION; INTRACAUDAL; PERINEURAL PRN
Status: DISCONTINUED | OUTPATIENT
Start: 2024-03-05 | End: 2024-03-05 | Stop reason: SDUPTHER

## 2024-03-05 RX ADMIN — PROPOFOL 50 MG: 10 INJECTION, EMULSION INTRAVENOUS at 10:52

## 2024-03-05 RX ADMIN — SODIUM CHLORIDE, POTASSIUM CHLORIDE, SODIUM LACTATE AND CALCIUM CHLORIDE: 600; 310; 30; 20 INJECTION, SOLUTION INTRAVENOUS at 10:02

## 2024-03-05 RX ADMIN — PROPOFOL 50 MG: 10 INJECTION, EMULSION INTRAVENOUS at 11:02

## 2024-03-05 RX ADMIN — PROPOFOL 50 MG: 10 INJECTION, EMULSION INTRAVENOUS at 10:42

## 2024-03-05 RX ADMIN — PROPOFOL 50 MG: 10 INJECTION, EMULSION INTRAVENOUS at 10:39

## 2024-03-05 RX ADMIN — PROPOFOL 100 MG: 10 INJECTION, EMULSION INTRAVENOUS at 10:33

## 2024-03-05 RX ADMIN — PROPOFOL 50 MG: 10 INJECTION, EMULSION INTRAVENOUS at 10:36

## 2024-03-05 RX ADMIN — PROPOFOL 50 MG: 10 INJECTION, EMULSION INTRAVENOUS at 10:45

## 2024-03-05 RX ADMIN — PROPOFOL 50 MG: 10 INJECTION, EMULSION INTRAVENOUS at 10:48

## 2024-03-05 RX ADMIN — PROPOFOL 50 MG: 10 INJECTION, EMULSION INTRAVENOUS at 10:57

## 2024-03-05 RX ADMIN — LIDOCAINE HYDROCHLORIDE 100 MG: 20 INJECTION, SOLUTION EPIDURAL; INFILTRATION; INTRACAUDAL; PERINEURAL at 10:32

## 2024-03-05 ASSESSMENT — PAIN - FUNCTIONAL ASSESSMENT
PAIN_FUNCTIONAL_ASSESSMENT: 0-10
PAIN_FUNCTIONAL_ASSESSMENT: NONE - DENIES PAIN

## 2024-03-05 ASSESSMENT — ENCOUNTER SYMPTOMS: SHORTNESS OF BREATH: 0

## 2024-03-05 ASSESSMENT — PAIN DESCRIPTION - DESCRIPTORS: DESCRIPTORS: ACHING;SHARP;BURNING

## 2024-03-05 NOTE — OP NOTE
PROCEDURE NOTE    DATE OF PROCEDURE: 3/5/2024    SURGEON: Abhay Cote MD    ASSISTANT: None    PREOPERATIVE DIAGNOSIS: DIARRHEA  IBS  SCREENING    POSTOPERATIVE DIAGNOSIS: as described below    OPERATION: Total colonoscopy   RANDOM BIOPSIES  ANESTHESIA: MAC PER ANESTHESIA     ESTIMATED BLOOD LOSS: less than 50     COMPLICATIONS: None.     SPECIMENS:  Was Obtained:     HISTORY: The patient is a 43 y.o. year old female with history of above preop diagnosis.  I recommended colonoscopy with possible biopsy or polypectomy and I explained the risk, benefits, expected outcome, and alternatives to the procedure.  Risks included but are not limited to bleeding, infection, respiratory distress, hypotension, and perforation of the colon and possibility of missing a lesion.  The patient understands and is in agreement.      PROCEDURE: The patient was given IV conscious sedation.  The patient's SPO2 remained above 90% throughout the procedure. The colonoscope was inserted per rectum and advanced under direct vision to the cecum without difficulty.  The prep was good.    SPASTIC COLON WITH SOME PASTY STOOLS    Findings:  Terminal ileum: normal    Cecum/Ascending colon: normal  RANDOM BIOPSIES WERE TAKEN   Transverse colon: normal    Descending/Sigmoid colon: normal    Rectum/Anus: examined in normal and retroflexed positions and was abnormal: INT HEMORRHOIDS WITH SKIN TAGS     Withdrawal Time was (minutes): 10    The colon was decompressed and the scope was removed.  The patient tolerated the procedure well.     Recommendations/Plan:   Lifestyle and dietary modifications as discussed  F/U Biopsies  F/U In Office in 3-4 weeks  Discussed with the family  Colonoscopy Recall :10 year  POST SEDATION PATIENT WAS STABLE WITH STABLE VITAL SIGNS AND OXYGEN SATURATIONS AND WAS DISCHARGED HOME WITH RIDE IN A STABLE CONDITION.    Electronically signed by Abhay Cote MD  on 3/5/2024 at 11:01 AM

## 2024-03-05 NOTE — H&P
urination, hot or cold intolerance  MUSCULOSKELETAL: Arthritis negative swelling of joints  NEUROLOGICAL: negative for headaches, dizziness, lightheadedness, numbness, pain, tingling extremities  BEHAVIOR/PSYCH:  negative for depression, anxiety    Physical Exam:   /67   Pulse 96   Temp 97.5 °F (36.4 °C)   Resp 12   Ht 1.575 m (5' 2\")   Wt 113.7 kg (250 lb 11.2 oz)   SpO2 97%   BMI 45.85 kg/m²   No LMP recorded.    No results for input(s): \"POCGLU\" in the last 72 hours.    General Appearance:  alert, well appearing, and in no acute distress  Mental status: oriented to person, place, and time with normal affect  Head:  normocephalic, atraumatic.  Eye: no icterus, redness, pupils equal and reactive, extraocular eye movements intact, conjunctiva clear  Ear: normal external ear, no discharge, hearing intact  Nose:  no drainage noted  Mouth: mucous membranes moist  Neck: supple, no carotid bruits, thyroid not palpable  Lungs: Bilateral equal air entry, clear to ausculation, no wheezing, rales or rhonchi, normal effort  Cardiovascular: HR 96 normal rate, regular rhythm, no murmur, gallop, rub.  Abdomen: Obese, epigastric tenderness. Soft, nondistended, normal bowel sounds  Neurologic: There are no new focal motor or sensory deficits, normal muscle tone and bulk, no abnormal sensation, normal speech, cranial nerves II through XII grossly intact  Skin: Hives R forearm- patient states from stress No gross bruising or bleeding on exposed skin area  Extremities:  peripheral pulses palpable, no pedal edema or calf pain with palpation  Psych: normal affect     Investigations:      Laboratory Testing:  No results found for this or any previous visit (from the past 24 hour(s)).    No results for input(s): \"HGB\", \"HCT\", \"WBC\", \"MCV\", \"PLATELET\", \"NA\", \"K\", \"CL\", \"CO2\", \"BUN\", \"CREATININE\", \"GLUCOSE\", \"INR\", \"PROTIME\", \"APTT\", \"AST\", \"ALT\", \"LABALBU\", \"HCG\" in the last 720 hours.    No results for input(s):

## 2024-03-05 NOTE — ANESTHESIA PRE PROCEDURE
Department of Anesthesiology  Preprocedure Note       Name:  Radhika Doty   Age:  43 y.o.  :  1980                                          MRN:  [id number]         Date:  3/5/2024      Surgeon: Surgeon(s):  Abhay Cote MD    Procedure: Procedure(s):  ESOPHAGOGASTRODUODENOSCOPY  COLONOSCOPY DIAGNOSTIC    Medications prior to admission:   Prior to Admission medications    Medication Sig Start Date End Date Taking? Authorizing Provider   VENTOLIN  (90 Base) MCG/ACT inhaler INHALE 2 PUFFS BY MOUTH EVERY 4 (FOUR) HOURS AS NEEDED FOR SHORTNESS OF BREATH 23   ProviderFanny MD   albuterol sulfate HFA (PROVENTIL;VENTOLIN;PROAIR) 108 (90 Base) MCG/ACT inhaler Inhale 2 puffs into the lungs every 4 hours as needed 11/15/23   Fanny Chavez MD   albuterol (PROVENTIL) (2.5 MG/3ML) 0.083% nebulizer solution Inhale 1 VIAL by nebulization every 6 hours as needed for wheezing. 23   ProviderFanny MD   acetaminophen (TYLENOL) 325 MG tablet Take 2 tablets by mouth every 6 hours as needed for Pain    Provider, MD Fanny   polyethylene glycol (GLYCOLAX) 17 GM/SCOOP powder Please follow instructions provided to you by your provider. 23   Abhay Cote MD   bisacodyl 5 MG EC tablet Please follow instructions given to you by your provider. 23   Abhay Cote MD   celecoxib (CELEBREX) 200 MG capsule Take 1 capsule by mouth daily 10/9/23   Mir Sahni PA   fluticasone (FLONASE) 50 MCG/ACT nasal spray 1 spray by Each Nostril route daily 3/15/23   Rochelle Phillips MD       Current medications:    Current Facility-Administered Medications   Medication Dose Route Frequency Provider Last Rate Last Admin   • [START ON 3/6/2024] lidocaine PF 1 % injection 1 mL  1 mL IntraDERmal Once PRN Eugene Diop MD       • 0.9 % sodium chloride infusion   IntraVENous Continuous Eugene Diop MD       • lactated ringers IV soln infusion   IntraVENous

## 2024-03-05 NOTE — DISCHARGE INSTRUCTIONS
have questions about a medical condition or this instruction, always ask your healthcare professional. [employer] disclaims any warranty or liability for your use of this information.  Content Version: 9.9.375698; Last Revised: February 20, 2013

## 2024-03-05 NOTE — OP NOTE
PROCEDURE NOTE    DATE OF PROCEDURE: 3/5/2024     SURGEON: Abhay Cote MD    ASSISTANT: None    PREOPERATIVE DIAGNOSIS: GERD  THROAT IRRITATIONS  EPIG PAIN  IBS DIARRHEA     POSTOPERATIVE DIAGNOSIS: As described below    OPERATION: Upper GI endoscopy with Biopsy    ANESTHESIA: MAC PER ANESTHESIA     ESTIMATED BLOOD LOSS: Less than 50 ml    COMPLICATIONS: None.     SPECIMENS:  Was Obtained:     HISTORY: The patient is a 43 y.o. year old female with history of above preop diagnosis.  I recommended esophagogastroduodenoscopy with possible biopsy and I explained the risk, benefits, expected outcome, and alternatives to the procedure.  Risks included but are not limited to bleeding, infection, respiratory distress, hypotension, and perforation of the esophagus, stomach, or duodenum.  Patient understands and is in agreement.    PROCEDURE: The patient was given IV conscious sedation.  The patient's SPO2 remained above 90% throughout the procedure. The gastroscope was inserted orally and advanced under direct vision through the esophagus, through the stomach, through the pylorus, and into the descending duodenum.      Findings:    Retropharyngeal area was grossly normal appearing    Esophagus: abnormal: MILD IRREGULAR SCJ WAS NOTED  BIOPSIES AND PICTURES WERE TAKEN     Stomach:    Fundus: normal    Body: normal    Antrum: abnormal: SUP 4-5 MM EROSIVE ULCER  NO STIGMATA OF BLEEDING  BIOPSIES AND PICTURES WERE TAKEN     Duodenum:     Descending: normal  RANDOM BIOPSIES     Bulb: normal    The scope was removed and the patient tolerated the procedure well.     Recommendations/Plan:   F/U Biopsies  F/U In Office in 3-4 weeks  Discussed with the family  Post sedation patient was stable with stable vital signs and stable O2 saturations    Electronically signed by Abhay Cote MD  on 3/5/2024 at 10:40 AM

## 2024-03-05 NOTE — ANESTHESIA POSTPROCEDURE EVALUATION
Department of Anesthesiology  Postprocedure Note    Patient: Radhika Doty  MRN: [id number]  YOB: 1980  Date of evaluation: 3/5/2024    Procedure Summary       Date: 03/05/24 Room / Location: [address]    Anesthesia Start: 1030 Anesthesia Stop: 1108    Procedures:       ESOPHAGOGASTRODUODENOSCOPY BIOPSY      COLONOSCOPY BIOPSY Diagnosis:       Diarrhea, unspecified type      Irritable bowel syndrome with diarrhea      Gastroesophageal reflux disease, unspecified whether esophagitis present      Family hx of colon cancer      Family history of colon polyps, unspecified      (Diarrhea, unspecified type [R19.7])      (Irritable bowel syndrome with diarrhea [K58.0])      (Gastroesophageal reflux disease, unspecified whether esophagitis present [K21.9])      (Family hx of colon cancer [Z80.0])      (Family history of colon polyps, unspecified [Z83.719])    Surgeons: Abhay Cote MD Responsible Provider: Meena Marion MD    Anesthesia Type: general ASA Status: 3            Anesthesia Type: No value filed.    Haim Phase I: Haim Score: 10    Haim Phase II: Haim Score: 8    Anesthesia Post Evaluation    Cardiovascular status: hemodynamically stable    No notable events documented.

## 2024-03-06 LAB — SURGICAL PATHOLOGY REPORT: NORMAL

## 2024-03-07 ENCOUNTER — HOSPITAL ENCOUNTER (OUTPATIENT)
Dept: PHYSICAL THERAPY | Age: 44
Setting detail: THERAPIES SERIES
Discharge: HOME | End: 2024-03-07

## 2024-03-07 NOTE — FLOWSHEET NOTE
[] [facility]   Outpatient Rehabilitation & Therapy  [address]  P: [phone]   F: [phone]     Physical Therapy Cancel/No Show note    Date: 3/7/2024  Patient: Radhika Doty  : 1980  MRN: [id number]    Visit Count:   Cancels/No Shows to date: 7/3    For today's appointment patient:    [x]  Cancelled    [] Rescheduled appointment    [] No-show     Reason given by patient:    []  Patient ill    []  Conflicting appointment    [] No transportation      [] Conflict with work    [] No reason given    [] Weather related    [] COVID-19    [x] Other:   complications with biopsy -rescheduling 2 weeks out per     Comments:        [] Next appointment was confirmed    Electronically signed by: Concha Walker, PT

## 2024-03-18 ENCOUNTER — TELEPHONE (OUTPATIENT)
Dept: GASTROENTEROLOGY | Age: 44
End: 2024-03-18

## 2024-03-18 NOTE — TELEPHONE ENCOUNTER
Patient called requesting to reschedule tomorrow appt due to lack of transportation. Scheduled next available 5/7, if able to accommodate sooner date , please call to discuss    Thank you

## 2024-03-20 DIAGNOSIS — R19.7 DIARRHEA, UNSPECIFIED TYPE: ICD-10-CM

## 2024-03-20 DIAGNOSIS — K58.0 IRRITABLE BOWEL SYNDROME WITH DIARRHEA: ICD-10-CM

## 2024-03-20 DIAGNOSIS — R11.0 NAUSEA: ICD-10-CM

## 2024-03-20 DIAGNOSIS — E66.8 MODERATE OBESITY: ICD-10-CM

## 2024-03-20 DIAGNOSIS — K21.9 GASTROESOPHAGEAL REFLUX DISEASE, UNSPECIFIED WHETHER ESOPHAGITIS PRESENT: ICD-10-CM

## 2024-03-20 DIAGNOSIS — Z80.0 FAMILY HISTORY OF COLON CANCER: ICD-10-CM

## 2024-03-20 DIAGNOSIS — Z83.719 FAMILY HISTORY OF COLON POLYPS, UNSPECIFIED: ICD-10-CM

## 2024-04-05 ENCOUNTER — HOSPITAL ENCOUNTER (OUTPATIENT)
Dept: PHYSICAL THERAPY | Age: 44
Setting detail: THERAPIES SERIES
Discharge: HOME | End: 2024-04-05

## 2024-04-05 NOTE — FLOWSHEET NOTE
[] [facility]   Outpatient Rehabilitation & Therapy  [address]  P: [phone]   F: [phone]     Physical Therapy Cancel/No Show note    Date: 2024  Patient: Radhika Doty  : 1980  MRN: [id number]    Visit Count:   Cancels/No Shows to date: 8/3    For today's appointment patient:    [x]  Cancelled    [] Rescheduled appointment    [] No-show     Reason given by patient:    []  Patient ill    []  Conflicting appointment    [x] No transportation      [] Conflict with work    [] No reason given    [] Weather related    [] COVID-19    [] Other:      Comments:        [] Next appointment was confirmed    Electronically signed by: Concha Walker PT

## 2024-04-05 NOTE — DISCHARGE SUMMARY
activities with minimal dysfunction -NOT PROGRESSING 12/12  Pt will improve AROM in R knee to symmetrical values (0-120 deg flexion) in order to demonstrate ability to move/reach in all planes unrestricted at PLOF - PROGRESSING TOWARDS 12/12  Pt will demonstrate ability to negotiate 1 flight of stairs reciprocally with minimal handrail use to show improved functional mobility and efficiency at her PLOF -NOT PROGRESSING 12/12 (one step at a time and heavily relying on hand rail)  Pt will improve R tandem stance EO to 30 seconds to show improved proprioception/stability in a narrowed MAXIMO with less risk of injury to R knee. GOAL MET 9/28  LTG: (to be met in 20 treatments)  Pt will demonstrate improved R LE strength to 4+/5 or greater in order to demonstrate improved stability/strength necessary for unrestricted ADLs/work activities -PROGRESSING TOWARDS 12/12  Pt will improve R SLS to 10 seconds or greater to demonstrate improved symmetrical WB tolerance/proprioception through isolated stance phase activities for minimal risk of future re-injury -GOAL MET 12/12  Pt will improve LEFS to 70% function or greater in order to demonstrate improved functional tolerances at PLOF with minimal restriction/dysfunction -PROGRESSING TOWARDS 12/12  Pt will demonstrate independence with a long term HEP for continued progress/maintenance after completion of PT -GOAL MET 12/12  Pt will improve R SLS to 15 seconds or greater to demonstrate improved symmetrical WB tolerance/proprioception through isolated stance phase activities for minimal risk of future re-injury -GOAL ADDED 12/12    Treatment to Date:  [x] Therapeutic Exercise   26413             [] Iontophoresis: 4 mg/mL Dexamethasone Sodium Phosphate  mAmin  10489   [] Therapeutic Activity  92620 [] Vasopneumatic cold with compression  69791               [] Gait Training    33008 [] Ultrasound        65487   [] Neuromuscular Re-education  43585 [] Electrical Stimulation

## 2024-04-09 ENCOUNTER — TELEPHONE (OUTPATIENT)
Dept: INTERNAL MEDICINE CLINIC | Age: 44
End: 2024-04-09

## 2024-04-09 ENCOUNTER — HOSPITAL ENCOUNTER (OUTPATIENT)
Age: 44
Setting detail: SPECIMEN
Discharge: HOME | End: 2024-04-09

## 2024-04-09 ENCOUNTER — OFFICE VISIT (OUTPATIENT)
Dept: INTERNAL MEDICINE CLINIC | Age: 44
End: 2024-04-09
Payer: COMMERCIAL

## 2024-04-09 VITALS
HEIGHT: 62 IN | OXYGEN SATURATION: 97 % | HEART RATE: 84 BPM | DIASTOLIC BLOOD PRESSURE: 82 MMHG | SYSTOLIC BLOOD PRESSURE: 132 MMHG | BODY MASS INDEX: 46.38 KG/M2 | WEIGHT: 252 LBS

## 2024-04-09 DIAGNOSIS — R10.9 LEFT FLANK PAIN: ICD-10-CM

## 2024-04-09 DIAGNOSIS — Z13.1 DIABETES MELLITUS SCREENING: ICD-10-CM

## 2024-04-09 DIAGNOSIS — Z87.442 H/O RENAL CALCULI: ICD-10-CM

## 2024-04-09 DIAGNOSIS — J32.9 CHRONIC SINUSITIS, UNSPECIFIED LOCATION: Primary | ICD-10-CM

## 2024-04-09 LAB
ALBUMIN SERPL-MCNC: 4.6 G/DL (ref 3.5–5.2)
ALBUMIN/GLOB SERPL: 1 {RATIO} (ref 1–2.5)
ALP SERPL-CCNC: 67 U/L (ref 35–104)
ALT SERPL-CCNC: 14 U/L (ref 10–35)
ANION GAP SERPL CALCULATED.3IONS-SCNC: 13 MMOL/L (ref 9–16)
AST SERPL-CCNC: 21 U/L (ref 10–35)
BACTERIA URNS QL MICRO: ABNORMAL
BILIRUB SERPL-MCNC: 0.3 MG/DL (ref 0–1.2)
BILIRUB UR QL STRIP: NEGATIVE
BUN SERPL-MCNC: 14 MG/DL (ref 6–20)
CALCIUM SERPL-MCNC: 9.8 MG/DL (ref 8.6–10.4)
CASTS #/AREA URNS LPF: ABNORMAL /LPF (ref 0–8)
CHLORIDE SERPL-SCNC: 103 MMOL/L (ref 98–107)
CLARITY UR: ABNORMAL
CO2 SERPL-SCNC: 22 MMOL/L (ref 20–31)
COLOR UR: YELLOW
CREAT SERPL-MCNC: 0.6 MG/DL (ref 0.5–0.9)
EPI CELLS #/AREA URNS HPF: ABNORMAL /HPF (ref 0–5)
ERYTHROCYTE [DISTWIDTH] IN BLOOD BY AUTOMATED COUNT: 13.5 % (ref 11.8–14.4)
EST. AVERAGE GLUCOSE BLD GHB EST-MCNC: 105 MG/DL
GFR SERPL CREATININE-BSD FRML MDRD: >90 ML/MIN/1.73M2
GLUCOSE SERPL-MCNC: 85 MG/DL (ref 74–99)
GLUCOSE UR STRIP-MCNC: NEGATIVE MG/DL
HBA1C MFR BLD: 5.3 % (ref 4–6)
HCT VFR BLD AUTO: 41.3 % (ref 36.3–47.1)
HGB BLD-MCNC: 13 G/DL (ref 11.9–15.1)
HGB UR QL STRIP.AUTO: NEGATIVE
KETONES UR STRIP-MCNC: NEGATIVE MG/DL
LEUKOCYTE ESTERASE UR QL STRIP: ABNORMAL
MCH RBC QN AUTO: 28.8 PG (ref 25.2–33.5)
MCHC RBC AUTO-ENTMCNC: 31.5 G/DL (ref 28.4–34.8)
MCV RBC AUTO: 91.4 FL (ref 82.6–102.9)
NITRITE UR QL STRIP: NEGATIVE
NRBC BLD-RTO: 0 PER 100 WBC
PH UR STRIP: 6.5 [PH] (ref 5–8)
PLATELET # BLD AUTO: 453 K/UL (ref 138–453)
PMV BLD AUTO: 9.9 FL (ref 8.1–13.5)
POTASSIUM SERPL-SCNC: 4.6 MMOL/L (ref 3.7–5.3)
PROT SERPL-MCNC: 8 G/DL (ref 6.6–8.7)
PROT UR STRIP-MCNC: ABNORMAL MG/DL
RBC # BLD AUTO: 4.52 M/UL (ref 3.95–5.11)
RBC #/AREA URNS HPF: ABNORMAL /HPF (ref 0–4)
SODIUM SERPL-SCNC: 138 MMOL/L (ref 136–145)
SP GR UR STRIP: 1.03 (ref 1–1.03)
UROBILINOGEN UR STRIP-ACNC: NORMAL EU/DL (ref 0–1)
WBC #/AREA URNS HPF: ABNORMAL /HPF (ref 0–5)
WBC OTHER # BLD: 8.8 K/UL (ref 3.5–11.3)

## 2024-04-09 PROCEDURE — G8427 DOCREV CUR MEDS BY ELIG CLIN: HCPCS | Performed by: INTERNAL MEDICINE

## 2024-04-09 PROCEDURE — 1036F TOBACCO NON-USER: CPT | Performed by: INTERNAL MEDICINE

## 2024-04-09 PROCEDURE — 99214 OFFICE O/P EST MOD 30 MIN: CPT | Performed by: INTERNAL MEDICINE

## 2024-04-09 PROCEDURE — G8417 CALC BMI ABV UP PARAM F/U: HCPCS | Performed by: INTERNAL MEDICINE

## 2024-04-09 RX ORDER — FEXOFENADINE HCL 180 MG/1
180 TABLET ORAL DAILY
Qty: 30 TABLET | Refills: 0 | Status: SHIPPED | OUTPATIENT
Start: 2024-04-09 | End: 2024-05-09

## 2024-04-09 RX ORDER — LORATADINE 10 MG/1
10 TABLET ORAL DAILY
Qty: 30 TABLET | Refills: 0 | Status: SHIPPED | OUTPATIENT
Start: 2024-04-09 | End: 2024-05-09

## 2024-04-09 SDOH — ECONOMIC STABILITY: FOOD INSECURITY: WITHIN THE PAST 12 MONTHS, THE FOOD YOU BOUGHT JUST DIDN'T LAST AND YOU DIDN'T HAVE MONEY TO GET MORE.: NEVER TRUE

## 2024-04-09 SDOH — ECONOMIC STABILITY: INCOME INSECURITY: HOW HARD IS IT FOR YOU TO PAY FOR THE VERY BASICS LIKE FOOD, HOUSING, MEDICAL CARE, AND HEATING?: NOT HARD AT ALL

## 2024-04-09 SDOH — ECONOMIC STABILITY: FOOD INSECURITY: WITHIN THE PAST 12 MONTHS, YOU WORRIED THAT YOUR FOOD WOULD RUN OUT BEFORE YOU GOT MONEY TO BUY MORE.: NEVER TRUE

## 2024-04-09 NOTE — PATIENT INSTRUCTIONS
Advised 30 mins cardiovascular exercises every day.  DASH diet 1500 to 2000 mg of Sodium a day/less than 1 tsp salt for day.  5 serving vegetables a day, 3 to 4 serving fruit a day, low fat dairy products.  Whole grain food. 20 g fiber a day  Recommended no refined sugar, low refined starch, healthy oil intake (olive oil), healthy protein (fish) along the lines of the Mediterranean diet

## 2024-04-09 NOTE — PROGRESS NOTES
Financial Resource Strain (CARDIA)     Difficulty of Paying Living Expenses: Not hard at all   Food Insecurity: No Food Insecurity (4/9/2024)    Hunger Vital Sign     Worried About Running Out of Food in the Last Year: Never true     Ran Out of Food in the Last Year: Never true   Transportation Needs: Unknown (4/9/2024)    PRAPARE - Transportation     Lack of Transportation (Medical): Not on file     Lack of Transportation (Non-Medical): No   Physical Activity: Not on file   Stress: Not on file   Social Connections: Not on file   Intimate Partner Violence: Not on file   Housing Stability: Unknown (4/9/2024)    Housing Stability Vital Sign     Unable to Pay for Housing in the Last Year: Not on file     Number of Places Lived in the Last Year: Not on file     Unstable Housing in the Last Year: No           CURRENT MEDICATIONS:  Current Outpatient Medications   Medication Sig Dispense Refill    omeprazole (PRILOSEC) 20 MG delayed release capsule Take 1 capsule by mouth 2 times daily (before meals) 180 capsule 1    albuterol sulfate HFA (PROVENTIL;VENTOLIN;PROAIR) 108 (90 Base) MCG/ACT inhaler Inhale 2 puffs into the lungs every 4 hours as needed      albuterol (PROVENTIL) (2.5 MG/3ML) 0.083% nebulizer solution Inhale 1 VIAL by nebulization every 6 hours as needed for wheezing.      acetaminophen (TYLENOL) 325 MG tablet Take 2 tablets by mouth every 6 hours as needed for Pain      celecoxib (CELEBREX) 200 MG capsule Take 1 capsule by mouth daily 60 capsule 3    fluticasone (FLONASE) 50 MCG/ACT nasal spray 1 spray by Each Nostril route daily 32 g 1    VENTOLIN  (90 Base) MCG/ACT inhaler INHALE 2 PUFFS BY MOUTH EVERY 4 (FOUR) HOURS AS NEEDED FOR SHORTNESS OF BREATH (Patient not taking: Reported on 4/9/2024)      polyethylene glycol (GLYCOLAX) 17 GM/SCOOP powder Please follow instructions provided to you by your provider. (Patient not taking: Reported on 4/9/2024) 238 g 0    bisacodyl 5 MG EC tablet Please follow

## 2024-04-09 NOTE — TELEPHONE ENCOUNTER
Patients PA for Allegra was denied and stated patient had to try 30 days worth of either: cetirizine syrup and tablets, loratadine syrup and tablets, and loratadine/pseudoephedrine (combination product).

## 2024-04-10 ENCOUNTER — TELEPHONE (OUTPATIENT)
Dept: INTERNAL MEDICINE CLINIC | Age: 44
End: 2024-04-10

## 2024-04-10 LAB
MICROORGANISM SPEC CULT: ABNORMAL
SPECIMEN DESCRIPTION: ABNORMAL

## 2024-04-10 RX ORDER — CEPHALEXIN 500 MG/1
500 CAPSULE ORAL 2 TIMES DAILY
Qty: 14 CAPSULE | Refills: 0 | Status: SHIPPED | OUTPATIENT
Start: 2024-04-10 | End: 2024-04-17

## 2024-04-10 RX ORDER — FLUCONAZOLE 150 MG/1
150 TABLET ORAL
Qty: 2 TABLET | Refills: 0 | Status: SHIPPED | OUTPATIENT
Start: 2024-04-10 | End: 2024-04-16

## 2024-04-10 NOTE — TELEPHONE ENCOUNTER
Patient wants to know if the doctor will send in diflucan to the pharmacy because she states when she takes antibiotics that it usually always causes a yeast infection.     [facility]

## 2024-04-24 ENCOUNTER — TELEPHONE (OUTPATIENT)
Dept: INTERNAL MEDICINE CLINIC | Age: 44
End: 2024-04-24

## 2024-04-24 NOTE — TELEPHONE ENCOUNTER
Patient was seen 4/9/24 and given antibiotics for a UTI. She states that it has gotten worse and she is finished with the antibiotics.    She states she has not gotten the CT done that was ordered but will call today.    She also states she thinks she might have passed a kidney stone while urinating Saturday.     She does have an upcoming appt 5/7/2024 nothing available sooner.    Please advise    [facility]

## 2024-04-24 NOTE — TELEPHONE ENCOUNTER
Will need to wait for CT results before I can make further recommendations.  Patient can go to ER in the meantime if symptoms continue to get worse

## 2024-05-07 ENCOUNTER — OFFICE VISIT (OUTPATIENT)
Dept: INTERNAL MEDICINE CLINIC | Age: 44
End: 2024-05-07
Payer: COMMERCIAL

## 2024-05-07 ENCOUNTER — OFFICE VISIT (OUTPATIENT)
Dept: GASTROENTEROLOGY | Age: 44
End: 2024-05-07
Payer: COMMERCIAL

## 2024-05-07 VITALS
SYSTOLIC BLOOD PRESSURE: 128 MMHG | HEIGHT: 62 IN | BODY MASS INDEX: 46.38 KG/M2 | OXYGEN SATURATION: 99 % | WEIGHT: 252 LBS | HEART RATE: 94 BPM | DIASTOLIC BLOOD PRESSURE: 78 MMHG

## 2024-05-07 VITALS
WEIGHT: 253 LBS | TEMPERATURE: 97.8 F | SYSTOLIC BLOOD PRESSURE: 125 MMHG | DIASTOLIC BLOOD PRESSURE: 80 MMHG | BODY MASS INDEX: 46.27 KG/M2

## 2024-05-07 DIAGNOSIS — M54.50 LOW BACK PAIN WITHOUT SCIATICA, UNSPECIFIED BACK PAIN LATERALITY, UNSPECIFIED CHRONICITY: ICD-10-CM

## 2024-05-07 DIAGNOSIS — K58.0 IRRITABLE BOWEL SYNDROME WITH DIARRHEA: ICD-10-CM

## 2024-05-07 DIAGNOSIS — G24.5 EYE TWITCH: Primary | ICD-10-CM

## 2024-05-07 DIAGNOSIS — Z80.0 FAMILY HISTORY OF COLON CANCER: ICD-10-CM

## 2024-05-07 DIAGNOSIS — R11.0 NAUSEA: ICD-10-CM

## 2024-05-07 DIAGNOSIS — Z83.719 FAMILY HISTORY OF COLON POLYPS, UNSPECIFIED: ICD-10-CM

## 2024-05-07 DIAGNOSIS — E66.8 MODERATE OBESITY: ICD-10-CM

## 2024-05-07 DIAGNOSIS — K21.9 GASTROESOPHAGEAL REFLUX DISEASE, UNSPECIFIED WHETHER ESOPHAGITIS PRESENT: ICD-10-CM

## 2024-05-07 DIAGNOSIS — E66.01 SEVERE OBESITY (BMI >= 40) (HCC): ICD-10-CM

## 2024-05-07 DIAGNOSIS — K27.9 PUD (PEPTIC ULCER DISEASE): Primary | ICD-10-CM

## 2024-05-07 PROCEDURE — 1036F TOBACCO NON-USER: CPT | Performed by: INTERNAL MEDICINE

## 2024-05-07 PROCEDURE — G8427 DOCREV CUR MEDS BY ELIG CLIN: HCPCS | Performed by: INTERNAL MEDICINE

## 2024-05-07 PROCEDURE — 99214 OFFICE O/P EST MOD 30 MIN: CPT | Performed by: INTERNAL MEDICINE

## 2024-05-07 PROCEDURE — G8417 CALC BMI ABV UP PARAM F/U: HCPCS | Performed by: INTERNAL MEDICINE

## 2024-05-07 RX ORDER — OLOPATADINE HYDROCHLORIDE 1 MG/ML
1 SOLUTION/ DROPS OPHTHALMIC 2 TIMES DAILY
Qty: 5 ML | Refills: 0 | Status: SHIPPED | OUTPATIENT
Start: 2024-05-07 | End: 2024-06-06

## 2024-05-07 RX ORDER — ORAL SEMAGLUTIDE 3 MG/1
1 TABLET ORAL
Qty: 30 TABLET | Refills: 2 | Status: SHIPPED | OUTPATIENT
Start: 2024-05-07

## 2024-05-07 RX ORDER — CYCLOBENZAPRINE HCL 10 MG
10 TABLET ORAL 2 TIMES DAILY PRN
COMMUNITY
Start: 2024-04-25 | End: 2024-05-07 | Stop reason: SDUPTHER

## 2024-05-07 RX ORDER — BACILLUS COAGULANS 1B CELL
1 CAPSULE ORAL ONCE
Qty: 90 EACH | Refills: 2 | Status: SHIPPED | OUTPATIENT
Start: 2024-05-07 | End: 2024-05-07

## 2024-05-07 RX ORDER — CYCLOBENZAPRINE HCL 10 MG
10 TABLET ORAL 2 TIMES DAILY PRN
Qty: 30 TABLET | Refills: 0 | Status: SHIPPED | OUTPATIENT
Start: 2024-05-07

## 2024-05-07 ASSESSMENT — ENCOUNTER SYMPTOMS
CHOKING: 0
NAUSEA: 0
VOICE CHANGE: 0
WHEEZING: 0
BLOOD IN STOOL: 0
SORE THROAT: 0
VOMITING: 0
ABDOMINAL DISTENTION: 0
TROUBLE SWALLOWING: 0
SHORTNESS OF BREATH: 0
CONSTIPATION: 1
ANAL BLEEDING: 0
ABDOMINAL PAIN: 1
DIARRHEA: 1
RECTAL PAIN: 0
COUGH: 0

## 2024-05-07 NOTE — PROGRESS NOTES
(CARDIA)     Difficulty of Paying Living Expenses: Not hard at all   Food Insecurity: No Food Insecurity (4/9/2024)    Hunger Vital Sign     Worried About Running Out of Food in the Last Year: Never true     Ran Out of Food in the Last Year: Never true   Transportation Needs: Unknown (4/9/2024)    PRAPARE - Transportation     Lack of Transportation (Medical): Not on file     Lack of Transportation (Non-Medical): No   Physical Activity: Not on file   Stress: Not on file   Social Connections: Not on file   Intimate Partner Violence: Not on file   Housing Stability: Unknown (4/9/2024)    Housing Stability Vital Sign     Unable to Pay for Housing in the Last Year: Not on file     Number of Places Lived in the Last Year: Not on file     Unstable Housing in the Last Year: No           CURRENT MEDICATIONS:  Current Outpatient Medications   Medication Sig Dispense Refill    cyclobenzaprine (FLEXERIL) 10 MG tablet Take 1 tablet by mouth 2 times daily as needed      fexofenadine (ALLEGRA) 180 MG tablet Take 1 tablet by mouth daily 30 tablet 0    loratadine (CLARITIN) 10 MG tablet Take 1 tablet by mouth daily 30 tablet 0    omeprazole (PRILOSEC) 20 MG delayed release capsule Take 1 capsule by mouth 2 times daily (before meals) 180 capsule 1    albuterol sulfate HFA (PROVENTIL;VENTOLIN;PROAIR) 108 (90 Base) MCG/ACT inhaler Inhale 2 puffs into the lungs every 4 hours as needed      albuterol (PROVENTIL) (2.5 MG/3ML) 0.083% nebulizer solution Inhale 1 VIAL by nebulization every 6 hours as needed for wheezing.      acetaminophen (TYLENOL) 325 MG tablet Take 2 tablets by mouth every 6 hours as needed for Pain      celecoxib (CELEBREX) 200 MG capsule Take 1 capsule by mouth daily 60 capsule 3    fluticasone (FLONASE) 50 MCG/ACT nasal spray 1 spray by Each Nostril route daily 32 g 1    VENTOLIN  (90 Base) MCG/ACT inhaler INHALE 2 PUFFS BY MOUTH EVERY 4 (FOUR) HOURS AS NEEDED FOR SHORTNESS OF BREATH (Patient not taking:

## 2024-05-07 NOTE — PROGRESS NOTES
GI CLINIC FOLLOW UP    NTERVAL HISTORY:   No referring provider defined for this encounter.    Chief Complaint   Patient presents with    Results     Pt is here today for a f/u from colonoscopy/egd procedure completed on 3/5/24.       1. PUD (peptic ulcer disease)    2. Family history of colon cancer    3. Family history of colon polyps, unspecified    4. Nausea    5. Moderate obesity    6. Gastroesophageal reflux disease, unspecified whether esophagitis present    7. Irritable bowel syndrome with diarrhea       The patient is here as a follow up of her recent GI procedure.   The results have been sent to you separately   The findings were explained to the patient in detail and biopsies were also discussed   with her    This patient had recent EGD and colonoscopy by myself  She was found to have a small ulcer in the antrum probably related to NSAID H. pylori was negative  Colonoscopy was grossly normal internal hemorrhoids or skin tags were noted    Mostly irritable bowel syndrome like symptoms    She is taking PPI and feeling better    No rectal bleeding no melanotic stool    She has moderate obesity    Trying to lose some weight trying to eat healthier food    No smoking alcohol abuse illicit drug usage    Some abdominal bloating gas irritable bowel syndrome like symptoms      Previous records were reviewed with her      HISTORY OF PRESENT ILLNESS: Ms.Jennay Doty is a 43 y.o. female with a past history remarkable for , referred for evaluation of   Chief Complaint   Patient presents with    Results     Pt is here today for a f/u from colonoscopy/egd procedure completed on 3/5/24.   .    Past Medical,Family, and Social History reviewed and does contribute to the patient presenting condition.    Patient's PMH/PSH,SH,PSYCH Hx, MEDs, ALLERGIES, and ROS were all reviewed and updated in the appropriate sections.    PAST MEDICAL HISTORY:  Past Medical History:   Diagnosis Date    Arthritis     arthro both

## 2024-05-08 RX ORDER — LORATADINE 10 MG/1
10 TABLET ORAL DAILY
Qty: 30 TABLET | Refills: 0 | Status: SHIPPED | OUTPATIENT
Start: 2024-05-08

## 2024-05-09 ENCOUNTER — TELEPHONE (OUTPATIENT)
Dept: INTERNAL MEDICINE CLINIC | Age: 44
End: 2024-05-09

## 2024-05-09 NOTE — TELEPHONE ENCOUNTER
Patient inquiring if our office received fax for prior authorization  on the Rybelsus , please advise

## 2024-05-09 NOTE — TELEPHONE ENCOUNTER
Attempted to contact pt to inform medication is denied, lvm to call office back  Will need to call insurance to find out what else will be covered.

## 2024-05-10 NOTE — TELEPHONE ENCOUNTER
Patient called back, writer informed her of the previous message. Patient expressed understanding and stated that she will contact her insurance company for an alternative medication.

## 2024-05-20 ENCOUNTER — OFFICE VISIT (OUTPATIENT)
Dept: ORTHOPEDIC SURGERY | Age: 44
End: 2024-05-20
Payer: COMMERCIAL

## 2024-05-20 VITALS — BODY MASS INDEX: 46.38 KG/M2 | HEIGHT: 62 IN | WEIGHT: 252 LBS

## 2024-05-20 DIAGNOSIS — M17.0 BILATERAL PRIMARY OSTEOARTHRITIS OF KNEE: Primary | ICD-10-CM

## 2024-05-20 PROCEDURE — 1036F TOBACCO NON-USER: CPT | Performed by: PHYSICIAN ASSISTANT

## 2024-05-20 PROCEDURE — G8427 DOCREV CUR MEDS BY ELIG CLIN: HCPCS | Performed by: PHYSICIAN ASSISTANT

## 2024-05-20 PROCEDURE — G8417 CALC BMI ABV UP PARAM F/U: HCPCS | Performed by: PHYSICIAN ASSISTANT

## 2024-05-20 PROCEDURE — 20610 DRAIN/INJ JOINT/BURSA W/O US: CPT | Performed by: PHYSICIAN ASSISTANT

## 2024-05-20 PROCEDURE — 99212 OFFICE O/P EST SF 10 MIN: CPT | Performed by: PHYSICIAN ASSISTANT

## 2024-05-20 RX ORDER — BUPIVACAINE HYDROCHLORIDE 5 MG/ML
2 INJECTION, SOLUTION PERINEURAL ONCE
Status: COMPLETED | OUTPATIENT
Start: 2024-05-20 | End: 2024-05-20

## 2024-05-20 RX ORDER — BETAMETHASONE SODIUM PHOSPHATE AND BETAMETHASONE ACETATE 3; 3 MG/ML; MG/ML
12 INJECTION, SUSPENSION INTRA-ARTICULAR; INTRALESIONAL; INTRAMUSCULAR; SOFT TISSUE ONCE
Status: COMPLETED | OUTPATIENT
Start: 2024-05-20 | End: 2024-05-20

## 2024-05-20 RX ORDER — LIDOCAINE HYDROCHLORIDE 10 MG/ML
2 INJECTION, SOLUTION INFILTRATION; PERINEURAL ONCE
Status: COMPLETED | OUTPATIENT
Start: 2024-05-20 | End: 2024-05-20

## 2024-05-20 RX ADMIN — BETAMETHASONE SODIUM PHOSPHATE AND BETAMETHASONE ACETATE 12 MG: 3; 3 INJECTION, SUSPENSION INTRA-ARTICULAR; INTRALESIONAL; INTRAMUSCULAR; SOFT TISSUE at 14:43

## 2024-05-20 RX ADMIN — BUPIVACAINE HYDROCHLORIDE 10 MG: 5 INJECTION, SOLUTION PERINEURAL at 14:44

## 2024-05-20 RX ADMIN — BETAMETHASONE SODIUM PHOSPHATE AND BETAMETHASONE ACETATE 12 MG: 3; 3 INJECTION, SUSPENSION INTRA-ARTICULAR; INTRALESIONAL; INTRAMUSCULAR; SOFT TISSUE at 14:42

## 2024-05-20 RX ADMIN — LIDOCAINE HYDROCHLORIDE 2 ML: 10 INJECTION, SOLUTION INFILTRATION; PERINEURAL at 14:44

## 2024-05-20 RX ADMIN — LIDOCAINE HYDROCHLORIDE 2 ML: 10 INJECTION, SOLUTION INFILTRATION; PERINEURAL at 14:45

## 2024-05-20 NOTE — PROGRESS NOTES
[facility]                Mir Sahni PA-C            [address], [address]               [place], [place] [address]           Dept Phone: [phone]           Dept Fax:  [phone] [address]                       [address]           [place], [place] [address]          Dept Phone: [phone]           Dept Fax:  [phone]      Chief Compliant:  Chief Complaint   Patient presents with    Knee Pain     bilateral        History of Present Illness:  This is a 43 y.o. female who presents to the clinic today for evaluation of had concerns including Knee Pain (bilateral).     Ms. Doty is a 43-year-old female returns today for reevaluation of chronic bilateral knee pain patient with known moderate osteoarthritis of both knees x-ray underwent a right knee arthroscopy partial medial meniscectomy in July 2023.  Patient has undergone right multiple rounds of corticosteroid injections as well as physical therapy.  Her last injections on 1/10/2024 did provide moderate relief of pain in both knees stating that her pain really just started over the last couple weeks.  She is also taking Celebrex 200 mg daily which does seem to help with her pain.    Patient returns today for reevaluation hoping undergo repeat injections.  Of note we did try to submit for Durolane injections in the past but these were denied in 2023.  Since then she has undergone 2 more rounds of corticosteroids as well as physical therapy for both knees.       Past History:    Current Outpatient Medications:     loratadine (CLARITIN) 10 MG tablet, TAKE 1 TABLET BY MOUTH EVERY DAY, Disp: 30 tablet, Rfl: 0    cyclobenzaprine (FLEXERIL) 10 MG tablet, Take 1 tablet by mouth 2 times daily as needed for Muscle spasms, Disp: 30 tablet, Rfl: 0    olopatadine (PATANOL) 0.1 % ophthalmic solution, Place 1 drop into the left eye 2 times daily, Disp: 5 mL, Rfl: 0    Semaglutide (RYBELSUS) 3 MG TABS, Take

## 2024-05-25 DIAGNOSIS — M54.50 LOW BACK PAIN WITHOUT SCIATICA, UNSPECIFIED BACK PAIN LATERALITY, UNSPECIFIED CHRONICITY: ICD-10-CM

## 2024-05-28 RX ORDER — CYCLOBENZAPRINE HCL 10 MG
TABLET ORAL
Qty: 30 TABLET | Refills: 0 | Status: SHIPPED | OUTPATIENT
Start: 2024-05-28

## 2024-05-30 ENCOUNTER — OFFICE VISIT (OUTPATIENT)
Dept: ORTHOPEDIC SURGERY | Age: 44
End: 2024-05-30
Payer: COMMERCIAL

## 2024-05-30 VITALS — BODY MASS INDEX: 46.38 KG/M2 | HEIGHT: 62 IN | WEIGHT: 252 LBS

## 2024-05-30 DIAGNOSIS — M54.9 BACK PAIN, UNSPECIFIED BACK LOCATION, UNSPECIFIED BACK PAIN LATERALITY, UNSPECIFIED CHRONICITY: ICD-10-CM

## 2024-05-30 DIAGNOSIS — M54.6 ACUTE BILATERAL THORACIC BACK PAIN: ICD-10-CM

## 2024-05-30 DIAGNOSIS — M54.50 LUMBAR BACK PAIN: Primary | ICD-10-CM

## 2024-05-30 PROCEDURE — G8417 CALC BMI ABV UP PARAM F/U: HCPCS | Performed by: PHYSICIAN ASSISTANT

## 2024-05-30 PROCEDURE — 1036F TOBACCO NON-USER: CPT | Performed by: PHYSICIAN ASSISTANT

## 2024-05-30 PROCEDURE — 99213 OFFICE O/P EST LOW 20 MIN: CPT | Performed by: PHYSICIAN ASSISTANT

## 2024-05-30 PROCEDURE — G8427 DOCREV CUR MEDS BY ELIG CLIN: HCPCS | Performed by: PHYSICIAN ASSISTANT

## 2024-05-31 NOTE — PROGRESS NOTES
Head: Normocephalic and atraumatic.      Nose: Nose normal.   Eyes:      Conjunctiva/sclera: Conjunctivae normal.   Neck:      Musculoskeletal: Normal range of motion and neck supple.          See below for further cervical spine examination  Pulmonary:      Effort: Pulmonary effort is normal. No respiratory distress.   Neurological:      Mental Status: Alert and oriented to person, place, and time.      Sensory: No sensory deficit.   Psychiatric:         Behavior: Behavior normal.         Thought Content: Thought content normal.  Skin:     General: Skin is warm and dry. No rashes, ulcerations or lesions.  Musculoskeletal:      Comments: Normal gait. Patient ambulates without assistance  Examination of the spine:  Inspection: Cervical, thoracic, and lumbar spine show appropriate curvature and symmetry. No evidence of deformity, malalignment, asymmetry, ecchymosis, erythema, lesions, or spasms. Local inspection shows no step-off. Sagittal and Coronal balance is neutral.     Palpation:  Tenderness to lumbar paraspinal musculature buttock bilaterally.  As well as over the bilateral greater trochanters. There is no step-off or paraspinal spasm. Range of Motion: Lumbar flexion, extension and rotation are intact. Strength:  Strength testing is 5/5 to lumbar spine. BLE strength testing is 5/5 in all muscle groups tested. Special Tests:   Straight leg raise and crossed SLR negative.   Reflexes: Reflexes are symmetrically 2+ at the patellar and ankle tendons.  Clonus absent bilaterally at the feet.    Bilateral Lower Extremities: Inspection/examination of the bilateral lower extremity does not show any evidence of atrophy, malalignment, ecchymosis, erythema, scarring, effusions, tenderness, deformity, or injury. Range of motion is intact bilaterally. There is no gross instability.  Strength and tone are normal.        Diagnostic imaging:     Orders Placed This Encounter   Procedures    XR LUMBAR SPINE (2-3 VIEWS)

## 2024-06-03 DIAGNOSIS — M17.0 BILATERAL PRIMARY OSTEOARTHRITIS OF KNEE: ICD-10-CM

## 2024-06-03 DIAGNOSIS — Z98.890 S/P RIGHT KNEE ARTHROSCOPY: ICD-10-CM

## 2024-06-03 RX ORDER — CELECOXIB 200 MG/1
CAPSULE ORAL DAILY
Qty: 60 CAPSULE | Refills: 0 | Status: SHIPPED | OUTPATIENT
Start: 2024-06-03 | End: 2024-06-04 | Stop reason: SDUPTHER

## 2024-06-03 RX ORDER — LORATADINE 10 MG/1
10 TABLET ORAL DAILY
Qty: 30 TABLET | Refills: 0 | Status: SHIPPED | OUTPATIENT
Start: 2024-06-03 | End: 2024-06-04 | Stop reason: SDUPTHER

## 2024-06-04 ENCOUNTER — HOSPITAL ENCOUNTER (OUTPATIENT)
Age: 44
Setting detail: SPECIMEN
Discharge: HOME | End: 2024-06-04

## 2024-06-04 ENCOUNTER — OFFICE VISIT (OUTPATIENT)
Dept: INTERNAL MEDICINE CLINIC | Age: 44
End: 2024-06-04
Payer: COMMERCIAL

## 2024-06-04 VITALS
OXYGEN SATURATION: 98 % | HEIGHT: 62 IN | SYSTOLIC BLOOD PRESSURE: 128 MMHG | HEART RATE: 76 BPM | BODY MASS INDEX: 45.82 KG/M2 | DIASTOLIC BLOOD PRESSURE: 76 MMHG | WEIGHT: 249 LBS

## 2024-06-04 DIAGNOSIS — G89.29 CHRONIC PAIN OF RIGHT KNEE: ICD-10-CM

## 2024-06-04 DIAGNOSIS — M17.0 BILATERAL PRIMARY OSTEOARTHRITIS OF KNEE: ICD-10-CM

## 2024-06-04 DIAGNOSIS — M25.561 CHRONIC PAIN OF RIGHT KNEE: ICD-10-CM

## 2024-06-04 DIAGNOSIS — K58.0 IRRITABLE BOWEL SYNDROME WITH DIARRHEA: ICD-10-CM

## 2024-06-04 DIAGNOSIS — E66.01 SEVERE OBESITY (BMI >= 40) (HCC): ICD-10-CM

## 2024-06-04 DIAGNOSIS — E66.01 SEVERE OBESITY (BMI >= 40) (HCC): Primary | ICD-10-CM

## 2024-06-04 DIAGNOSIS — Z98.890 S/P RIGHT KNEE ARTHROSCOPY: ICD-10-CM

## 2024-06-04 DIAGNOSIS — K27.9 PUD (PEPTIC ULCER DISEASE): ICD-10-CM

## 2024-06-04 LAB — TSH SERPL DL<=0.05 MIU/L-ACNC: 2.14 UIU/ML (ref 0.27–4.2)

## 2024-06-04 PROCEDURE — 99214 OFFICE O/P EST MOD 30 MIN: CPT | Performed by: INTERNAL MEDICINE

## 2024-06-04 PROCEDURE — G8427 DOCREV CUR MEDS BY ELIG CLIN: HCPCS | Performed by: INTERNAL MEDICINE

## 2024-06-04 PROCEDURE — G8417 CALC BMI ABV UP PARAM F/U: HCPCS | Performed by: INTERNAL MEDICINE

## 2024-06-04 PROCEDURE — 1036F TOBACCO NON-USER: CPT | Performed by: INTERNAL MEDICINE

## 2024-06-04 RX ORDER — CELECOXIB 200 MG/1
200 CAPSULE ORAL DAILY
Qty: 30 CAPSULE | Refills: 0 | Status: SHIPPED | OUTPATIENT
Start: 2024-06-04

## 2024-06-04 RX ORDER — LORATADINE 10 MG/1
10 TABLET ORAL DAILY
Qty: 30 TABLET | Refills: 0 | Status: SHIPPED | OUTPATIENT
Start: 2024-06-04

## 2024-06-04 RX ORDER — GABAPENTIN 100 MG/1
100 CAPSULE ORAL 3 TIMES DAILY
Qty: 90 CAPSULE | Refills: 1 | Status: SHIPPED | OUTPATIENT
Start: 2024-06-04 | End: 2024-08-03

## 2024-06-04 RX ORDER — BUPROPION HYDROCHLORIDE 150 MG/1
150 TABLET ORAL EVERY MORNING
Qty: 30 TABLET | Refills: 3 | Status: SHIPPED | OUTPATIENT
Start: 2024-06-04

## 2024-06-04 NOTE — PROGRESS NOTES
SUBJECTIVE:  Radhika Doty is a 43 y.o. female patient who  comes for complaints of   Chief Complaint   Patient presents with    Weight Gain     Unable to get weight loss medication would like to have hormone levels checked     Wt management    Wt Readings from Last 3 Encounters:   06/04/24 112.9 kg (249 lb)   05/30/24 114.3 kg (252 lb)   05/20/24 114.3 kg (252 lb)       Hemoglobin A1C   Date Value Ref Range Status   04/09/2024 5.3 4.0 - 6.0 % Final       Reports some anxiety/depression  Try wellbutrin    Needs celebrex for arthritis  Has h/o PUD  C/w tylenol  Recommend stop  Wants to take for antoehr couple weeks- ok will order for another few wk  Try neurontin      REVIEW OF SYSTEMS (except Subjective (HPI))  GENERAL: No fevers / chills  RESPIRATORY: Negative for cough, wheezing or shortness of breath  CARDIOVASCULAR: Negative for chest pain or palpitations.  GI: no nausea, vomiting, or diarrhea  NEURO: No history of headaches    Past Medical History:   Diagnosis Date    Arthritis     arthro both knees    Asthma     as a child & exercise induced    Borderline diabetes     COVID-19 02/2023    Edema     Hyperlipidemia     PONV (postoperative nausea and vomiting)     itchy,vomit       SOCIAL HISTORY:  Social History     Socioeconomic History    Marital status: Single     Spouse name: Not on file    Number of children: Not on file    Years of education: Not on file    Highest education level: Not on file   Occupational History    Not on file   Tobacco Use    Smoking status: Never    Smokeless tobacco: Never   Vaping Use    Vaping Use: Never used   Substance and Sexual Activity    Alcohol use: Not Currently    Drug use: Never    Sexual activity: Not on file   Other Topics Concern    Not on file   Social History Narrative    Not on file     Social Determinants of Health     Financial Resource Strain: Low Risk  (4/9/2024)    Overall Financial Resource Strain (CARDIA)     Difficulty of Paying Living Expenses: Not hard at

## 2024-06-22 DIAGNOSIS — M54.50 LOW BACK PAIN WITHOUT SCIATICA, UNSPECIFIED BACK PAIN LATERALITY, UNSPECIFIED CHRONICITY: ICD-10-CM

## 2024-06-25 RX ORDER — CYCLOBENZAPRINE HCL 10 MG
TABLET ORAL
Qty: 30 TABLET | Refills: 0 | Status: SHIPPED | OUTPATIENT
Start: 2024-06-25

## 2024-06-26 ENCOUNTER — TELEPHONE (OUTPATIENT)
Dept: ADMINISTRATIVE | Age: 44
End: 2024-06-26

## 2024-06-26 NOTE — TELEPHONE ENCOUNTER
Patient requested for Gel injection for both needs, last shot cortisone 5/20/24 is not working still have pain in both knees     Please call patient at [phone]

## 2024-06-28 NOTE — TELEPHONE ENCOUNTER
Pt called in to check to see if we had started the PA for her gel injection shots. States that during her appt on 05/20/24 Aniket has mentioned he was going to start the process then.    Please call pt once we get update from insurance @ [phone]

## 2024-06-28 NOTE — TELEPHONE ENCOUNTER
LVM for Radhika, prior auth has been started, awating for Mir Sahni's signature. scanned into chart 6/28/24

## 2024-07-01 ENCOUNTER — TELEPHONE (OUTPATIENT)
Dept: ORTHOPEDIC SURGERY | Age: 44
End: 2024-07-01

## 2024-07-01 NOTE — TELEPHONE ENCOUNTER
A call came in from Peecorrina Naqvi who asked to speak with Amarjit GOMEZ in Orthopedics regarding a FAX they received for Radhika Doty. Writer explained a message would be sent to her as they were in clinic presently. Donya would appreciate a call back from Amarjit at [phone] and then follow the prompts to pharmacy.  More information is needed on Radhika's PT dates along with the triggers for her knee pain (such as stair clinbing, etc) Writer was able to clarify the date patient saw Mir VERDIN in office on 5/20/24.

## 2024-07-02 ENCOUNTER — TELEPHONE (OUTPATIENT)
Dept: ORTHOPEDIC SURGERY | Age: 44
End: 2024-07-02

## 2024-07-02 NOTE — TELEPHONE ENCOUNTER
Called care source requesting to speak with Donya in Pharmacy. Ibeth DE LA VEGA#[id number] prior auth denied: [id number] shared there is no Donya available in the pharmacy office. The then transferred me to Joleen DE LA VEGA#[id number] stated   Radhika's care source was re activated 2/1/23.requested PT papers. I faxed over PT paperwork notes dating from:11/9/23- 2/20/24. Attempting to get an approval for Visco Gel injections.  At this time we are awaiting for a return fax back.

## 2024-07-02 NOTE — TELEPHONE ENCOUNTER
mailbox full. called to schedule Radhika with Mir Sahni 8.1.24   if she calls back please schedule her with Mir Sahni

## 2024-07-08 DIAGNOSIS — M17.0 BILATERAL PRIMARY OSTEOARTHRITIS OF KNEE: ICD-10-CM

## 2024-07-08 DIAGNOSIS — G89.29 CHRONIC PAIN OF RIGHT KNEE: ICD-10-CM

## 2024-07-08 DIAGNOSIS — M25.561 CHRONIC PAIN OF RIGHT KNEE: ICD-10-CM

## 2024-07-08 DIAGNOSIS — Z98.890 S/P RIGHT KNEE ARTHROSCOPY: ICD-10-CM

## 2024-07-08 RX ORDER — CELECOXIB 200 MG/1
200 CAPSULE ORAL DAILY
Qty: 30 CAPSULE | Refills: 0 | Status: SHIPPED | OUTPATIENT
Start: 2024-07-08

## 2024-07-08 NOTE — TELEPHONE ENCOUNTER
LRF 6/4/24 #30      Associated Diagnoses: Bilateral primary osteoarthritis of knee [M17.0]; S/P right knee arthroscopy [Z98.890]; Chronic pain of right knee [M25.561, G89.29

## 2024-07-08 NOTE — TELEPHONE ENCOUNTER
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 \par -s/p 6 cycles of carbo-taxol followed by cytoreductive surgery, followed by 2 cycles of adjuvant carbo-taxol. C9 omitted given prolonged cytopenias.  She completed vaginal cuff brachytherapy on 8/6/18. \par -Developed recurrence in 7/2019 with left para-aortic and bilateral internal iliac lymph nodes, suspicious for recurrent/metastatic disease.  S/p 6 cycles of carbo/taxol retreat completed on 11/26/19 for platinum sensitive disease\par -Foundation One testing (results 8/13/19) shows no reportable alterations w/  diagnostic claims.\par MS-Stable, TMB - low (5 Muts/Mb). \par -potential next line of therapy can include Avastin, or lenvatinib+keytruda\par -reviewed restaging scans from 12/8/20 which show POD --> enlarging left para-aortic node measuring 1.9 x 1.3 cm, and interval enlargement of common iliac nodes. \par -reviewed results with patient and daughter.  At this time, her QOL is limited by dementia.  Discussed systemic therapy and possible side effects, vs. potential role of RT in this setting so as not to compromise her QOL further.\par -case was discussed with Dr. Jaimes and Dr. Freire and RT is a potential option\par -daughter will make appointment with Dr. Freire. \par \par \par 2)Vascular dementia - follows neurology, continue donepezil\par

## 2024-07-21 DIAGNOSIS — M54.50 LOW BACK PAIN WITHOUT SCIATICA, UNSPECIFIED BACK PAIN LATERALITY, UNSPECIFIED CHRONICITY: ICD-10-CM

## 2024-07-22 RX ORDER — CYCLOBENZAPRINE HCL 10 MG
TABLET ORAL
Qty: 30 TABLET | Refills: 0 | Status: SHIPPED | OUTPATIENT
Start: 2024-07-22

## 2024-07-24 ENCOUNTER — HOSPITAL ENCOUNTER (OUTPATIENT)
Dept: PHYSICAL THERAPY | Facility: CLINIC | Age: 44
Setting detail: THERAPIES SERIES
Discharge: HOME | End: 2024-07-24

## 2024-07-24 NOTE — FLOWSHEET NOTE
[] [facility]  Outpatient Rehabilitation &  Therapy  [address]  [phone]  [phone] [] [facility]  Outpatient Rehabilitation &  Therapy  [address]  P: [phone]  F: [phone] [x] [facility]  Outpatient Rehabilitation &  Therapy  [address]  P: [phone]  F: [phone] [] [facility]  Outpatient Rehabilitation &  Therapy  [address]  [phone]  [phone] [] [facility]  Outpatient Rehabilitation &  Therapy  [address]   P: [phone]  F: [phone]  [] [facility]  Outpatient Rehabilitation &  Therapy  [address]  P: [phone]  F: [phone] [] [facility]  Outpatient Rehabilitation &  Therapy  [address]  P: [phone]  F: [phone] [] [facility]  Outpatient Rehabilitation &  Therapy  [address]  P: [phone]  F: [phone] [] [facility]  Outpatient Rehabilitation &  Therapy  [address]  P: [phone]  F: [phone]  [] [facility] Outpatient Rehabilitation &  Therapy  [address]  P: [phone]  F: [phone]     Therapy Cancel/No Show note    Date: 2024  Patient: Radhika Doty  : 1980  MRN: [id number]    Cancels/No Shows to date: 1    For today's appointment patient:    [x]  Cancelled    [] Rescheduled appointment    [] No-show     Reason given by patient:    []  Patient ill    []  Conflicting appointment    [] No transportation      [] Conflict with work    [] No reason given    [] Weather related    [] COVID-19    [x] Other:      Comments:  family emergency      [] Next appointment was confirmed    Electronically signed by: Tami Addison PT

## 2024-08-01 ENCOUNTER — TELEPHONE (OUTPATIENT)
Dept: ORTHOPEDIC SURGERY | Age: 44
End: 2024-08-01

## 2024-08-01 ENCOUNTER — OFFICE VISIT (OUTPATIENT)
Dept: ORTHOPEDIC SURGERY | Age: 44
End: 2024-08-01
Payer: COMMERCIAL

## 2024-08-01 VITALS — HEIGHT: 62 IN | WEIGHT: 242 LBS | BODY MASS INDEX: 44.53 KG/M2 | RESPIRATION RATE: 14 BRPM

## 2024-08-01 DIAGNOSIS — M17.0 BILATERAL PRIMARY OSTEOARTHRITIS OF KNEE: Primary | ICD-10-CM

## 2024-08-01 PROCEDURE — 20610 DRAIN/INJ JOINT/BURSA W/O US: CPT | Performed by: PHYSICIAN ASSISTANT

## 2024-08-01 RX ORDER — BUPIVACAINE HYDROCHLORIDE 5 MG/ML
2 INJECTION, SOLUTION PERINEURAL ONCE
Status: COMPLETED | OUTPATIENT
Start: 2024-08-01 | End: 2024-08-01

## 2024-08-01 RX ORDER — LIDOCAINE HYDROCHLORIDE 10 MG/ML
2 INJECTION, SOLUTION INFILTRATION; PERINEURAL ONCE
Status: COMPLETED | OUTPATIENT
Start: 2024-08-01 | End: 2024-08-01

## 2024-08-01 RX ADMIN — LIDOCAINE HYDROCHLORIDE 2 ML: 10 INJECTION, SOLUTION INFILTRATION; PERINEURAL at 14:27

## 2024-08-01 RX ADMIN — BUPIVACAINE HYDROCHLORIDE 10 MG: 5 INJECTION, SOLUTION PERINEURAL at 14:26

## 2024-08-01 RX ADMIN — BUPIVACAINE HYDROCHLORIDE 10 MG: 5 INJECTION, SOLUTION PERINEURAL at 14:25

## 2024-08-05 DIAGNOSIS — G89.29 CHRONIC PAIN OF RIGHT KNEE: ICD-10-CM

## 2024-08-05 DIAGNOSIS — M25.561 CHRONIC PAIN OF RIGHT KNEE: ICD-10-CM

## 2024-08-05 DIAGNOSIS — Z98.890 S/P RIGHT KNEE ARTHROSCOPY: ICD-10-CM

## 2024-08-05 DIAGNOSIS — M17.0 BILATERAL PRIMARY OSTEOARTHRITIS OF KNEE: ICD-10-CM

## 2024-08-05 NOTE — TELEPHONE ENCOUNTER
Patient called to see if she can get a letter stating that she will return to work on Friday, August 9, 2024.  Also she is wanting the refill on Celebrex.  Please let her know when the letter is ready and put it her MyChart where she can print it off.  Thanks.

## 2024-08-06 RX ORDER — CELECOXIB 200 MG/1
CAPSULE ORAL DAILY
Qty: 30 CAPSULE | Refills: 0 | Status: SHIPPED | OUTPATIENT
Start: 2024-08-06

## 2024-08-06 NOTE — TELEPHONE ENCOUNTER
Last fill: 07/08/2024, 30 day supply    Letter written on 8/1/24 allowing patient to remain out of work until 08/06/2024. Will route to VELVET Myers to verify extension to keep patient out of work until 8/9/24.

## 2024-08-08 NOTE — TELEPHONE ENCOUNTER
Patient called in for update on letter for work with extension. States her injections are helping especially with right knee and she would like to return to work tomorrow  Please advise  Thank you

## 2024-08-12 ENCOUNTER — TELEPHONE (OUTPATIENT)
Dept: ORTHOPEDIC SURGERY | Age: 44
End: 2024-08-12

## 2024-08-12 DIAGNOSIS — M17.0 BILATERAL PRIMARY OSTEOARTHRITIS OF KNEE: Primary | ICD-10-CM

## 2024-08-12 NOTE — TELEPHONE ENCOUNTER
Pt is requesting ibuprofen 800 for left knee pain that she is concerned is related to meniscus tear.  Next appt: 8/15  Previous appt: 8/1  Pharmacy: [facility] on [address]

## 2024-08-13 RX ORDER — IBUPROFEN 800 MG/1
800 TABLET ORAL 3 TIMES DAILY PRN
Qty: 90 TABLET | Refills: 0 | Status: SHIPPED | OUTPATIENT
Start: 2024-08-13

## 2024-08-13 NOTE — TELEPHONE ENCOUNTER
Aniket   Pt is requesting ibuprofen 800mg. It does not look like she has previously taken this. On her medication list she has taken tylenol 325 mg and gabapentin 100mg. The gabapentin ended 8/3/24.  She does have an appt. 8/15/24.

## 2024-08-18 DIAGNOSIS — M54.50 LOW BACK PAIN WITHOUT SCIATICA, UNSPECIFIED BACK PAIN LATERALITY, UNSPECIFIED CHRONICITY: ICD-10-CM

## 2024-08-19 RX ORDER — CYCLOBENZAPRINE HCL 10 MG
TABLET ORAL
Qty: 30 TABLET | Refills: 0 | Status: SHIPPED | OUTPATIENT
Start: 2024-08-19

## 2024-08-19 RX ORDER — LORATADINE 10 MG/1
10 TABLET ORAL DAILY
Qty: 30 TABLET | Refills: 0 | Status: SHIPPED | OUTPATIENT
Start: 2024-08-19

## 2024-08-21 ENCOUNTER — OFFICE VISIT (OUTPATIENT)
Dept: ORTHOPEDIC SURGERY | Age: 44
End: 2024-08-21
Payer: COMMERCIAL

## 2024-08-21 VITALS — WEIGHT: 242 LBS | RESPIRATION RATE: 14 BRPM | BODY MASS INDEX: 44.53 KG/M2 | HEIGHT: 62 IN

## 2024-08-21 DIAGNOSIS — M17.0 BILATERAL PRIMARY OSTEOARTHRITIS OF KNEE: Primary | ICD-10-CM

## 2024-08-21 PROCEDURE — G8428 CUR MEDS NOT DOCUMENT: HCPCS | Performed by: PHYSICIAN ASSISTANT

## 2024-08-21 PROCEDURE — 1036F TOBACCO NON-USER: CPT | Performed by: PHYSICIAN ASSISTANT

## 2024-08-21 PROCEDURE — G8417 CALC BMI ABV UP PARAM F/U: HCPCS | Performed by: PHYSICIAN ASSISTANT

## 2024-08-21 PROCEDURE — 99213 OFFICE O/P EST LOW 20 MIN: CPT | Performed by: PHYSICIAN ASSISTANT

## 2024-08-21 RX ORDER — METHYLPREDNISOLONE 4 MG/1
4 TABLET ORAL SEE ADMIN INSTRUCTIONS
Qty: 1 KIT | Refills: 0 | Status: SHIPPED | OUTPATIENT
Start: 2024-08-21 | End: 2024-08-27

## 2024-08-21 NOTE — PROGRESS NOTES
release tablet, Take 1 tablet by mouth every morning, Disp: 30 tablet, Rfl: 3    gabapentin (NEURONTIN) 100 MG capsule, Take 1 capsule by mouth 3 times daily for 60 days., Disp: 90 capsule, Rfl: 1    omeprazole (PRILOSEC) 20 MG delayed release capsule, Take 1 capsule by mouth 2 times daily (before meals), Disp: 180 capsule, Rfl: 1    VENTOLIN  (90 Base) MCG/ACT inhaler, INHALE 2 PUFFS BY MOUTH EVERY 4 (FOUR) HOURS AS NEEDED FOR SHORTNESS OF BREATH (Patient not taking: Reported on 4/9/2024), Disp: , Rfl:     albuterol sulfate HFA (PROVENTIL;VENTOLIN;PROAIR) 108 (90 Base) MCG/ACT inhaler, Inhale 2 puffs into the lungs every 4 hours as needed, Disp: , Rfl:     albuterol (PROVENTIL) (2.5 MG/3ML) 0.083% nebulizer solution, Inhale 1 VIAL by nebulization every 6 hours as needed for wheezing., Disp: , Rfl:     acetaminophen (TYLENOL) 325 MG tablet, Take 2 tablets by mouth every 6 hours as needed for Pain, Disp: , Rfl:     polyethylene glycol (GLYCOLAX) 17 GM/SCOOP powder, Please follow instructions provided to you by your provider. (Patient not taking: Reported on 4/9/2024), Disp: 238 g, Rfl: 0    bisacodyl 5 MG EC tablet, Please follow instructions given to you by your provider. (Patient not taking: Reported on 5/7/2024), Disp: 4 tablet, Rfl: 0  Allergies   Allergen Reactions    Sulfa Antibiotics Hives     Social History     Socioeconomic History    Marital status: Single     Spouse name: Not on file    Number of children: Not on file    Years of education: Not on file    Highest education level: Not on file   Occupational History    Not on file   Tobacco Use    Smoking status: Never    Smokeless tobacco: Never   Vaping Use    Vaping status: Never Used   Substance and Sexual Activity    Alcohol use: Not Currently    Drug use: Never    Sexual activity: Not on file   Other Topics Concern    Not on file   Social History Narrative    Not on file     Social Determinants of Health     Financial Resource Strain: Low Risk

## 2024-09-04 ENCOUNTER — TELEPHONE (OUTPATIENT)
Dept: ORTHOPEDIC SURGERY | Age: 44
End: 2024-09-04

## 2024-09-04 DIAGNOSIS — M54.50 LUMBAR BACK PAIN: Primary | ICD-10-CM

## 2024-09-04 NOTE — TELEPHONE ENCOUNTER
Patient need for Caterina Singer to send a new referral for PT due to she was unable to schedule due to work and family issues.     Referral # [id number]

## 2024-09-08 DIAGNOSIS — M25.561 CHRONIC PAIN OF RIGHT KNEE: ICD-10-CM

## 2024-09-08 DIAGNOSIS — M17.0 BILATERAL PRIMARY OSTEOARTHRITIS OF KNEE: ICD-10-CM

## 2024-09-08 DIAGNOSIS — G89.29 CHRONIC PAIN OF RIGHT KNEE: ICD-10-CM

## 2024-09-08 DIAGNOSIS — Z98.890 S/P RIGHT KNEE ARTHROSCOPY: ICD-10-CM

## 2024-09-09 RX ORDER — CELECOXIB 200 MG/1
CAPSULE ORAL DAILY
Qty: 30 CAPSULE | Refills: 0 | OUTPATIENT
Start: 2024-09-09

## 2024-09-11 DIAGNOSIS — M25.561 CHRONIC PAIN OF RIGHT KNEE: ICD-10-CM

## 2024-09-11 DIAGNOSIS — M17.0 BILATERAL PRIMARY OSTEOARTHRITIS OF KNEE: ICD-10-CM

## 2024-09-11 DIAGNOSIS — Z98.890 S/P RIGHT KNEE ARTHROSCOPY: ICD-10-CM

## 2024-09-11 DIAGNOSIS — G89.29 CHRONIC PAIN OF RIGHT KNEE: ICD-10-CM

## 2024-09-12 ENCOUNTER — TELEPHONE (OUTPATIENT)
Dept: ORTHOPEDIC SURGERY | Age: 44
End: 2024-09-12

## 2024-09-12 RX ORDER — CELECOXIB 200 MG/1
CAPSULE ORAL DAILY
Qty: 30 CAPSULE | Refills: 0 | OUTPATIENT
Start: 2024-09-12

## 2024-09-12 RX ORDER — CELECOXIB 200 MG/1
200 CAPSULE ORAL DAILY
Qty: 60 CAPSULE | Refills: 3 | Status: SHIPPED | OUTPATIENT
Start: 2024-09-12

## 2024-09-18 ENCOUNTER — OFFICE VISIT (OUTPATIENT)
Dept: BARIATRICS/WEIGHT MGMT | Age: 44
End: 2024-09-18
Payer: COMMERCIAL

## 2024-09-18 VITALS
HEART RATE: 92 BPM | HEIGHT: 62 IN | DIASTOLIC BLOOD PRESSURE: 78 MMHG | WEIGHT: 248 LBS | SYSTOLIC BLOOD PRESSURE: 106 MMHG | BODY MASS INDEX: 45.64 KG/M2 | OXYGEN SATURATION: 97 %

## 2024-09-18 DIAGNOSIS — E66.01 OBESITY, CLASS III, BMI 40-49.9 (MORBID OBESITY) (HCC): ICD-10-CM

## 2024-09-18 DIAGNOSIS — K42.9 UMBILICAL HERNIA WITHOUT OBSTRUCTION AND WITHOUT GANGRENE: ICD-10-CM

## 2024-09-18 DIAGNOSIS — M25.562 BILATERAL CHRONIC KNEE PAIN: ICD-10-CM

## 2024-09-18 DIAGNOSIS — R51.9 CHRONIC NONINTRACTABLE HEADACHE, UNSPECIFIED HEADACHE TYPE: ICD-10-CM

## 2024-09-18 DIAGNOSIS — G89.29 BILATERAL CHRONIC KNEE PAIN: ICD-10-CM

## 2024-09-18 DIAGNOSIS — G89.29 CHRONIC NECK AND BACK PAIN: ICD-10-CM

## 2024-09-18 DIAGNOSIS — F90.9 ATTENTION DEFICIT HYPERACTIVITY DISORDER (ADHD), UNSPECIFIED ADHD TYPE: ICD-10-CM

## 2024-09-18 DIAGNOSIS — J45.20 MILD INTERMITTENT ASTHMA WITHOUT COMPLICATION: ICD-10-CM

## 2024-09-18 DIAGNOSIS — M54.2 CHRONIC NECK AND BACK PAIN: ICD-10-CM

## 2024-09-18 DIAGNOSIS — M54.9 CHRONIC NECK AND BACK PAIN: ICD-10-CM

## 2024-09-18 DIAGNOSIS — K21.9 GASTROESOPHAGEAL REFLUX DISEASE, UNSPECIFIED WHETHER ESOPHAGITIS PRESENT: ICD-10-CM

## 2024-09-18 DIAGNOSIS — F41.9 ANXIETY: ICD-10-CM

## 2024-09-18 DIAGNOSIS — K58.0 IRRITABLE BOWEL SYNDROME WITH DIARRHEA: ICD-10-CM

## 2024-09-18 DIAGNOSIS — M19.90 ARTHRITIS: Primary | ICD-10-CM

## 2024-09-18 DIAGNOSIS — M25.561 BILATERAL CHRONIC KNEE PAIN: ICD-10-CM

## 2024-09-18 DIAGNOSIS — G89.29 CHRONIC NONINTRACTABLE HEADACHE, UNSPECIFIED HEADACHE TYPE: ICD-10-CM

## 2024-09-18 DIAGNOSIS — Z87.442 HISTORY OF KIDNEY STONES: ICD-10-CM

## 2024-09-18 DIAGNOSIS — M79.7 FIBROMYALGIA: ICD-10-CM

## 2024-09-18 PROBLEM — E66.813 OBESITY, CLASS III, BMI 40-49.9 (MORBID OBESITY) (HCC): Status: ACTIVE | Noted: 2024-09-18

## 2024-09-18 PROCEDURE — G8417 CALC BMI ABV UP PARAM F/U: HCPCS | Performed by: NURSE PRACTITIONER

## 2024-09-18 PROCEDURE — 99214 OFFICE O/P EST MOD 30 MIN: CPT | Performed by: NURSE PRACTITIONER

## 2024-09-18 PROCEDURE — 1036F TOBACCO NON-USER: CPT | Performed by: NURSE PRACTITIONER

## 2024-09-18 PROCEDURE — G8427 DOCREV CUR MEDS BY ELIG CLIN: HCPCS | Performed by: NURSE PRACTITIONER

## 2024-09-18 RX ORDER — RUXOLITINIB 15 MG/G
CREAM TOPICAL
COMMUNITY
Start: 2024-08-22

## 2024-09-19 ENCOUNTER — HOSPITAL ENCOUNTER (OUTPATIENT)
Age: 44
Discharge: HOME | End: 2024-09-19
Payer: COMMERCIAL

## 2024-09-19 ENCOUNTER — HOSPITAL ENCOUNTER (OUTPATIENT)
Age: 44
Discharge: HOME | End: 2024-09-21
Payer: COMMERCIAL

## 2024-09-19 ENCOUNTER — OFFICE VISIT (OUTPATIENT)
Dept: ORTHOPEDIC SURGERY | Age: 44
End: 2024-09-19
Payer: COMMERCIAL

## 2024-09-19 VITALS — BODY MASS INDEX: 45.64 KG/M2 | WEIGHT: 248 LBS | RESPIRATION RATE: 15 BRPM | HEIGHT: 62 IN

## 2024-09-19 DIAGNOSIS — E66.01 OBESITY, CLASS III, BMI 40-49.9 (MORBID OBESITY) (HCC): ICD-10-CM

## 2024-09-19 DIAGNOSIS — M25.562 BILATERAL CHRONIC KNEE PAIN: ICD-10-CM

## 2024-09-19 DIAGNOSIS — G89.29 CHRONIC NONINTRACTABLE HEADACHE, UNSPECIFIED HEADACHE TYPE: ICD-10-CM

## 2024-09-19 DIAGNOSIS — M25.562 BILATERAL CHRONIC KNEE PAIN: Primary | ICD-10-CM

## 2024-09-19 DIAGNOSIS — K21.9 GASTROESOPHAGEAL REFLUX DISEASE, UNSPECIFIED WHETHER ESOPHAGITIS PRESENT: ICD-10-CM

## 2024-09-19 DIAGNOSIS — K58.0 IRRITABLE BOWEL SYNDROME WITH DIARRHEA: ICD-10-CM

## 2024-09-19 DIAGNOSIS — M25.561 BILATERAL CHRONIC KNEE PAIN: ICD-10-CM

## 2024-09-19 DIAGNOSIS — R51.9 CHRONIC NONINTRACTABLE HEADACHE, UNSPECIFIED HEADACHE TYPE: ICD-10-CM

## 2024-09-19 DIAGNOSIS — M54.2 CHRONIC NECK AND BACK PAIN: ICD-10-CM

## 2024-09-19 DIAGNOSIS — M54.9 CHRONIC NECK AND BACK PAIN: ICD-10-CM

## 2024-09-19 DIAGNOSIS — M54.50 LOW BACK PAIN WITHOUT SCIATICA, UNSPECIFIED BACK PAIN LATERALITY, UNSPECIFIED CHRONICITY: ICD-10-CM

## 2024-09-19 DIAGNOSIS — F90.9 ATTENTION DEFICIT HYPERACTIVITY DISORDER (ADHD), UNSPECIFIED ADHD TYPE: ICD-10-CM

## 2024-09-19 DIAGNOSIS — M25.561 BILATERAL CHRONIC KNEE PAIN: Primary | ICD-10-CM

## 2024-09-19 DIAGNOSIS — F41.9 ANXIETY: ICD-10-CM

## 2024-09-19 DIAGNOSIS — G89.29 CHRONIC NECK AND BACK PAIN: ICD-10-CM

## 2024-09-19 DIAGNOSIS — G89.29 BILATERAL CHRONIC KNEE PAIN: ICD-10-CM

## 2024-09-19 DIAGNOSIS — M79.7 FIBROMYALGIA: ICD-10-CM

## 2024-09-19 DIAGNOSIS — G89.29 CHRONIC PAIN OF RIGHT KNEE: ICD-10-CM

## 2024-09-19 DIAGNOSIS — G89.29 BILATERAL CHRONIC KNEE PAIN: Primary | ICD-10-CM

## 2024-09-19 DIAGNOSIS — Z98.890 S/P RIGHT KNEE ARTHROSCOPY: ICD-10-CM

## 2024-09-19 DIAGNOSIS — M19.90 ARTHRITIS: ICD-10-CM

## 2024-09-19 DIAGNOSIS — J45.20 MILD INTERMITTENT ASTHMA WITHOUT COMPLICATION: ICD-10-CM

## 2024-09-19 DIAGNOSIS — Z87.442 HISTORY OF KIDNEY STONES: ICD-10-CM

## 2024-09-19 DIAGNOSIS — M17.0 BILATERAL PRIMARY OSTEOARTHRITIS OF KNEE: ICD-10-CM

## 2024-09-19 DIAGNOSIS — K42.9 UMBILICAL HERNIA WITHOUT OBSTRUCTION AND WITHOUT GANGRENE: ICD-10-CM

## 2024-09-19 DIAGNOSIS — M25.561 CHRONIC PAIN OF RIGHT KNEE: ICD-10-CM

## 2024-09-19 LAB
ALBUMIN SERPL-MCNC: 4.2 G/DL (ref 3.5–5.2)
ALP SERPL-CCNC: 90 U/L (ref 35–104)
ALT SERPL-CCNC: 13 U/L (ref 5–33)
ANION GAP SERPL CALCULATED.3IONS-SCNC: 9 MMOL/L (ref 9–17)
AST SERPL-CCNC: 16 U/L
BASOPHILS # BLD: 0.1 K/UL (ref 0–0.2)
BASOPHILS NFR BLD: 1 % (ref 0–2)
BILIRUB SERPL-MCNC: 0.4 MG/DL (ref 0.3–1.2)
BUN SERPL-MCNC: 14 MG/DL (ref 6–20)
CALCIUM SERPL-MCNC: 9.1 MG/DL (ref 8.6–10.4)
CHLORIDE SERPL-SCNC: 99 MMOL/L (ref 98–107)
CHOLEST SERPL-MCNC: 227 MG/DL
CHOLESTEROL/HDL RATIO: 4.8
CO2 SERPL-SCNC: 27 MMOL/L (ref 20–31)
CREAT SERPL-MCNC: 0.6 MG/DL (ref 0.5–0.9)
CRP SERPL HS-MCNC: 12.7 MG/L (ref 0–5)
EOSINOPHIL # BLD: 0.2 K/UL (ref 0–0.4)
EOSINOPHILS RELATIVE PERCENT: 3 % (ref 0–4)
ERYTHROCYTE [DISTWIDTH] IN BLOOD BY AUTOMATED COUNT: 13.7 % (ref 11.5–14.9)
ERYTHROCYTE [SEDIMENTATION RATE] IN BLOOD BY PHOTOMETRIC METHOD: 9 MM/HR (ref 0–20)
EST. AVERAGE GLUCOSE BLD GHB EST-MCNC: 111 MG/DL
GFR, ESTIMATED: >90 ML/MIN/1.73M2
GLUCOSE SERPL-MCNC: 103 MG/DL (ref 70–99)
HBA1C MFR BLD: 5.5 % (ref 4–6)
HCT VFR BLD AUTO: 38.4 % (ref 36–46)
HDLC SERPL-MCNC: 47 MG/DL
HGB BLD-MCNC: 13.1 G/DL (ref 12–16)
LDLC SERPL CALC-MCNC: 149 MG/DL (ref 0–130)
LYMPHOCYTES NFR BLD: 2 K/UL (ref 1–4.8)
LYMPHOCYTES RELATIVE PERCENT: 26 % (ref 24–44)
MCH RBC QN AUTO: 29.9 PG (ref 26–34)
MCHC RBC AUTO-ENTMCNC: 34.1 G/DL (ref 31–37)
MCV RBC AUTO: 87.6 FL (ref 80–100)
MONOCYTES NFR BLD: 0.5 K/UL (ref 0.1–1.3)
MONOCYTES NFR BLD: 7 % (ref 1–7)
NEUTROPHILS NFR BLD: 63 % (ref 36–66)
NEUTS SEG NFR BLD: 4.7 K/UL (ref 1.3–9.1)
PLATELET # BLD AUTO: 500 K/UL (ref 150–450)
PMV BLD AUTO: 6.9 FL (ref 6–12)
POTASSIUM SERPL-SCNC: 4.3 MMOL/L (ref 3.7–5.3)
PROT SERPL-MCNC: 7.6 G/DL (ref 6.4–8.3)
RBC # BLD AUTO: 4.39 M/UL (ref 4–5.2)
SODIUM SERPL-SCNC: 135 MMOL/L (ref 135–144)
TRIGL SERPL-MCNC: 157 MG/DL
TSH SERPL DL<=0.05 MIU/L-ACNC: 3.02 UIU/ML (ref 0.3–5)
URATE SERPL-MCNC: 5.8 MG/DL (ref 2.4–5.7)
WBC OTHER # BLD: 7.6 K/UL (ref 3.5–11)

## 2024-09-19 PROCEDURE — 84550 ASSAY OF BLOOD/URIC ACID: CPT

## 2024-09-19 PROCEDURE — 84443 ASSAY THYROID STIM HORMONE: CPT

## 2024-09-19 PROCEDURE — 85025 COMPLETE CBC W/AUTO DIFF WBC: CPT

## 2024-09-19 PROCEDURE — 99213 OFFICE O/P EST LOW 20 MIN: CPT | Performed by: PHYSICIAN ASSISTANT

## 2024-09-19 PROCEDURE — 83036 HEMOGLOBIN GLYCOSYLATED A1C: CPT

## 2024-09-19 PROCEDURE — 80053 COMPREHEN METABOLIC PANEL: CPT

## 2024-09-19 PROCEDURE — G8428 CUR MEDS NOT DOCUMENT: HCPCS | Performed by: PHYSICIAN ASSISTANT

## 2024-09-19 PROCEDURE — 80061 LIPID PANEL: CPT

## 2024-09-19 PROCEDURE — G8417 CALC BMI ABV UP PARAM F/U: HCPCS | Performed by: PHYSICIAN ASSISTANT

## 2024-09-19 PROCEDURE — 36415 COLL VENOUS BLD VENIPUNCTURE: CPT

## 2024-09-19 PROCEDURE — 85652 RBC SED RATE AUTOMATED: CPT

## 2024-09-19 PROCEDURE — 93005 ELECTROCARDIOGRAM TRACING: CPT

## 2024-09-19 PROCEDURE — 86140 C-REACTIVE PROTEIN: CPT

## 2024-09-19 PROCEDURE — 1036F TOBACCO NON-USER: CPT | Performed by: PHYSICIAN ASSISTANT

## 2024-09-19 RX ORDER — CELECOXIB 200 MG/1
200 CAPSULE ORAL 2 TIMES DAILY
Qty: 60 CAPSULE | Refills: 0 | Status: SHIPPED | OUTPATIENT
Start: 2024-09-19

## 2024-09-19 RX ORDER — LORATADINE 10 MG/1
10 TABLET ORAL DAILY
Qty: 30 TABLET | Refills: 0 | Status: SHIPPED | OUTPATIENT
Start: 2024-09-19

## 2024-09-19 RX ORDER — CYCLOBENZAPRINE HCL 10 MG
TABLET ORAL
Qty: 30 TABLET | Refills: 0 | Status: SHIPPED | OUTPATIENT
Start: 2024-09-19

## 2024-09-20 LAB
EKG ATRIAL RATE: 82 BPM
EKG P AXIS: 47 DEGREES
EKG P-R INTERVAL: 164 MS
EKG Q-T INTERVAL: 386 MS
EKG QRS DURATION: 84 MS
EKG QTC CALCULATION (BAZETT): 450 MS
EKG R AXIS: 30 DEGREES
EKG T AXIS: 38 DEGREES
EKG VENTRICULAR RATE: 82 BPM

## 2024-09-30 ENCOUNTER — HOSPITAL ENCOUNTER (OUTPATIENT)
Dept: PHYSICAL THERAPY | Age: 44
Setting detail: THERAPIES SERIES
Discharge: HOME | End: 2024-09-30
Attending: PHYSICIAN ASSISTANT
Payer: COMMERCIAL

## 2024-09-30 PROCEDURE — 97162 PT EVAL MOD COMPLEX 30 MIN: CPT

## 2024-09-30 NOTE — CONSULTS
[x] [facility]   Outpatient Rehabilitation & Therapy  [address]  P: [phone]   F: [phone]     Physical Therapy Lumbar Evaluation    Date:  2024  Patient: Radhika Doty  : 1980  MRN: [id number]  Physician: Caterina Singer PA      Insurance: [facility] with  visits remaining --AUTH AFTER EVAL  Medical Diagnosis: M54.50 (ICD-10-CM) - Lumbar back pain    Rehab Codes: M62.81 , M25. 60 , M54.50 , M25.562   Onset Date: 2024   Next 's appt: 10/17/2024 with [facility]    Precautions: none    Subjective:   Patient comes in with lumbar and thoracic pain. Reports that her lumbar back pain started about mid point this year and originally thought it was kidney stones. Was found to have an injection and got treated for it. Still hurt for weeks after and went back to the ER and was told that she has arthritis. She notes that years ago she did fall wile paying with her son and smashed between rocks but is unsure if this caused anything. Pain ranges from dull to sharp. Notes to have sciatic at LLE but it has not bothered her for a few weeks. Pain seems to mainly be from bra line and down throughout the lumbar. Sometimes notes numbness and cold sensation at the lumbar. Certain sitting positions causes numbness at the legs. Does have a history of DICKSON knee pain and has been getting Cortizone injections that stopped working. Has also gotten gel injections that seemed to help with lateral pain but still has sharp medial DICKSON knee pain with the walking. Also has a history of surgery at the R knee as well.   Does not cervical pain but understands that this was not in the referral.     PMHx: [] Unremarkable [] Diabetes [] HTN  [] Pacemaker   [] MI/Heart Problems [] Cancer [x] Arthritis [x] Other: hyperlipidemia               Past Medical History:   Diagnosis Date    Arthritis     arthro both knees    Asthma     as a child & exercise induced    Borderline diabetes     COVID-19 2023

## 2024-10-02 DIAGNOSIS — M54.50 LOW BACK PAIN WITHOUT SCIATICA, UNSPECIFIED BACK PAIN LATERALITY, UNSPECIFIED CHRONICITY: ICD-10-CM

## 2024-10-03 RX ORDER — CYCLOBENZAPRINE HCL 10 MG
TABLET ORAL
Qty: 30 TABLET | Refills: 0 | Status: SHIPPED | OUTPATIENT
Start: 2024-10-03

## 2024-10-17 ENCOUNTER — OFFICE VISIT (OUTPATIENT)
Dept: ORTHOPEDIC SURGERY | Age: 44
End: 2024-10-17
Payer: COMMERCIAL

## 2024-10-17 VITALS — WEIGHT: 248 LBS | BODY MASS INDEX: 45.64 KG/M2 | HEIGHT: 62 IN

## 2024-10-17 DIAGNOSIS — M54.50 LUMBAR BACK PAIN: Primary | ICD-10-CM

## 2024-10-17 DIAGNOSIS — G56.02 LEFT CARPAL TUNNEL SYNDROME: ICD-10-CM

## 2024-10-17 PROCEDURE — G8427 DOCREV CUR MEDS BY ELIG CLIN: HCPCS | Performed by: PHYSICIAN ASSISTANT

## 2024-10-17 PROCEDURE — G8484 FLU IMMUNIZE NO ADMIN: HCPCS | Performed by: PHYSICIAN ASSISTANT

## 2024-10-17 PROCEDURE — 1036F TOBACCO NON-USER: CPT | Performed by: PHYSICIAN ASSISTANT

## 2024-10-17 PROCEDURE — 99213 OFFICE O/P EST LOW 20 MIN: CPT | Performed by: PHYSICIAN ASSISTANT

## 2024-10-17 PROCEDURE — G8417 CALC BMI ABV UP PARAM F/U: HCPCS | Performed by: PHYSICIAN ASSISTANT

## 2024-10-17 RX ORDER — CYCLOBENZAPRINE HCL 10 MG
10 TABLET ORAL 3 TIMES DAILY PRN
Qty: 90 TABLET | Refills: 0 | Status: SHIPPED | OUTPATIENT
Start: 2024-10-17 | End: 2024-11-16

## 2024-10-17 NOTE — PROGRESS NOTES
Patient ID: Radhika Doty is a 44 y.o. female    Chief Compliant:  Chief Complaint   Patient presents with    Back Pain     LBP/thoracic      HPI:  This is a 44 y.o. female who presents to the clinic today for follow-up of low back pain as well as numbness and tingling to her left first 3 digits.  Patient reports the low back pain causes intermittent bilateral leg pain and numbness and tingling.  She is just started physical therapy and plans on completing the rest of it this month and next.  She states that the Celebrex does help with her knees and her mid back.  Patient does note that her mid back and thoracic pain has improved and resolved at this point.  Her next complaint is that she started to develop some numbness and tingling to the first 3 digits of her left hand this will wake her up at night and she feels like she has to shake it out to get that symptom to go away.  She has not tried any night splints at this point I do believe she is experiencing some carpal tunnel like symptoms.  She denies any other changes to her symptoms since I last saw her in May of this year..       Review of Systems   All other systems reviewed and are negative.    Past History:    Current Outpatient Medications:     cyclobenzaprine (FLEXERIL) 10 MG tablet, Take 1 tablet by mouth 3 times daily as needed for Muscle spasms, Disp: 90 tablet, Rfl: 0    loratadine (CLARITIN) 10 MG tablet, TAKE 1 TABLET BY MOUTH EVERY DAY, Disp: 30 tablet, Rfl: 0    celecoxib (CELEBREX) 200 MG capsule, Take 1 capsule by mouth 2 times daily, Disp: 60 capsule, Rfl: 0    OPZELURA 1.5 % CREA, , Disp: , Rfl:     ibuprofen (ADVIL;MOTRIN) 800 MG tablet, Take 1 tablet by mouth 3 times daily as needed for Pain (Patient not taking: Reported on 9/18/2024), Disp: 90 tablet, Rfl: 0    buPROPion (WELLBUTRIN XL) 150 MG extended release tablet, Take 1 tablet by mouth every morning, Disp: 30 tablet, Rfl: 3    gabapentin (NEURONTIN) 100 MG capsule, Take 1 capsule by

## 2024-10-18 DIAGNOSIS — E66.01 SEVERE OBESITY (BMI >= 40): ICD-10-CM

## 2024-10-21 ENCOUNTER — HOSPITAL ENCOUNTER (OUTPATIENT)
Dept: PHYSICAL THERAPY | Age: 44
Setting detail: THERAPIES SERIES
Discharge: HOME | End: 2024-10-21
Attending: PHYSICIAN ASSISTANT
Payer: COMMERCIAL

## 2024-10-21 PROCEDURE — 97110 THERAPEUTIC EXERCISES: CPT

## 2024-10-21 RX ORDER — BUPROPION HYDROCHLORIDE 150 MG/1
150 TABLET ORAL EVERY MORNING
Qty: 30 TABLET | Refills: 0 | Status: SHIPPED | OUTPATIENT
Start: 2024-10-21 | End: 2024-10-22 | Stop reason: SDUPTHER

## 2024-10-21 NOTE — FLOWSHEET NOTE
given.     Plan: [x] Continue per plan of care.   [] Other:      Treatment Charges: Mins Units   []  Modalities     [x]  Ther Exercise 46 3   []  Manual Therapy     []  Ther Activities     []  Aquatics     []  Neuromuscular     [] Vasocompression     [] Gait Training     [] Dry needling        [] 1 or 2 muscles        [] 3 or more muscles     []  Other     Total Billable time 46 3     Time In:1045            Time Out: 1131    Electronically signed by:  Letha Fulton PTA

## 2024-10-22 ENCOUNTER — HOSPITAL ENCOUNTER (OUTPATIENT)
Age: 44
Setting detail: SPECIMEN
Discharge: HOME | End: 2024-10-22

## 2024-10-22 ENCOUNTER — OFFICE VISIT (OUTPATIENT)
Dept: INTERNAL MEDICINE CLINIC | Age: 44
End: 2024-10-22
Payer: COMMERCIAL

## 2024-10-22 VITALS
WEIGHT: 247 LBS | OXYGEN SATURATION: 98 % | HEIGHT: 62 IN | SYSTOLIC BLOOD PRESSURE: 130 MMHG | HEART RATE: 62 BPM | BODY MASS INDEX: 45.45 KG/M2 | DIASTOLIC BLOOD PRESSURE: 78 MMHG

## 2024-10-22 DIAGNOSIS — E66.01 OBESITY, CLASS III, BMI 40-49.9 (MORBID OBESITY): ICD-10-CM

## 2024-10-22 DIAGNOSIS — R79.82 ELEVATED C-REACTIVE PROTEIN (CRP): ICD-10-CM

## 2024-10-22 DIAGNOSIS — M25.562 BILATERAL CHRONIC KNEE PAIN: ICD-10-CM

## 2024-10-22 DIAGNOSIS — M25.561 BILATERAL CHRONIC KNEE PAIN: ICD-10-CM

## 2024-10-22 DIAGNOSIS — K27.9 PUD (PEPTIC ULCER DISEASE): ICD-10-CM

## 2024-10-22 DIAGNOSIS — E66.01 SEVERE OBESITY (BMI >= 40): ICD-10-CM

## 2024-10-22 DIAGNOSIS — E78.2 MODERATE MIXED HYPERLIPIDEMIA NOT REQUIRING STATIN THERAPY: Primary | ICD-10-CM

## 2024-10-22 DIAGNOSIS — M79.7 FIBROMYALGIA: ICD-10-CM

## 2024-10-22 DIAGNOSIS — G89.29 BILATERAL CHRONIC KNEE PAIN: ICD-10-CM

## 2024-10-22 LAB — CRP SERPL HS-MCNC: 16.1 MG/L (ref 0–5)

## 2024-10-22 PROCEDURE — G8484 FLU IMMUNIZE NO ADMIN: HCPCS | Performed by: INTERNAL MEDICINE

## 2024-10-22 PROCEDURE — 99214 OFFICE O/P EST MOD 30 MIN: CPT | Performed by: INTERNAL MEDICINE

## 2024-10-22 PROCEDURE — G8417 CALC BMI ABV UP PARAM F/U: HCPCS | Performed by: INTERNAL MEDICINE

## 2024-10-22 PROCEDURE — G8427 DOCREV CUR MEDS BY ELIG CLIN: HCPCS | Performed by: INTERNAL MEDICINE

## 2024-10-22 PROCEDURE — 1036F TOBACCO NON-USER: CPT | Performed by: INTERNAL MEDICINE

## 2024-10-22 RX ORDER — BUPROPION HYDROCHLORIDE 150 MG/1
150 TABLET ORAL EVERY MORNING
Qty: 90 TABLET | Refills: 1 | Status: SHIPPED | OUTPATIENT
Start: 2024-10-22

## 2024-10-22 NOTE — PROGRESS NOTES
daily as needed for Pain (Patient not taking: Reported on 9/18/2024) 90 tablet 0    VENTOLIN  (90 Base) MCG/ACT inhaler INHALE 2 PUFFS BY MOUTH EVERY 4 (FOUR) HOURS AS NEEDED FOR SHORTNESS OF BREATH (Patient not taking: Reported on 4/9/2024)      acetaminophen (TYLENOL) 325 MG tablet Take 2 tablets by mouth every 6 hours as needed for Pain (Patient not taking: Reported on 9/18/2024)      polyethylene glycol (GLYCOLAX) 17 GM/SCOOP powder Please follow instructions provided to you by your provider. (Patient not taking: Reported on 4/9/2024) 238 g 0    bisacodyl 5 MG EC tablet Please follow instructions given to you by your provider. (Patient not taking: Reported on 9/18/2024) 4 tablet 0     No current facility-administered medications for this visit.         OBJECTIVE:  Vitals:    10/22/24 1005   BP: 130/78   Pulse: 62   SpO2: 98%     Body mass index is 45.18 kg/m².        Focal exam:    General exam (except above):  General appearance - well appearing, alert, in no acute distress  Head - Atraumatic, normocephalic  Eyes - EOMI, no jaundice or pallor  Lungs - Lungs clear to auscultation.  No wheezing, rhonchi, rales  Heart - RRR without murmur, gallop, or rubs.  No ectopy  Abdomen - Abdomen soft, non-tender. Bowel sounds normal. No masses, organomegaly  Extremities -No significant edema, or skin discoloration. Good capillary refill.  Neuro - Pt Alert, awake and oriented x 3. No gross focal neurological deficits    ASSESSMENT AND PLAN (MEDICAL DECISION MAKING):    Radhika was seen today for weight management and discuss labs.    Diagnoses and all orders for this visit:    Moderate mixed hyperlipidemia not requiring statin therapy  Comments:  disucssed- will manage with diet and exercise for now.    Bilateral chronic knee pain    Fibromyalgia    PUD (peptic ulcer disease)    Elevated C-reactive protein (CRP)  -     C-Reactive Protein; Future    Obesity, Class III, BMI 40-49.9 (morbid obesity)  Comments:  working

## 2024-10-22 NOTE — RESULT ENCOUNTER NOTE
CRP seems to be slightly higher than 1 month ago.  However this is a very nonspecific marker of inflammation and since patient has been feeling well generally I do not recommend any intervention or further testing at this time.

## 2024-10-30 ENCOUNTER — TELEPHONE (OUTPATIENT)
Dept: INTERNAL MEDICINE CLINIC | Age: 44
End: 2024-10-30

## 2024-10-30 DIAGNOSIS — M19.90 ARTHRITIS: Primary | ICD-10-CM

## 2024-10-30 NOTE — TELEPHONE ENCOUNTER
Patient called informing that this week she having body pain / aching feeling all over through out her body, extreme fatigue daily and  her fingers are swollen.      States at the time of labs C-Reactive Protein had been drawn she was generally feeling well but now she is having pain everywhere throughout her body , would like to know if she should be referred to Rheumatology ?

## 2024-11-05 ENCOUNTER — HOSPITAL ENCOUNTER (OUTPATIENT)
Dept: PHYSICAL THERAPY | Age: 44
Setting detail: THERAPIES SERIES
Discharge: HOME | End: 2024-11-05
Attending: PHYSICIAN ASSISTANT
Payer: COMMERCIAL

## 2024-11-05 PROCEDURE — 97110 THERAPEUTIC EXERCISES: CPT

## 2024-11-05 NOTE — FLOWSHEET NOTE
[facility]   Outpatient Rehabilitation & Therapy  [address]  P: [phone]   F: [phone]    Physical Therapy Daily Treatment Note      Date:  2024  Patient Name:  Radhika Doty    :  1980  MRN: [id number]  Physician: Caterina Singer PA                               Insurance: [facility] with 21/30 visits remaining --AUTH AFTER EVAL 24-24- 22253 36 units, 72378 12 units, 03460 36 units, 36341 6 units  Medical Diagnosis: M54.50 (ICD-10-CM) - Lumbar back pain          Rehab Codes: M62.81 , M25. 60 , M54.50 , M25.562   Onset Date: 2024                     Next 's appt: 2024 with ortho   Visit# / total visits: 3/12  Cancels/No Shows: 0/1    Subjective:  Patient reports her lumbar spine is more painful vs the thoracic spine. She notes B hands (R>L) going numb when she jaime her hair. Notes a plan with PA to do neck xrays at follow up. She notes neck pain at 4.5 /10 in the mid to lower cervical spine. She notes compliance other than the last two days with HEP.     Pain:  [x] Yes  [] No Location: Low back - L SI jt   Pain Rating: (0-10 scale) 5/10  Pain altered Tx:  [] No  [] Yes  Action:  Comments:    Objective:  Modalities:   Precautions:  Exercises: MHP with all supine exercises this date.  INTERVENTIONS  Reps/ Time Weight/ Level Completed  Today Comments               MODALITIES                                    MANUAL                                    EXERCISES            SKTC  3x30\"  strap   x     Piriformis stretch hook lying figure 4 3x30\"  strap   x  towel assist; pain noted in knee when going into figure 4 position   Hamstring stretch 3x30\"  strap      Active HS stretch  15x  3 s hold  x    Abdominal bracing 15x 5\"      PPT 15x 5\"  x Verbal cues to correct    LTR 10x 5\"  x    90/90 heel tap  10x2 ea   x Oppo limb still in hook lying    Single knee fall out  2x10 ea  Red  x    Bridge with loop  10x2  Red  x    Sidelying open books  10x ea   x

## 2024-11-07 DIAGNOSIS — M17.0 BILATERAL PRIMARY OSTEOARTHRITIS OF KNEE: ICD-10-CM

## 2024-11-07 DIAGNOSIS — M25.561 CHRONIC PAIN OF RIGHT KNEE: ICD-10-CM

## 2024-11-07 DIAGNOSIS — Z98.890 S/P RIGHT KNEE ARTHROSCOPY: ICD-10-CM

## 2024-11-07 DIAGNOSIS — G89.29 CHRONIC PAIN OF RIGHT KNEE: ICD-10-CM

## 2024-11-08 RX ORDER — LORATADINE 10 MG/1
10 TABLET ORAL DAILY
Qty: 30 TABLET | Refills: 0 | Status: SHIPPED | OUTPATIENT
Start: 2024-11-08

## 2024-11-08 NOTE — TELEPHONE ENCOUNTER
Patient is following up on previous request for her medication refill. She had two pills left and is asking for a return call to advise.    Thank you.

## 2024-11-11 ENCOUNTER — HOSPITAL ENCOUNTER (OUTPATIENT)
Dept: PHYSICAL THERAPY | Age: 44
Setting detail: THERAPIES SERIES
Discharge: HOME | End: 2024-11-11
Attending: PHYSICIAN ASSISTANT
Payer: COMMERCIAL

## 2024-11-11 PROCEDURE — 97110 THERAPEUTIC EXERCISES: CPT

## 2024-11-11 RX ORDER — CELECOXIB 200 MG/1
200 CAPSULE ORAL 2 TIMES DAILY
Qty: 60 CAPSULE | Refills: 0 | Status: SHIPPED | OUTPATIENT
Start: 2024-11-11

## 2024-11-11 RX ORDER — FLUTICASONE PROPIONATE 50 MCG
SPRAY, SUSPENSION (ML) NASAL
Qty: 32 G | Refills: 0 | Status: SHIPPED | OUTPATIENT
Start: 2024-11-11

## 2024-11-11 NOTE — FLOWSHEET NOTE
[facility]   Outpatient Rehabilitation & Therapy  [address]  P: [phone]   F: [phone]    Physical Therapy Daily Treatment Note      Date:  2024  Patient Name:  Radhika Doty    :  1980  MRN: [id number]  Physician: Caterina Singer PA                               Insurance: [facility] with  visits remaining --AUTH AFTER EVAL 24-24- 56736 36 units, 72384 12 units, 55953 36 units, 56812 6 units  Medical Diagnosis: M54.50 (ICD-10-CM) - Lumbar back pain          Rehab Codes: M62.81 , M25. 60 , M54.50 , M25.562   Onset Date: 2024                     Next 's appt: 2024 with ortho for cervical and lumbar  Visit# / total visits:   Cancels/No Shows: 0/2    Subjective:  Patient comes in reporting that she took some pain meds today that has helped decrease her pain. Reports that she felt okay after last session. Does have an appointment later this month for a Rheumatologist.     Pain:  [x] Yes  [] No Location: Low back / thoracic / cervical  Pain Rating: (0-10 scale) 3/10  Pain altered Tx:  [x] No  [] Yes  Action:  Comments:    Objective:  Modalities:   Precautions: none  Exercises: MHP with all supine exercises this date.  INTERVENTIONS  Reps/ Time Weight/ Level Completed  Today Comments               MODALITIES                                    MANUAL                                    EXERCISES            Mat Level:       SKTC  3x30\"  strap   x     Piriformis stretch hook lying figure 4 3x30\"  strap   x  towel assist; pain noted in knee when going into figure 4 position   Hamstring stretch 3x30\"  strap      Active HS stretch  15x  3 s hold      Abdominal bracing 15x 5\"      PPT 15x 5\"  x Verbal cues to correct    PPT w/ Marches  10 x  x Added    LTR 10x 5\"  x    90/90 heel hover  10x2 ea   x Oppo limb still in hook lying    progressed to heel hover instead of taps   SLR 10 x  x Added    Single knee fall out  3 x 10 ea  Red  x

## 2024-11-18 ENCOUNTER — HOSPITAL ENCOUNTER (OUTPATIENT)
Dept: PHYSICAL THERAPY | Age: 44
Setting detail: THERAPIES SERIES
Discharge: HOME | End: 2024-11-18
Attending: PHYSICIAN ASSISTANT
Payer: COMMERCIAL

## 2024-11-18 PROCEDURE — 97110 THERAPEUTIC EXERCISES: CPT

## 2024-11-18 NOTE — PROGRESS NOTES
[facility]   Outpatient Rehabilitation & Therapy  [address]  P: [phone]   F: [phone]    Physical Therapy Daily Treatment Note / Progress Note      Date:  2024  Patient Name:  Radhika Doty    :  1980  MRN: [id number]  Physician: Caterina Singer PA                               Insurance: [facility] with  visits remaining --AUTH AFTER EVAL 24-24- 34411 36 units, 59052 12 units, 45297 36 units, 25419 6 units  Medical Diagnosis: M54.50 (ICD-10-CM) - Lumbar back pain          Rehab Codes: M62.81 , M25. 60 , M54.50 , M25.562   Onset Date: 2024                     Next 's appt: 2024 with ortho for cervical and lumbar  Visit# / total visits:   Cancels/No Shows: 0/2    Formal reporting period: 2024 - 2024    Subjective:  Patient comes in reporting more pain and soreness coming in. Was sore after last session but less stiff. Patient reports that therapy maybe helps a little improvement with her lumbar. She reports that her main issue is her cervical and the numbness from this. Does report that she understands that she is here for her back but will be talking to her referring doctor at the start of December and is going to ask for a referral for therapy on this.  States that she does feel good when leaving therapy and that her lumbar is less stiff. Pain at worst is 6-7/10.    Pain:  [x] Yes  [] No Location: Low back , DICKSON knees Pain Rating: (0-10 scale) 5.5/10 , 6/10  Pain altered Tx:  [x] No  [] Yes  Action:  Comments:    Objective:     Range of Motion  Left  Range of Motion  Right   Lumbar Flexion WFL   Lumbar Extension WFL   Lumbar Rotation WFL   WFL     Lumbar Side Bend WFL WFL  Pain R lumbar     Functional Assessment Used: Mod. MAMADOU  Previous Score: 34% disability   Current Status Score: 42% disability  Goal Status Score: 22% disability or less    Modalities:   Precautions: none  Exercises: MHP with all supine exercises this

## 2024-11-26 ENCOUNTER — HOSPITAL ENCOUNTER (OUTPATIENT)
Dept: PHYSICAL THERAPY | Age: 44
Setting detail: THERAPIES SERIES
Discharge: HOME | End: 2024-11-26
Attending: PHYSICIAN ASSISTANT
Payer: COMMERCIAL

## 2024-11-26 NOTE — FLOWSHEET NOTE
[facility]   Outpatient Rehabilitation & Therapy  [address]  P: [phone]   F: [phone]     Physical Therapy Cancel/No Show note    Date: 2024  Patient: Radhika Doty  : 1980  MRN: [id number]    Visit Count:   Cancels/No Shows to date:     For today's appointment patient:    [x]  Cancelled    [] Rescheduled appointment    [] No-show     Reason given by patient:    [x]  Patient ill    []  Conflicting appointment    [] No transportation      [] Conflict with work    [] No reason given    [] Weather related    [] COVID-19    [x] Other:      Comments:  per  patient states she will call back once she feels better to be put on schedule.       [] Next appointment was confirmed    Electronically signed by: Marli Toro PTA

## 2024-11-27 ENCOUNTER — HOSPITAL ENCOUNTER (OUTPATIENT)
Dept: GENERAL RADIOLOGY | Age: 44
Discharge: HOME | End: 2024-11-29
Payer: COMMERCIAL

## 2024-11-27 ENCOUNTER — HOSPITAL ENCOUNTER (OUTPATIENT)
Age: 44
Discharge: HOME | End: 2024-11-27
Payer: COMMERCIAL

## 2024-11-27 ENCOUNTER — OFFICE VISIT (OUTPATIENT)
Age: 44
End: 2024-11-27
Payer: COMMERCIAL

## 2024-11-27 ENCOUNTER — HOSPITAL ENCOUNTER (OUTPATIENT)
Age: 44
Discharge: HOME | End: 2024-11-29
Payer: COMMERCIAL

## 2024-11-27 VITALS
SYSTOLIC BLOOD PRESSURE: 126 MMHG | DIASTOLIC BLOOD PRESSURE: 87 MMHG | HEIGHT: 62 IN | OXYGEN SATURATION: 95 % | HEART RATE: 89 BPM | BODY MASS INDEX: 45.27 KG/M2 | TEMPERATURE: 98.3 F | WEIGHT: 246 LBS

## 2024-11-27 DIAGNOSIS — M25.59 PAIN IN OTHER JOINT: ICD-10-CM

## 2024-11-27 DIAGNOSIS — M19.90 INFLAMMATORY ARTHRITIS: ICD-10-CM

## 2024-11-27 DIAGNOSIS — M19.90 INFLAMMATORY ARTHRITIS: Primary | ICD-10-CM

## 2024-11-27 LAB — RHEUMATOID FACT SER NEPH-ACNC: <10 IU/ML (ref 0–13)

## 2024-11-27 PROCEDURE — G2211 COMPLEX E/M VISIT ADD ON: HCPCS | Performed by: STUDENT IN AN ORGANIZED HEALTH CARE EDUCATION/TRAINING PROGRAM

## 2024-11-27 PROCEDURE — 99204 OFFICE O/P NEW MOD 45 MIN: CPT | Performed by: STUDENT IN AN ORGANIZED HEALTH CARE EDUCATION/TRAINING PROGRAM

## 2024-11-27 PROCEDURE — 86431 RHEUMATOID FACTOR QUANT: CPT

## 2024-11-27 PROCEDURE — G8427 DOCREV CUR MEDS BY ELIG CLIN: HCPCS | Performed by: STUDENT IN AN ORGANIZED HEALTH CARE EDUCATION/TRAINING PROGRAM

## 2024-11-27 PROCEDURE — 86200 CCP ANTIBODY: CPT

## 2024-11-27 PROCEDURE — G8417 CALC BMI ABV UP PARAM F/U: HCPCS | Performed by: STUDENT IN AN ORGANIZED HEALTH CARE EDUCATION/TRAINING PROGRAM

## 2024-11-27 PROCEDURE — 73130 X-RAY EXAM OF HAND: CPT

## 2024-11-27 PROCEDURE — G8484 FLU IMMUNIZE NO ADMIN: HCPCS | Performed by: STUDENT IN AN ORGANIZED HEALTH CARE EDUCATION/TRAINING PROGRAM

## 2024-11-27 PROCEDURE — 1036F TOBACCO NON-USER: CPT | Performed by: STUDENT IN AN ORGANIZED HEALTH CARE EDUCATION/TRAINING PROGRAM

## 2024-11-27 PROCEDURE — 36415 COLL VENOUS BLD VENIPUNCTURE: CPT

## 2024-11-27 RX ORDER — PREDNISONE 5 MG/1
TABLET ORAL
Qty: 30 TABLET | Refills: 0 | Status: SHIPPED | OUTPATIENT
Start: 2024-11-27 | End: 2024-12-17

## 2024-11-27 ASSESSMENT — RHEUMATOLOGY NEW PATIENT QUESTIONNAIRE
AGITATION: N
ANEMIA: N
DIFFICULTY BREATHING LYING DOWN: N
JAUNDICE: N
SEIZURES: N
MUSCLE WEAKNESS: Y
COUGH: Y
BLACK STOOLS: N
BLOOD IN STOOLS: N
VAGINAL DRYNESS: N
ANXIETY: N
UNUSUAL FATIGUE: Y
PAIN OR BURNING ON URINATION: N
DIFFICULTY STAYING ASLEEP: Y
FAINTING: N
NUMBNESS OR TINGLING IN HANDS OR FEET: Y
UNEXPLAINED HEARING LOSS: Y
JOINT SWELLING: Y
EASY BRUISING: N
COLOR CHANGES OF HANDS OR FEET IN THE COLD: Y
SKIN TIGHTNESS: N
MORNING STIFFNESS: Y
NIGHT SWEATS: Y
SORES IN MOUTH OR NOSE: Y
HOW WOULD YOU DESCRIBE YOUR STIFFNESS ON AVERAGE: SEVERE
EYE REDNESS: N
EASILY LOSING TEMPER: N
STOMACH PAIN: Y
VOMITING OF BLOOD OR COFFEE GROUND CONSISTENCY MATERIAL: N
NAUSEA: Y
EYE DRYNESS: Y
LIST JOINTS AFFECTED BY SWELLING IN THE PAST MONTH: KNEES
DOUBLE OR BLURRED VISION: Y
DRYNESS OF MOUTH: Y
DEPRESSION: N
LOSS OF VISION: N
MORNING STIFFNESS IN LOWER BACK: Y
EXCESSIVE HAIR LOSS (MORE THAN YOUR NORM): Y
HEARTBURN OR REFLUX: Y
RASH: N
MEMORY LOSS: Y
DIFFICULTY FALLING ASLEEP: Y
PERSISTENT DIARRHEA: Y
SWOLLEN LEGS OR FEET: Y
CHEST PAIN: Y
INCREASED SUSCEPTIBILITY TO INFECTION: N
HEADACHES: Y
SKIN REDNESS: Y
UNEXPLAINED WEIGHT CHANGE: Y
FEVER: Y
RASH OR ULCERS: Y
DIFFICULTY SWALLOWING: Y
UNUSUALLY RAPID OR SLOWED HEART RATE: N
UNUSUAL BLEEDING: Y
NODULES/BUMPS: Y
LOSS OF CONSCIOUSNESS: N
EYE PAIN: N
SHORTNESS OF BREATH: Y
ABNORMAL URINE: N
SWOLLEN OR TENDER GLANDS: Y
HOARSE VOICE: Y
SUN SENSITIVE (SUN ALLERGY): Y
JOINT PAIN: Y
COUGHING OF BLOOD: N
BEHAVIORAL CHANGES: N
WHEEZING: N

## 2024-11-27 ASSESSMENT — ENCOUNTER SYMPTOMS: BACK PAIN: 1

## 2024-11-27 NOTE — PROGRESS NOTES
Review of Systems   Constitutional:  Positive for activity change, appetite change, diaphoresis, fatigue and fever.   Musculoskeletal:  Positive for arthralgias, back pain, gait problem, joint swelling, myalgias, neck pain and neck stiffness.   Psychiatric/Behavioral:  Positive for sleep disturbance.    All other systems reviewed and are negative.     
  Allergic/Immunologic    Increased susceptibility to infection No     Rheum New Patient Questionnaire-Medication History 1    Question 11/27/2024  9:07 AM EST - Filed by Patient   Have you taken medications for your rheumatological condition? None of the above   Have you been treated for osteoporosis or gout? None of these     
Head: Normocephalic and atraumatic.      Nose: Nose normal.      Mouth/Throat:      Mouth: Mucous membranes are moist.   Eyes:      Extraocular Movements: Extraocular movements intact.   Pulmonary:      Effort: Pulmonary effort is normal.   Musculoskeletal:         General: Swelling and tenderness present.      Cervical back: Normal range of motion.      Comments: R thumb and L 3 PIP tender on palpation  B/l 3rd PIP swollen  Unable to make a fist   Neurological:      Mental Status: She is alert.           LABORATORY DATA: Reviewed  Lab Results   Component Value Date    WBC 7.6 09/19/2024    HGB 13.1 09/19/2024    HCT 38.4 09/19/2024    MCV 87.6 09/19/2024     (H) 09/19/2024     09/19/2024    K 4.3 09/19/2024    CL 99 09/19/2024    CO2 27 09/19/2024    BUN 14 09/19/2024    CREATININE 0.6 09/19/2024    BILITOT 0.4 09/19/2024    ALKPHOS 90 09/19/2024    AST 16 09/19/2024    ALT 13 09/19/2024         Lab Results   Component Value Date    RBC 4.39 09/19/2024    HGB 13.1 09/19/2024    MCV 87.6 09/19/2024    MCH 29.9 09/19/2024    MCHC 34.1 09/19/2024    RDW 13.7 09/19/2024    MPV 6.9 09/19/2024    BASOPCT 1 09/19/2024    LYMPHSABS 2.00 09/19/2024    MONOSABS 0.50 09/19/2024    NEUTROABS 4.70 09/19/2024    EOSABS 0.20 09/19/2024    BASOSABS 0.10 09/19/2024       IMPRESSION: Ms. Doty is a 44 y.o. female with a medical history remarkable for vitiligo, ADHD, anxiety, Fibro (2011 in [place]) referred for evaluation of arthritis.    Assessment  1.  Joint pain  -Mainly involving bilateral hands, fingers, knees and shoulders    2.  Inflammatory arthritis  -Joint pain associated with morning stiffness lasting midday, joint swelling    Plan  -Patient's symptoms including: Joint pain, morning stiffness lasting greater than an hour, recalls dramatic response to oral steroids, swelling.  Exam findings including: PIP swelling, tender on palpation seem to align with inflammatory arthritis  -Will do prednisone

## 2024-11-29 LAB — CCP AB SER IA-ACNC: 1.9 U/ML (ref 0–7)

## 2024-12-14 ENCOUNTER — OFFICE VISIT (OUTPATIENT)
Dept: ORTHOPEDIC SURGERY | Age: 44
End: 2024-12-14
Payer: COMMERCIAL

## 2024-12-14 VITALS — HEIGHT: 62 IN | BODY MASS INDEX: 45.27 KG/M2 | RESPIRATION RATE: 20 BRPM | WEIGHT: 246 LBS

## 2024-12-14 DIAGNOSIS — G56.03 BILATERAL CARPAL TUNNEL SYNDROME: Primary | ICD-10-CM

## 2024-12-14 PROCEDURE — G8417 CALC BMI ABV UP PARAM F/U: HCPCS | Performed by: PHYSICIAN ASSISTANT

## 2024-12-14 PROCEDURE — G8484 FLU IMMUNIZE NO ADMIN: HCPCS | Performed by: PHYSICIAN ASSISTANT

## 2024-12-14 PROCEDURE — G8427 DOCREV CUR MEDS BY ELIG CLIN: HCPCS | Performed by: PHYSICIAN ASSISTANT

## 2024-12-14 PROCEDURE — 99213 OFFICE O/P EST LOW 20 MIN: CPT | Performed by: PHYSICIAN ASSISTANT

## 2024-12-14 PROCEDURE — 1036F TOBACCO NON-USER: CPT | Performed by: PHYSICIAN ASSISTANT

## 2024-12-14 NOTE — PROGRESS NOTES
Patient ID: Radhika Doty is a 44 y.o. female    Chief Compliant:  Chief Complaint   Patient presents with    Back Pain     Low Back Pain      HPI:  Patient is a 44 y.o. female who presents to the clinic today for follow up of carpal tunnel syndrome.  Patient is starting to experience more numbness and tingling down into her right hand as well.  On her last office visit with me on 10/17/2024 she was exhibiting symptoms of left carpal tunnel like symptoms with her first 3 digits of her left hand causing her to wake up at night with numbness and tingling and shake it out.  She has been using the wrist brace which does seem to provide significant relief.  She is requesting a right wrist brace today.      Review of Systems   Constitutional: Negative for fever, chills, sweats.   Eyes: Negative for changes in vision, or pain.   HENT: Negative for ear ache, epistaxis, or sore throat.  Respiratory/Cardio: Negative for Chest pain, palpitations, SOB, or cough.  Gastrointestinal: Negative for abdominal pain, N/V/D.   Genitourinary: Negative for dysuria, frequency, urgency, or hematuria.   Neurological: Negative for headache, numbness, or weakness.   Integumentary: Negative for rash, itching, laceration, or abrasion.   Musculoskeletal: Positive for Back Pain (Low Back Pain)         Past History:    Current Outpatient Medications:     methotrexate (RHEUMATREX) 2.5 MG chemo tablet, Take 5 tabs weekly, Disp: 20 tablet, Rfl: 3    folic acid (FOLVITE) 1 MG tablet, Take 1 tablet by mouth daily, Disp: 90 tablet, Rfl: 1    predniSONE (DELTASONE) 2.5 MG tablet, Take 2 tablets by mouth daily for 30 days, THEN 1 tablet daily., Disp: 120 tablet, Rfl: 0    celecoxib (CELEBREX) 200 MG capsule, TAKE 1 CAPSULE BY MOUTH 2 TIMES A DAY, Disp: 60 capsule, Rfl: 0    fluticasone (FLONASE) 50 MCG/ACT nasal spray, INSTILL 1 SPRAY IN EACH NOSTRIL DAILY., Disp: 32 g, Rfl: 0    loratadine (CLARITIN) 10 MG tablet, TAKE 1 TABLET BY MOUTH EVERY DAY,

## 2024-12-18 ENCOUNTER — OFFICE VISIT (OUTPATIENT)
Age: 44
End: 2024-12-18
Payer: COMMERCIAL

## 2024-12-18 VITALS
BODY MASS INDEX: 45.42 KG/M2 | DIASTOLIC BLOOD PRESSURE: 84 MMHG | TEMPERATURE: 97.3 F | OXYGEN SATURATION: 97 % | HEIGHT: 62 IN | HEART RATE: 86 BPM | SYSTOLIC BLOOD PRESSURE: 116 MMHG | WEIGHT: 246.8 LBS

## 2024-12-18 DIAGNOSIS — Z11.1 SCREENING-PULMONARY TB: ICD-10-CM

## 2024-12-18 DIAGNOSIS — Z79.52 LONG TERM SYSTEMIC STEROID USER: ICD-10-CM

## 2024-12-18 DIAGNOSIS — Z11.59 ENCOUNTER FOR SCREENING FOR VIRAL DISEASE: ICD-10-CM

## 2024-12-18 DIAGNOSIS — M19.90 INFLAMMATORY ARTHRITIS: Primary | ICD-10-CM

## 2024-12-18 DIAGNOSIS — M25.59 PAIN IN OTHER JOINT: ICD-10-CM

## 2024-12-18 DIAGNOSIS — Z79.899 LONG-TERM USE OF HIGH-RISK MEDICATION: ICD-10-CM

## 2024-12-18 PROCEDURE — 99213 OFFICE O/P EST LOW 20 MIN: CPT | Performed by: STUDENT IN AN ORGANIZED HEALTH CARE EDUCATION/TRAINING PROGRAM

## 2024-12-18 RX ORDER — FOLIC ACID 1 MG/1
1 TABLET ORAL DAILY
Qty: 90 TABLET | Refills: 1 | Status: SHIPPED | OUTPATIENT
Start: 2024-12-18

## 2024-12-18 RX ORDER — PREDNISONE 2.5 MG/1
TABLET ORAL
Qty: 120 TABLET | Refills: 0 | Status: SHIPPED | OUTPATIENT
Start: 2024-12-18 | End: 2025-03-18

## 2024-12-18 RX ORDER — METHOTREXATE 2.5 MG/1
TABLET ORAL
Qty: 20 TABLET | Refills: 3 | Status: SHIPPED | OUTPATIENT
Start: 2024-12-18

## 2024-12-18 ASSESSMENT — ENCOUNTER SYMPTOMS: BACK PAIN: 1

## 2024-12-18 NOTE — PROGRESS NOTES
Review of Systems   Constitutional:  Positive for activity change.   Musculoskeletal:  Positive for arthralgias, back pain, gait problem, joint swelling, myalgias, neck pain and neck stiffness.   All other systems reviewed and are negative.      
for follow-up today.  Her last visit was on 11/27/2024 and she was given prednisone challenge to assess response.  She was prescribed 10 Mg daily for a week followed by 5 Mg daily for a week.  Patient reports dramatic response when she was on steroids and reported feeling normally for the first time in 3 years.  She feels back to her usual self since she has been off of steroids now.    All her symptoms resolved when she was on steroid except some pain and tenderness in the right wrist for which she was told by other specialist that she has tendinitis and carpal tunnel R>L.  Discussed options of treatment and she was agreeable to start methotrexate.  Discussed the side effects in detail and answered all her questions.    Previous Rheumatology workup   11/27/2024:  RF, CCP: Negative  Bilateral hand XR: Normal    10/22/2024:  CRP: 16.1  ESR: 9, normal    Past Medical,Family, and Social History reviewed.  Patient's PMH/PSH,SH,PSYCH Hx, MEDs, ALLERGIES, and ROS were all reviewed and updated in the appropriate sections.    Family Hx:  2 Aunt: RA  Maternal GM: RA    PAST MEDICAL HISTORY:  Past Medical History:   Diagnosis Date    Arthritis     arthro both knees    Asthma     as a child & exercise induced    Borderline diabetes     Bursitis of hip     COVID-19 02/2023    Edema     Fibromyalgia     Hyperlipidemia     Low back pain     Osteoarthritis     PONV (postoperative nausea and vomiting)     itchy,vomit    Sciatica        Past Surgical History:   Procedure Laterality Date    COLONOSCOPY N/A 03/05/2024    COLONOSCOPY BIOPSY performed by Abhay Cote MD at [facility] OR    HERNIA REPAIR      KNEE ARTHROSCOPY Right 07/18/2023    KNEE ARTHROSCOPY  PARTIAL MEDIAL MENISCECTOMY   RIGHT performed by Epi Bui MD at [facility] OR    MENISCECTOMY      SKIN BIOPSY      2013,rt arm,negative    UMBILICAL HERNIA REPAIR      UPPER GASTROINTESTINAL ENDOSCOPY N/A 03/05/2024    ESOPHAGOGASTRODUODENOSCOPY BIOPSY performed by Abhay Cote

## 2024-12-18 NOTE — PATIENT INSTRUCTIONS
#Longterm use of methotrexate    - methotrexate (MTX) has multiple effects on the immune system, including inhibition of 1-fwsvumxhldtjrk-3-carboxamide ribonucleotide (AICAR) transformylase and dihydrofolate reductase leading to anti-inflammatory and anti-proliferative effects. MTX inhibition of AICAR transformylase stimulates the release of adenosine which is a tissue protective retaliatory metabolite with potent antiinflammatory properties. In addition, adenosine has multiple effects on the cardiovascular system (potent vasodilator, has negative inotropic and chronotropic effects, and downregulates vascular smooth muscle cell proliferation). It is likely via these cardioprotective mechanisms that MTX has been shown to have a preferential effect on cardiovascular mortality when compared with other DMARDs  - methotrexate has been shown to slow new erosions involved in joints.  - it can take 4-6 weeks after starting or adjusting the dose of MTX for the effects to be seen clinically  - adverse effects, including ulcers, anemia, hepatotoxicity, hair loss, nodulosis, and pulmonary toxicity, reviewed with patient  --- drug medication informational handout was provided to the patient  - Do not take Bactrim when you are on it methotexate  - Folic acid supplementation is also ordered  - Patient counseled to limit alcohol intake while on methotrexate  - Liver function panel and complete blood count ordered for every 3 months. Monitoring may be more frequent when titrating MTX dose.  - Teratogenicity of methotrexate discussed with patient

## 2024-12-29 DIAGNOSIS — M25.561 CHRONIC PAIN OF RIGHT KNEE: ICD-10-CM

## 2024-12-29 DIAGNOSIS — M54.50 LUMBAR BACK PAIN: ICD-10-CM

## 2024-12-29 DIAGNOSIS — M17.0 BILATERAL PRIMARY OSTEOARTHRITIS OF KNEE: ICD-10-CM

## 2024-12-29 DIAGNOSIS — Z98.890 S/P RIGHT KNEE ARTHROSCOPY: ICD-10-CM

## 2024-12-29 DIAGNOSIS — G89.29 CHRONIC PAIN OF RIGHT KNEE: ICD-10-CM

## 2025-01-02 ENCOUNTER — TELEPHONE (OUTPATIENT)
Age: 45
End: 2025-01-02

## 2025-01-02 RX ORDER — CYCLOBENZAPRINE HCL 10 MG
10 TABLET ORAL 3 TIMES DAILY PRN
Qty: 90 TABLET | Refills: 0 | Status: SHIPPED | OUTPATIENT
Start: 2025-01-02 | End: 2025-02-01

## 2025-01-02 NOTE — TELEPHONE ENCOUNTER
Pt called in stating is sick and is going to [facility] to be evaluated and wants to know if she should still take the methotraxate on Saturday.    Pt advised that after she started taking the Methotrexate she developed a cough. Pt then advised she is still coughing and is now having yellow and green discharge.     Per Dr. Cosme \" Please hold and once she gets discharged she can call the office to see if its okay to resume it \"    Attempted to call pt back and phone rang busy.

## 2025-01-03 NOTE — TELEPHONE ENCOUNTER
Patient was treated at hospital and was prescribed azithromycin and she wants to know if she can take that. She also got the message to stop taking Methotrexate until she is better.   Please advise.

## 2025-01-09 DIAGNOSIS — M17.0 BILATERAL PRIMARY OSTEOARTHRITIS OF KNEE: ICD-10-CM

## 2025-01-09 DIAGNOSIS — Z98.890 S/P RIGHT KNEE ARTHROSCOPY: ICD-10-CM

## 2025-01-09 DIAGNOSIS — M25.561 CHRONIC PAIN OF RIGHT KNEE: ICD-10-CM

## 2025-01-09 DIAGNOSIS — G89.29 CHRONIC PAIN OF RIGHT KNEE: ICD-10-CM

## 2025-01-13 ENCOUNTER — TELEPHONE (OUTPATIENT)
Dept: ORTHOPEDIC SURGERY | Age: 45
End: 2025-01-13

## 2025-01-13 DIAGNOSIS — M17.0 BILATERAL PRIMARY OSTEOARTHRITIS OF KNEE: Primary | ICD-10-CM

## 2025-01-13 RX ORDER — CELECOXIB 200 MG/1
200 CAPSULE ORAL 2 TIMES DAILY
Qty: 60 CAPSULE | Refills: 3 | Status: SHIPPED | OUTPATIENT
Start: 2025-01-13

## 2025-01-13 RX ORDER — CELECOXIB 200 MG/1
200 CAPSULE ORAL 2 TIMES DAILY
Qty: 60 CAPSULE | Refills: 0 | OUTPATIENT
Start: 2025-01-13

## 2025-01-13 NOTE — TELEPHONE ENCOUNTER
Pt called in stating that devin told her to call around this time to request PA for upcoming bilateral injections. Pt is also requiring abput her refill for celecoxib

## 2025-01-13 NOTE — TELEPHONE ENCOUNTER
Refill for Celebrex provided.  Patient would be a candidate for repeat Durolane injection after 2/1/25. Okay to submit for repeat prior-auth

## 2025-01-14 RX ORDER — CELECOXIB 200 MG/1
200 CAPSULE ORAL 2 TIMES DAILY
Qty: 60 CAPSULE | Refills: 0 | Status: SHIPPED | OUTPATIENT
Start: 2025-01-14

## 2025-01-17 ENCOUNTER — TELEPHONE (OUTPATIENT)
Age: 45
End: 2025-01-17

## 2025-01-17 NOTE — TELEPHONE ENCOUNTER
Pt called in, she thinks the medication is called methotrexate, she did not take it while she was sick and she is feeling better now and asking if she should take it? In her f/u appointment note methotrexate was prescribed. Writer told her the provider may reach out to her incase she has more questions and that she has left for the day.

## 2025-01-17 NOTE — TELEPHONE ENCOUNTER
Pt was called, spoke to provider through chat since she has left the office for today, and she wrote: She can resume it. On review, she was sick around the 2nd so it should be okay now as long as she is afebrile and feeling better. If she gets sick again given the season we are in , she can pause it again  Pt reports no fever just a lingering cough. She is aware she can pause again if she gets sick again.

## 2025-02-10 ENCOUNTER — TELEPHONE (OUTPATIENT)
Dept: ORTHOPEDIC SURGERY | Age: 45
End: 2025-02-10

## 2025-02-10 ENCOUNTER — OFFICE VISIT (OUTPATIENT)
Dept: ORTHOPEDIC SURGERY | Age: 45
End: 2025-02-10
Payer: COMMERCIAL

## 2025-02-10 ENCOUNTER — HOSPITAL ENCOUNTER (OUTPATIENT)
Age: 45
Discharge: HOME | End: 2025-02-10
Payer: COMMERCIAL

## 2025-02-10 VITALS — WEIGHT: 246 LBS | HEIGHT: 62 IN | RESPIRATION RATE: 18 BRPM | BODY MASS INDEX: 45.27 KG/M2

## 2025-02-10 DIAGNOSIS — Z11.59 ENCOUNTER FOR SCREENING FOR VIRAL DISEASE: ICD-10-CM

## 2025-02-10 DIAGNOSIS — M17.0 BILATERAL PRIMARY OSTEOARTHRITIS OF KNEE: Primary | ICD-10-CM

## 2025-02-10 DIAGNOSIS — Z11.1 SCREENING-PULMONARY TB: ICD-10-CM

## 2025-02-10 DIAGNOSIS — M19.90 INFLAMMATORY ARTHRITIS: ICD-10-CM

## 2025-02-10 DIAGNOSIS — Z98.890 S/P RIGHT KNEE ARTHROSCOPY: ICD-10-CM

## 2025-02-10 LAB
ALBUMIN SERPL-MCNC: 4.5 G/DL (ref 3.5–5.2)
ALP SERPL-CCNC: 100 U/L (ref 35–104)
ALT SERPL-CCNC: 20 U/L (ref 10–35)
ANION GAP SERPL CALCULATED.3IONS-SCNC: 10 MMOL/L (ref 9–16)
AST SERPL-CCNC: 19 U/L (ref 10–35)
BASOPHILS # BLD: 0.1 K/UL (ref 0–0.2)
BASOPHILS NFR BLD: 1 % (ref 0–2)
BILIRUB SERPL-MCNC: 0.4 MG/DL (ref 0–1.2)
BUN SERPL-MCNC: 11 MG/DL (ref 6–20)
CALCIUM SERPL-MCNC: 9.9 MG/DL (ref 8.6–10.4)
CHLORIDE SERPL-SCNC: 101 MMOL/L (ref 98–107)
CO2 SERPL-SCNC: 27 MMOL/L (ref 20–31)
CREAT SERPL-MCNC: 0.6 MG/DL (ref 0.7–1.2)
CRP SERPL HS-MCNC: 13.4 MG/L (ref 0–5)
EOSINOPHIL # BLD: 0.2 K/UL (ref 0–0.4)
EOSINOPHILS RELATIVE PERCENT: 2 % (ref 0–4)
ERYTHROCYTE [DISTWIDTH] IN BLOOD BY AUTOMATED COUNT: 14.4 % (ref 11.5–14.9)
ERYTHROCYTE [SEDIMENTATION RATE] IN BLOOD BY PHOTOMETRIC METHOD: 19 MM/HR (ref 0–20)
GFR, ESTIMATED: >90 ML/MIN/1.73M2
GLUCOSE SERPL-MCNC: 110 MG/DL (ref 74–99)
HBV CORE AB SER QL: NONREACTIVE
HBV SURFACE AB SERPL IA-ACNC: <3.5 MIU/ML
HBV SURFACE AG SERPL QL IA: NONREACTIVE
HCT VFR BLD AUTO: 40.2 % (ref 36–46)
HGB BLD-MCNC: 13.8 G/DL (ref 12–16)
LYMPHOCYTES NFR BLD: 1.8 K/UL (ref 1–4.8)
LYMPHOCYTES RELATIVE PERCENT: 22 % (ref 24–44)
MCH RBC QN AUTO: 29.9 PG (ref 26–34)
MCHC RBC AUTO-ENTMCNC: 34.3 G/DL (ref 31–37)
MCV RBC AUTO: 87 FL (ref 80–100)
MONOCYTES NFR BLD: 0.5 K/UL (ref 0.1–1.3)
MONOCYTES NFR BLD: 6 % (ref 1–7)
NEUTROPHILS NFR BLD: 69 % (ref 36–66)
NEUTS SEG NFR BLD: 5.7 K/UL (ref 1.3–9.1)
PLATELET # BLD AUTO: 466 K/UL (ref 150–450)
PMV BLD AUTO: 7.1 FL (ref 6–12)
POTASSIUM SERPL-SCNC: 4.2 MMOL/L (ref 3.7–5.3)
PROT SERPL-MCNC: 7.8 G/DL (ref 6.6–8.7)
RBC # BLD AUTO: 4.62 M/UL (ref 4–5.2)
SODIUM SERPL-SCNC: 138 MMOL/L (ref 136–145)
WBC OTHER # BLD: 8.4 K/UL (ref 3.5–11)

## 2025-02-10 PROCEDURE — 86480 TB TEST CELL IMMUN MEASURE: CPT

## 2025-02-10 PROCEDURE — 86317 IMMUNOASSAY INFECTIOUS AGENT: CPT

## 2025-02-10 PROCEDURE — 86704 HEP B CORE ANTIBODY TOTAL: CPT

## 2025-02-10 PROCEDURE — 86140 C-REACTIVE PROTEIN: CPT

## 2025-02-10 PROCEDURE — G8417 CALC BMI ABV UP PARAM F/U: HCPCS | Performed by: PHYSICIAN ASSISTANT

## 2025-02-10 PROCEDURE — 1036F TOBACCO NON-USER: CPT | Performed by: PHYSICIAN ASSISTANT

## 2025-02-10 PROCEDURE — 99213 OFFICE O/P EST LOW 20 MIN: CPT | Performed by: PHYSICIAN ASSISTANT

## 2025-02-10 PROCEDURE — 87340 HEPATITIS B SURFACE AG IA: CPT

## 2025-02-10 PROCEDURE — 85025 COMPLETE CBC W/AUTO DIFF WBC: CPT

## 2025-02-10 PROCEDURE — 85652 RBC SED RATE AUTOMATED: CPT

## 2025-02-10 PROCEDURE — G8428 CUR MEDS NOT DOCUMENT: HCPCS | Performed by: PHYSICIAN ASSISTANT

## 2025-02-10 PROCEDURE — 80053 COMPREHEN METABOLIC PANEL: CPT

## 2025-02-10 PROCEDURE — 36415 COLL VENOUS BLD VENIPUNCTURE: CPT

## 2025-02-10 ASSESSMENT — ENCOUNTER SYMPTOMS
VOMITING: 0
SHORTNESS OF BREATH: 0
COLOR CHANGE: 0
COUGH: 0

## 2025-02-10 NOTE — TELEPHONE ENCOUNTER
Lisa would like bilateral knee Durolane injections PA'd please.  (Request not filled out nor scanned in, Lisa verbally requested PA )

## 2025-02-10 NOTE — PROGRESS NOTES
[facility] ORTHOPEDICS  [address] [place] [address]  Dept: [phone]  Dept Fax: [phone]        Ambulatory Follow Up      Subjective:   Radhika Doty is a 44 y.o. year old female who presents to our office today for routine followup regarding her   1. Bilateral primary osteoarthritis of knee    2. S/P right knee arthroscopy    .    Chief Complaint   Patient presents with    Pain     Bilateral knee          History of Present Illness  The patient is a 44-year-old female here today for follow-up regarding bilateral knee pain. She was last seen on 09/19/2024 by Aniket Sahni for her knees.    She has previously consulted with Aniket Sahni for her knee issues and has undergone Durolane gel injections, which she reports as beneficial. However, cortisone injections were ineffective and resulted in elevated blood sugar levels and  causes sickness. She is currently on methotrexate for rheumatoid arthritis.     She reports that her right knee is more painful than the left. Approximately 1.5 weeks ago, she experienced a fall at work, during which she landed heavily on her right knee. The following day, she noticed significant swelling and bruising in the area. Despite applying ice, the pain persisted and walking became increasingly difficult. She also reports occasional popping sensations in her knee during ambulation.     She has lost 42 pounds, reducing her weight from ~270 to 246 pounds. She is unable to take over-the-counter Tylenol or ibuprofen due to a history of ulcers. She is currently taking Celebrex. She has previously consulted with Aniket Sahni for her knee issues and has undergone Durolane gel injections, which she reports as beneficial. She has a past history of meniscal issues. She has previously undergone a right knee arthroscopy in 2023  performed by Dr. Bui, which resulted in improved knee function.    SOCIAL HISTORY  She

## 2025-02-11 NOTE — PROGRESS NOTES
[facility]                Mir Sahni PA-C            [address], [address]               [place], [place] [address]           Dept Phone: [phone]           Dept Fax:  [phone] [address]                       [address]           [place], [place] [address]          Dept Phone: [phone]           Dept Fax:  [phone]      Chief Compliant:  Chief Complaint   Patient presents with    Knee Pain     Bilateral        History of Present Illness:  This is a 43 y.o. female who presents to the clinic today for evaluation of had concerns including Knee Pain (Bilateral).     Ms. Doty is a 43-year-old female returns today for reevaluation of chronic bilateral knee pain patient with known moderate osteoarthritis of both knees x-ray underwent a right knee arthroscopy partial medial meniscectomy in July 2023.  She unfortunately has had recurrent swelling and pain in this knee which has been moderated with Celebrex and physical therapy.  We are currently in the process of appealing approval for gel injections as I do believe patient would be a great candidate for these given that she continues to be painful and swollen despite therapy and injections in the past.    She was recommended to come in today by her physical therapist noticed significant increase in swelling in both knees over the last couple weeks.  Patient does not recall any specific injury or trauma but does note that with the swelling her pain has been progressively worsening. No knee joint warmth or redness       Past History:    Current Outpatient Medications:     VENTOLIN  (90 Base) MCG/ACT inhaler, INHALE 2 PUFFS BY MOUTH EVERY 4 (FOUR) HOURS AS NEEDED FOR SHORTNESS OF BREATH, Disp: , Rfl:     albuterol sulfate HFA (PROVENTIL;VENTOLIN;PROAIR) 108 (90 Base) MCG/ACT inhaler, Inhale 2 puffs into the lungs every 4 hours as needed, Disp: , Rfl:     albuterol (PROVENTIL) (2.5 MG/3ML)   Refill Decision Note   Joycemario Cano  is requesting a refill authorization.  Brief Assessment and Rationale for Refill:  Approve     Medication Therapy Plan:         Comments:     Note composed:3:42 AM 02/11/2025

## 2025-02-11 NOTE — TELEPHONE ENCOUNTER
Clinical noted faxed to  to initiate pre-cert.   Benefits Investigation Summary scanned into media.

## 2025-02-12 LAB
QUANTI TB GOLD PLUS: NEGATIVE
QUANTI TB1 MINUS NIL: 0.02 IU/ML
QUANTI TB2 MINUS NIL: 0.01 IU/ML
QUANTIFERON MITOGEN: 9.97 IU/ML
QUANTIFERON NIL: 0.03 IU/ML

## 2025-02-13 NOTE — TELEPHONE ENCOUNTER
PC to pt to let her know the Bilateral Durolane was approved from 25-25. VM was full so [employer] message was sent.  Durolane not set aside for this pt as there is no appt yet. Once one is scheduled please put name  and appt date on sticky and place on 2 boxes of Durolane. With the same lot # if possible.

## 2025-02-19 ENCOUNTER — OFFICE VISIT (OUTPATIENT)
Age: 45
End: 2025-02-19
Payer: COMMERCIAL

## 2025-02-19 VITALS
BODY MASS INDEX: 45.78 KG/M2 | HEIGHT: 62 IN | SYSTOLIC BLOOD PRESSURE: 134 MMHG | WEIGHT: 248.8 LBS | HEART RATE: 104 BPM | TEMPERATURE: 97.3 F | DIASTOLIC BLOOD PRESSURE: 77 MMHG | OXYGEN SATURATION: 97 %

## 2025-02-19 DIAGNOSIS — M19.90 INFLAMMATORY ARTHRITIS: Primary | ICD-10-CM

## 2025-02-19 DIAGNOSIS — M25.59 PAIN IN OTHER JOINT: ICD-10-CM

## 2025-02-19 DIAGNOSIS — Z79.899 LONG-TERM USE OF HIGH-RISK MEDICATION: ICD-10-CM

## 2025-02-19 PROCEDURE — G8427 DOCREV CUR MEDS BY ELIG CLIN: HCPCS | Performed by: STUDENT IN AN ORGANIZED HEALTH CARE EDUCATION/TRAINING PROGRAM

## 2025-02-19 PROCEDURE — 99214 OFFICE O/P EST MOD 30 MIN: CPT | Performed by: STUDENT IN AN ORGANIZED HEALTH CARE EDUCATION/TRAINING PROGRAM

## 2025-02-19 PROCEDURE — G8417 CALC BMI ABV UP PARAM F/U: HCPCS | Performed by: STUDENT IN AN ORGANIZED HEALTH CARE EDUCATION/TRAINING PROGRAM

## 2025-02-19 PROCEDURE — G2211 COMPLEX E/M VISIT ADD ON: HCPCS | Performed by: STUDENT IN AN ORGANIZED HEALTH CARE EDUCATION/TRAINING PROGRAM

## 2025-02-19 PROCEDURE — 99213 OFFICE O/P EST LOW 20 MIN: CPT | Performed by: STUDENT IN AN ORGANIZED HEALTH CARE EDUCATION/TRAINING PROGRAM

## 2025-02-19 PROCEDURE — 1036F TOBACCO NON-USER: CPT | Performed by: STUDENT IN AN ORGANIZED HEALTH CARE EDUCATION/TRAINING PROGRAM

## 2025-02-19 RX ORDER — METHOTREXATE 2.5 MG/1
TABLET ORAL
Qty: 28 TABLET | Refills: 3 | Status: SHIPPED | OUTPATIENT
Start: 2025-02-19

## 2025-02-19 NOTE — PROGRESS NOTES
Active and recent treatments: Seronegative inflammatory arthritis, methotrexate 7 tabs weekly (increased from 5 tabs to 7 tabs on 2/19/2025) and daily folic acid      INTERVAL HISTORY:  Patient presented for follow-up today.  She has been doing well on methotrexate 5 tabs weekly and denies any side effects.  Patient reports that all her symptoms were resolved including the pain, swelling and numbness and tingling although she has been taking prednisone 2.5 Mg daily.  She ran out of the prednisone approximately a day ago and will assess how she does on methotrexate.  She has been taking folic acid with it every day.    Patient reports that she fell on her knee and since then her knee seems to be hurting.  She has an upcoming appointment to get hyaluronic acid injection on February 26.  Since then she also noticed pain in bilateral wrists and hands swelling coming back.  She was also concerned about bilateral facial rash.  It seems to be a redness that she gets on bilateral cheeks usually happens at night hours around 8-8 30 after she gets home and feels almost like a burning sensation.  She has an upcoming appointment with her dermatologist at [facility].      SUMMARY OF CASE/HISTORY: Ms.Jennay Doty is a 44 y.o. female with a medical history remarkable for vitiligo, ADHD, anxiety, Fibro (2011 in [place]) initially evaluated on 11/27/2024 for arthritis.     Patient reports she has history of osteoarthritis in the knees and gets viscosupplementation injections to orthopedics for them.  This seemed to help but she continues to have sharp pain on her medial knees which does not seem to be relieved by anything.     Patient reports that she has had intermittent joint pain for some time now but has noticed worsening joint pain recently.  Her hands and fingers seem to be the worst.  She has noticed pain traveling all the way up from her hands to her shoulders and she wakes up in the middle of night due to hand pain and

## 2025-02-19 NOTE — PROGRESS NOTES
Review of Systems   Musculoskeletal:  Positive for arthralgias, gait problem and joint swelling.   All other systems reviewed and are negative.

## 2025-02-21 ENCOUNTER — TELEPHONE (OUTPATIENT)
Age: 45
End: 2025-02-21

## 2025-02-21 NOTE — TELEPHONE ENCOUNTER
Writer called Sha back and let him know Pt confirms that she is no longer taking Celebrex. She verbally understands there is a drug interaction between them per Sha the intern at [facility] pharmacy. Advised her if she had any other questions/concerns about this she could ask the pharmacy. Pt agreed.

## 2025-02-21 NOTE — TELEPHONE ENCOUNTER
Sha a pharmacist intern called about the methotrexate. He wanted to make sure you were aware that Celebrex and methotrexate have a drug interaction before he fills the medication. Celebrex increases the concentration of methotrexate in the body. Writer let him know will send provider a chat but she has left the office for the day.     Sha [phone]

## 2025-02-21 NOTE — TELEPHONE ENCOUNTER
Pt was called and she confirmed she is no longer taking the Celebrex. She appreciates the phone call.

## 2025-02-26 ENCOUNTER — OFFICE VISIT (OUTPATIENT)
Dept: ORTHOPEDIC SURGERY | Age: 45
End: 2025-02-26

## 2025-02-26 VITALS — HEIGHT: 62 IN | WEIGHT: 248 LBS | BODY MASS INDEX: 45.64 KG/M2 | RESPIRATION RATE: 14 BRPM

## 2025-02-26 DIAGNOSIS — M17.0 BILATERAL PRIMARY OSTEOARTHRITIS OF KNEE: Primary | ICD-10-CM

## 2025-02-26 DIAGNOSIS — G89.29 CHRONIC PAIN OF BOTH KNEES: ICD-10-CM

## 2025-02-26 DIAGNOSIS — M25.561 CHRONIC PAIN OF BOTH KNEES: ICD-10-CM

## 2025-02-26 DIAGNOSIS — M25.562 CHRONIC PAIN OF BOTH KNEES: ICD-10-CM

## 2025-02-26 RX ORDER — BUPIVACAINE HYDROCHLORIDE 2.5 MG/ML
2 INJECTION, SOLUTION INFILTRATION; PERINEURAL ONCE
Status: COMPLETED | OUTPATIENT
Start: 2025-02-26 | End: 2025-02-26

## 2025-02-26 RX ADMIN — BUPIVACAINE HYDROCHLORIDE 5 MG: 2.5 INJECTION, SOLUTION INFILTRATION; PERINEURAL at 14:29

## 2025-02-26 ASSESSMENT — ENCOUNTER SYMPTOMS
VOMITING: 0
SHORTNESS OF BREATH: 0
COUGH: 0
COLOR CHANGE: 0

## 2025-02-26 NOTE — PROGRESS NOTES
[facility] ORTHOPEDICS  [address] [place], [place] [id number]  Dept: [phone]  Dept Fax: [phone]        Ambulatory Follow Up      Subjective:   Radhika Doty is a 44 y.o. year old female who presents to our office today for routine followup regarding her   1. Bilateral primary osteoarthritis of knee    2. Chronic pain of both knees    .    Chief Complaint   Patient presents with    Knee Pain     FU: B/L knee pain, here for Durolane injections     History of Present Illness  The patient is a 44-year-old female who presents today for insurance-approved bilateral knee Durolane viscosupplemental injections. She was last evaluated on 02/10/2025, and the authorization was placed at that time. The patient is unable to take NSAID medications due to a history of GI ulcers. She also had prior corticosteroid injections completed by Aniket Sahni PA-C, but the injections were ineffective and resulted in hyperglycemia.    She reports persistent swelling in her left knee, which she has not opted to have drained. She has not experienced any recent trauma or falls since her last visit. She recalls a previous treatment where the numbing medication was not administered prior to the gel injection, resulting in significant discomfort. She expresses a preference for the current method, which includes the numbing medication, as it alleviates the pain associated with the procedure. She also mentions a previous course of physical therapy that lasted a year, but it was not recent. She is considering another round of physical therapy, particularly for her left knee, as she underwent therapy for her right knee during the last session.    ALLERGIES  The patient has no known allergies.    MEDICATIONS  Current: Cal Ernstaine      Review of Systems   Constitutional:  Negative for activity change and fever.   HENT:  Negative for sneezing.    Respiratory:  Negative

## 2025-03-13 DIAGNOSIS — M54.50 LUMBAR BACK PAIN: ICD-10-CM

## 2025-03-13 RX ORDER — LORATADINE 10 MG/1
10 TABLET ORAL DAILY
Qty: 30 TABLET | Refills: 0 | Status: SHIPPED | OUTPATIENT
Start: 2025-03-13

## 2025-03-13 RX ORDER — CYCLOBENZAPRINE HCL 10 MG
10 TABLET ORAL 3 TIMES DAILY PRN
Qty: 90 TABLET | Refills: 3 | Status: SHIPPED | OUTPATIENT
Start: 2025-03-13 | End: 2025-06-11

## 2025-04-13 SDOH — HEALTH STABILITY: PHYSICAL HEALTH: ON AVERAGE, HOW MANY MINUTES DO YOU ENGAGE IN EXERCISE AT THIS LEVEL?: 10 MIN

## 2025-04-13 SDOH — HEALTH STABILITY: PHYSICAL HEALTH: ON AVERAGE, HOW MANY DAYS PER WEEK DO YOU ENGAGE IN MODERATE TO STRENUOUS EXERCISE (LIKE A BRISK WALK)?: 7 DAYS

## 2025-04-28 ENCOUNTER — HOSPITAL ENCOUNTER (OUTPATIENT)
Age: 45
Setting detail: SPECIMEN
Discharge: HOME | End: 2025-04-28
Payer: COMMERCIAL

## 2025-04-28 ENCOUNTER — OFFICE VISIT (OUTPATIENT)
Dept: ORTHOPEDIC SURGERY | Age: 45
End: 2025-04-28
Payer: COMMERCIAL

## 2025-04-28 ENCOUNTER — TELEMEDICINE (OUTPATIENT)
Age: 45
End: 2025-04-28
Payer: COMMERCIAL

## 2025-04-28 ENCOUNTER — RESULTS FOLLOW-UP (OUTPATIENT)
Age: 45
End: 2025-04-28

## 2025-04-28 ENCOUNTER — TELEPHONE (OUTPATIENT)
Age: 45
End: 2025-04-28

## 2025-04-28 VITALS — BODY MASS INDEX: 46.12 KG/M2 | WEIGHT: 250.6 LBS | RESPIRATION RATE: 14 BRPM | HEIGHT: 62 IN

## 2025-04-28 DIAGNOSIS — M19.90 INFLAMMATORY ARTHRITIS: ICD-10-CM

## 2025-04-28 DIAGNOSIS — Z79.899 LONG-TERM USE OF HIGH-RISK MEDICATION: ICD-10-CM

## 2025-04-28 DIAGNOSIS — M22.42 CHONDROMALACIA OF BOTH PATELLAE: ICD-10-CM

## 2025-04-28 DIAGNOSIS — M06.00 SERONEGATIVE RHEUMATOID ARTHRITIS (HCC): Primary | ICD-10-CM

## 2025-04-28 DIAGNOSIS — M25.59 PAIN IN OTHER JOINT: ICD-10-CM

## 2025-04-28 DIAGNOSIS — Z79.52 LONG TERM SYSTEMIC STEROID USER: ICD-10-CM

## 2025-04-28 DIAGNOSIS — M17.0 BILATERAL PRIMARY OSTEOARTHRITIS OF KNEE: Primary | ICD-10-CM

## 2025-04-28 DIAGNOSIS — M22.41 CHONDROMALACIA OF BOTH PATELLAE: ICD-10-CM

## 2025-04-28 LAB
ALBUMIN SERPL-MCNC: 4.4 G/DL (ref 3.5–5.2)
ALP SERPL-CCNC: 105 U/L (ref 35–104)
ALT SERPL-CCNC: 30 U/L (ref 10–35)
ANION GAP SERPL CALCULATED.3IONS-SCNC: 9 MMOL/L (ref 9–16)
AST SERPL-CCNC: 27 U/L (ref 10–35)
BASOPHILS # BLD: 0.1 K/UL (ref 0–0.2)
BASOPHILS NFR BLD: 1 % (ref 0–2)
BILIRUB SERPL-MCNC: 0.2 MG/DL (ref 0–1.2)
BUN SERPL-MCNC: 11 MG/DL (ref 6–20)
CALCIUM SERPL-MCNC: 9.6 MG/DL (ref 8.6–10.4)
CHLORIDE SERPL-SCNC: 101 MMOL/L (ref 98–107)
CO2 SERPL-SCNC: 25 MMOL/L (ref 20–31)
CREAT SERPL-MCNC: 0.6 MG/DL (ref 0.7–1.2)
CRP SERPL HS-MCNC: 17.6 MG/L (ref 0–5)
EOSINOPHIL # BLD: 0.2 K/UL (ref 0–0.4)
EOSINOPHILS RELATIVE PERCENT: 3 % (ref 0–4)
ERYTHROCYTE [DISTWIDTH] IN BLOOD BY AUTOMATED COUNT: 15.1 % (ref 11.5–14.9)
ERYTHROCYTE [SEDIMENTATION RATE] IN BLOOD BY PHOTOMETRIC METHOD: 30 MM/HR (ref 0–20)
GFR, ESTIMATED: >90 ML/MIN/1.73M2
GLUCOSE SERPL-MCNC: 127 MG/DL (ref 74–99)
HCT VFR BLD AUTO: 38.8 % (ref 36–46)
HGB BLD-MCNC: 13.2 G/DL (ref 12–16)
LYMPHOCYTES NFR BLD: 2.2 K/UL (ref 1–4.8)
LYMPHOCYTES RELATIVE PERCENT: 27 % (ref 24–44)
MCH RBC QN AUTO: 29.6 PG (ref 26–34)
MCHC RBC AUTO-ENTMCNC: 33.9 G/DL (ref 31–37)
MCV RBC AUTO: 87.3 FL (ref 80–100)
MONOCYTES NFR BLD: 0.5 K/UL (ref 0.1–1.3)
MONOCYTES NFR BLD: 6 % (ref 1–7)
NEUTROPHILS NFR BLD: 63 % (ref 36–66)
NEUTS SEG NFR BLD: 5.1 K/UL (ref 1.3–9.1)
PLATELET # BLD AUTO: 459 K/UL (ref 150–450)
PMV BLD AUTO: 6.7 FL (ref 6–12)
POTASSIUM SERPL-SCNC: 4.4 MMOL/L (ref 3.7–5.3)
PROT SERPL-MCNC: 7.9 G/DL (ref 6.6–8.7)
RBC # BLD AUTO: 4.45 M/UL (ref 4–5.2)
SODIUM SERPL-SCNC: 135 MMOL/L (ref 136–145)
WBC OTHER # BLD: 8 K/UL (ref 3.5–11)

## 2025-04-28 PROCEDURE — G2211 COMPLEX E/M VISIT ADD ON: HCPCS | Performed by: STUDENT IN AN ORGANIZED HEALTH CARE EDUCATION/TRAINING PROGRAM

## 2025-04-28 PROCEDURE — G8417 CALC BMI ABV UP PARAM F/U: HCPCS | Performed by: STUDENT IN AN ORGANIZED HEALTH CARE EDUCATION/TRAINING PROGRAM

## 2025-04-28 PROCEDURE — G8427 DOCREV CUR MEDS BY ELIG CLIN: HCPCS | Performed by: STUDENT IN AN ORGANIZED HEALTH CARE EDUCATION/TRAINING PROGRAM

## 2025-04-28 PROCEDURE — 83516 IMMUNOASSAY NONANTIBODY: CPT

## 2025-04-28 PROCEDURE — 1036F TOBACCO NON-USER: CPT | Performed by: PHYSICIAN ASSISTANT

## 2025-04-28 PROCEDURE — 1036F TOBACCO NON-USER: CPT | Performed by: STUDENT IN AN ORGANIZED HEALTH CARE EDUCATION/TRAINING PROGRAM

## 2025-04-28 PROCEDURE — 85652 RBC SED RATE AUTOMATED: CPT

## 2025-04-28 PROCEDURE — G8427 DOCREV CUR MEDS BY ELIG CLIN: HCPCS | Performed by: PHYSICIAN ASSISTANT

## 2025-04-28 PROCEDURE — 99214 OFFICE O/P EST MOD 30 MIN: CPT | Performed by: STUDENT IN AN ORGANIZED HEALTH CARE EDUCATION/TRAINING PROGRAM

## 2025-04-28 PROCEDURE — G8417 CALC BMI ABV UP PARAM F/U: HCPCS | Performed by: PHYSICIAN ASSISTANT

## 2025-04-28 PROCEDURE — 86235 NUCLEAR ANTIGEN ANTIBODY: CPT

## 2025-04-28 PROCEDURE — 36415 COLL VENOUS BLD VENIPUNCTURE: CPT

## 2025-04-28 PROCEDURE — 99214 OFFICE O/P EST MOD 30 MIN: CPT | Performed by: PHYSICIAN ASSISTANT

## 2025-04-28 PROCEDURE — 85025 COMPLETE CBC W/AUTO DIFF WBC: CPT

## 2025-04-28 PROCEDURE — 86140 C-REACTIVE PROTEIN: CPT

## 2025-04-28 PROCEDURE — 80053 COMPREHEN METABOLIC PANEL: CPT

## 2025-04-28 RX ORDER — METHOTREXATE 2.5 MG/1
TABLET ORAL
Qty: 28 TABLET | Refills: 3 | Status: SHIPPED | OUTPATIENT
Start: 2025-04-28

## 2025-04-28 RX ORDER — ADALIMUMAB 40MG/0.4ML
40 KIT SUBCUTANEOUS
Qty: 0.8 ML | Refills: 11 | Status: ACTIVE | OUTPATIENT
Start: 2025-04-28 | End: 2026-03-17

## 2025-04-28 ASSESSMENT — ENCOUNTER SYMPTOMS
VOMITING: 0
COUGH: 0
COLOR CHANGE: 0
SHORTNESS OF BREATH: 0

## 2025-04-28 NOTE — PROGRESS NOTES
Documentation:    Total Time: minutes: 11-20 minutes    Radhika Doty was evaluated through a synchronous (real-time) video encounter. Patient identification was verified at the start of the visit. He/She (or guardian if applicable) is aware that this is a billable service, which includes applicable co-pays. This visit was conducted with the patient's (and/or legal guardian's) verbal consent.   The patient was located at Home: [address] [place] [id number].  The provider was located at Facility (Appt Dept): [address] [place] [id number].  Confirm you are appropriately licensed, registered, or certified to deliver care in the state where the patient is located as indicated above. If you are not or unsure, please re-schedule the visit: Yes, I confirm.      Active and recent treatments: Seronegative inflammatory arthritis/seronegative rheumatoid arthritis, methotrexate 7 tabs weekly (increased from 5 tabs to 7 tabs on 2/19/2025) and daily folic acid    Lack of disease control- Humira ordered on 4/28/2025    INTERVAL HISTORY:  Patient had video visit for follow-up today (had to add on her during break due to flare).  Patient was concerned that her symptoms were not under control despite being on increased dose of methotrexate 7 tabs weekly and wanted to discuss further interventions.  Patient mentions that her hands feel swollen, numb and painful all the time.  It has been extremely difficult for her to stand and walk because of extreme pain and swelling.    She denies any side effects to methotrexate 7 tabs weekly and daily folic acid.    SUMMARY OF CASE/HISTORY: Ms.Jennay Doty is a 44 y.o. female with a medical history remarkable for vitiligo, ADHD, anxiety, Fibro (2011 in [place]) initially evaluated on 11/27/2024 for arthritis.     Patient reports she has history of osteoarthritis in the knees and gets viscosupplementation injections to orthopedics for them.  This seemed to help but

## 2025-04-28 NOTE — TELEPHONE ENCOUNTER
Writer called Pt, left a brief vm, sending her a [employer] message with scheduling link, did cancel may appt. Per provider

## 2025-04-28 NOTE — TELEPHONE ENCOUNTER
----- Message from Dr. Harris Cosme MD sent at 4/28/2025 12:19 PM EDT -----  Please reschedule her appointment.  We can cancel her appointment in May and have her come back in late June.  Patient said that she would like to call for rescheduling the appointment.

## 2025-04-28 NOTE — TELEPHONE ENCOUNTER
Pt stopped in and would like to discuss methotrexate, Pt says \"medication is not working\" \"Hands are still swelling and numb, stiffness in legs\" Pt would like to know if she can try something else? Pt still getting \"redness on face\"   Pt said she has been taking as prescribed. [facility]    Pt would like to be seen sooner than 5/19, pt plans on getting labs done today     Please advise, ty

## 2025-04-28 NOTE — TELEPHONE ENCOUNTER
Writer called and Pt can do a vv at noon. Pt \"thanks us\"  Pt wants a link sent, will update schedule

## 2025-04-28 NOTE — PROGRESS NOTES
[facility] ORTHOPED[facility]  [address] [place], [place] [address]  Dept: [phone]  Dept Fax: [phone]        Ambulatory Follow Up      Subjective:   Radhika Doty is a 44 y.o. year old female who presents to our office today for routine followup regarding her   1. Bilateral primary osteoarthritis of knee    2. Chondromalacia of both patellae        Chief Complaint   Patient presents with    Follow-up    Knee Pain     F/u bilateral knee pain, injection on 2/28/25, still having relief in right knee, 50% of relief in left knee       History of Present Illness  The patient is a 44-year-old female here today for a follow-up regarding chronic bilateral knee pain. She received bilateral knee Durolane injections on 02/26/2025, administered by me. She reports 100% relief in her right knee but only 50% relief in the left. She is here today for a 6-week follow-up after the injections.    Her right knee is currently swollen but remains pain-free. However, she experiences persistent dull, achy pain in her left knee, occasionally accompanied by sharp, pulling sensations. Despite her intention to engage in physical therapy, she has been unable to do so due to time constraints related to work and caring for new puppies. She expresses a desire to start physical therapy once her puppies are rehomed, as she has been experiencing increased stiffness, which she attributes to an increase in her usual activities.    She also mentions that she is concurrently managing her rheumatoid arthritis with [facility] . She plans to consult with her rheumatologist regarding a change in medication, as her current medication appears ineffective. Swelling and numbness in her hands were reported, which resolved approximately 30 minutes prior to the consultation. Different medications are being trialed under the supervision of her rheumatologist. The most recent

## 2025-04-29 LAB
ENA SCL70 AB SER IA-ACNC: 0.9 U/ML
ENA SM AB SER-ACNC: 0.9 U/ML
ENA SS-A 60KD AB SER-ACNC: <0.5 U/ML
ENA SS-A IGG SER QL: 0.5 U/ML
ENA SS-A IGG SER QL: <0.7 U/ML
ENA SS-B IGG SER IA-ACNC: <0.3 U/ML
RIBOSOMAL P AB SER-ACNC: <1.9 U/ML
RNAP III IGG SERPL IA-ACNC: <0.7 U/ML
U1 SNRNP IGG SER IA-ACNC: 3.5 U/ML

## 2025-05-02 LAB
SEND OUT REPORT: NORMAL
TEST NAME: NORMAL

## 2025-05-16 ENCOUNTER — HOSPITAL ENCOUNTER (EMERGENCY)
Dept: VASCULAR LAB | Age: 45
Discharge: HOME | End: 2025-05-18
Payer: COMMERCIAL

## 2025-05-16 ENCOUNTER — APPOINTMENT (OUTPATIENT)
Dept: GENERAL RADIOLOGY | Age: 45
End: 2025-05-16
Payer: COMMERCIAL

## 2025-05-16 ENCOUNTER — HOSPITAL ENCOUNTER (EMERGENCY)
Age: 45
Discharge: HOME | End: 2025-05-16
Attending: EMERGENCY MEDICINE
Payer: COMMERCIAL

## 2025-05-16 VITALS
SYSTOLIC BLOOD PRESSURE: 154 MMHG | BODY MASS INDEX: 45.64 KG/M2 | RESPIRATION RATE: 18 BRPM | HEART RATE: 100 BPM | OXYGEN SATURATION: 98 % | TEMPERATURE: 97.8 F | DIASTOLIC BLOOD PRESSURE: 100 MMHG | HEIGHT: 62 IN | WEIGHT: 248 LBS

## 2025-05-16 DIAGNOSIS — L03.113 CELLULITIS OF RIGHT UPPER EXTREMITY: Primary | ICD-10-CM

## 2025-05-16 DIAGNOSIS — R06.09 DYSPNEA ON EXERTION: ICD-10-CM

## 2025-05-16 LAB
ALBUMIN SERPL-MCNC: 4.3 G/DL (ref 3.5–5.2)
ALP SERPL-CCNC: 90 U/L (ref 35–104)
ALT SERPL-CCNC: 14 U/L (ref 10–35)
ANION GAP SERPL CALCULATED.3IONS-SCNC: 13 MMOL/L (ref 9–16)
AST SERPL-CCNC: 20 U/L (ref 10–35)
BASOPHILS # BLD: 0.1 K/UL (ref 0–0.2)
BASOPHILS NFR BLD: 1 % (ref 0–2)
BILIRUB SERPL-MCNC: 0.4 MG/DL (ref 0–1.2)
BNP SERPL-MCNC: 49 PG/ML (ref 0–300)
BUN SERPL-MCNC: 10 MG/DL (ref 6–20)
CALCIUM SERPL-MCNC: 9.5 MG/DL (ref 8.6–10.4)
CHLORIDE SERPL-SCNC: 103 MMOL/L (ref 98–107)
CO2 SERPL-SCNC: 23 MMOL/L (ref 20–31)
CREAT SERPL-MCNC: 0.7 MG/DL (ref 0.7–1.2)
EKG ATRIAL RATE: 93 BPM
EKG P AXIS: 43 DEGREES
EKG P-R INTERVAL: 160 MS
EKG Q-T INTERVAL: 364 MS
EKG QRS DURATION: 76 MS
EKG QTC CALCULATION (BAZETT): 452 MS
EKG R AXIS: -1 DEGREES
EKG T AXIS: 10 DEGREES
EKG VENTRICULAR RATE: 93 BPM
EOSINOPHIL # BLD: 0.2 K/UL (ref 0–0.4)
EOSINOPHILS RELATIVE PERCENT: 2 % (ref 0–4)
ERYTHROCYTE [DISTWIDTH] IN BLOOD BY AUTOMATED COUNT: 14.8 % (ref 11.5–14.9)
GFR, ESTIMATED: >90 ML/MIN/1.73M2
GLUCOSE SERPL-MCNC: 99 MG/DL (ref 74–99)
HCG SERPL QL: NEGATIVE
HCT VFR BLD AUTO: 40.5 % (ref 36–46)
HGB BLD-MCNC: 13.4 G/DL (ref 12–16)
LYMPHOCYTES NFR BLD: 2.6 K/UL (ref 1–4.8)
LYMPHOCYTES RELATIVE PERCENT: 35 % (ref 24–44)
MCH RBC QN AUTO: 29 PG (ref 26–34)
MCHC RBC AUTO-ENTMCNC: 33.1 G/DL (ref 31–37)
MCV RBC AUTO: 87.5 FL (ref 80–100)
MONOCYTES NFR BLD: 0.4 K/UL (ref 0.1–1.3)
MONOCYTES NFR BLD: 5 % (ref 1–7)
NEUTROPHILS NFR BLD: 57 % (ref 36–66)
NEUTS SEG NFR BLD: 4.1 K/UL (ref 1.3–9.1)
PLATELET # BLD AUTO: 427 K/UL (ref 150–450)
PMV BLD AUTO: 7.3 FL (ref 6–12)
POTASSIUM SERPL-SCNC: 3.6 MMOL/L (ref 3.7–5.3)
PROT SERPL-MCNC: 8 G/DL (ref 6.6–8.7)
RBC # BLD AUTO: 4.63 M/UL (ref 4–5.2)
SODIUM SERPL-SCNC: 139 MMOL/L (ref 136–145)
TROPONIN I SERPL HS-MCNC: <6 NG/L (ref 0–14)
TROPONIN I SERPL HS-MCNC: <6 NG/L (ref 0–14)
WBC OTHER # BLD: 7.4 K/UL (ref 3.5–11)

## 2025-05-16 PROCEDURE — 80053 COMPREHEN METABOLIC PANEL: CPT

## 2025-05-16 PROCEDURE — 93005 ELECTROCARDIOGRAM TRACING: CPT | Performed by: PHYSICIAN ASSISTANT

## 2025-05-16 PROCEDURE — 71045 X-RAY EXAM CHEST 1 VIEW: CPT

## 2025-05-16 PROCEDURE — 36415 COLL VENOUS BLD VENIPUNCTURE: CPT

## 2025-05-16 PROCEDURE — 99285 EMERGENCY DEPT VISIT HI MDM: CPT

## 2025-05-16 PROCEDURE — 85025 COMPLETE CBC W/AUTO DIFF WBC: CPT

## 2025-05-16 PROCEDURE — 6370000000 HC RX 637 (ALT 250 FOR IP): Performed by: PHYSICIAN ASSISTANT

## 2025-05-16 PROCEDURE — 83880 ASSAY OF NATRIURETIC PEPTIDE: CPT

## 2025-05-16 PROCEDURE — 93971 EXTREMITY STUDY: CPT

## 2025-05-16 PROCEDURE — 84484 ASSAY OF TROPONIN QUANT: CPT

## 2025-05-16 PROCEDURE — 84703 CHORIONIC GONADOTROPIN ASSAY: CPT

## 2025-05-16 RX ORDER — CEPHALEXIN 500 MG/1
500 CAPSULE ORAL 4 TIMES DAILY
Qty: 28 CAPSULE | Refills: 0 | Status: SHIPPED | OUTPATIENT
Start: 2025-05-16 | End: 2025-05-23

## 2025-05-16 RX ORDER — POTASSIUM CHLORIDE 1500 MG/1
20 TABLET, EXTENDED RELEASE ORAL ONCE
Status: COMPLETED | OUTPATIENT
Start: 2025-05-16 | End: 2025-05-16

## 2025-05-16 RX ADMIN — POTASSIUM CHLORIDE 20 MEQ: 1500 TABLET, EXTENDED RELEASE ORAL at 14:41

## 2025-05-16 ASSESSMENT — PAIN - FUNCTIONAL ASSESSMENT: PAIN_FUNCTIONAL_ASSESSMENT: 0-10

## 2025-05-16 ASSESSMENT — HEART SCORE: ECG: NORMAL

## 2025-05-16 ASSESSMENT — PAIN SCALES - GENERAL: PAINLEVEL_OUTOF10: 3

## 2025-05-16 NOTE — DISCHARGE INSTRUCTIONS
Please follow-up with your rheumatologist and primary care physician.  Recommend return to the ED if you develop any worsening redness, swelling, pain over the arm, fevers or chills, worsening shortness of breath, cough, coughing up blood, chest pain, palpitations, dizziness or passing out, abdominal pain, vomiting or any other concerning symptoms.  
No

## 2025-05-16 NOTE — ED PROVIDER NOTES
EMERGENCY DEPARTMENT ENCOUNTER    Pt Name: Radhika Doty  MRN: [id number]  Birthdate 1980  Date of evaluation: 5/16/25  CHIEF COMPLAINT       Chief Complaint   Patient presents with    Medication Reaction     Just started taking Humira, now has pain and swelling to right FA     HISTORY OF PRESENT ILLNESS   Patient with past medical history of fibromyalgia, hyperlipidemia, asthma, arthritis presents to the ED for evaluation of redness, swelling, pain to her right upper forearm.  Patient states she was doing dishes this morning and noticed the area of redness, pain, swelling.  She denies any injury or any insect bite.  Patient is on methotrexate and Humira for her arthritis.  She is concerned that she could have a blood clot in this area.  Patient also states the past several days she has felt short of breath which is worse with exertion, dizzy/lightheaded, some \"weird feeling in her chest and throat.\"  She denies any fevers, sore throat, trouble swallowing, neck pain, chest pain, palpitations, leg pain or swelling, syncope, abdominal pain, vomiting.  She is not on blood thinners.  No other complaints.    The history is provided by the patient.           PASTMEDICAL HISTORY     Past Medical History:   Diagnosis Date    Arthritis     arthro both knees    Asthma     as a child & exercise induced    Borderline diabetes     Bursitis of hip     COVID-19 02/2023    Edema     Fibromyalgia     Hyperlipidemia     Low back pain     Osteoarthritis     PONV (postoperative nausea and vomiting)     itchy,vomit    Sciatica      Past Problem List  Patient Active Problem List   Diagnosis Code    Elevated fasting glucose R73.01    Moderate mixed hyperlipidemia not requiring statin therapy E78.2    Chronic pain of right knee M25.561, G89.29    Irritable bowel syndrome with diarrhea K58.0    Diarrhea R19.7    Gastroesophageal reflux disease K21.9    Moderate obesity E66.9    Nausea R11.0    Family history of colon polyps, unspecified

## 2025-05-16 NOTE — ED PROVIDER NOTES
[facility] EMERGENCY DEPARTMENT  eMERGENCY dEPARTMENT eNCOUnter   Independent Attestation     Pt Name: Radhika Doty  MRN: [id number]  Birthdate 1980  Date of evaluation: 5/16/25       Radhika Doty is a 44 y.o. female who presents with Medication Reaction (Just started taking Humira, now has pain and swelling to right FA)        Based on the medical record, the care appears appropriate. I was personally available for consultation in the Emergency Department.    Urban Lee MD  Attending Emergency  Physician                Urban Lee MD  05/16/25 [id number]

## 2025-05-17 LAB — ECHO BSA: 2.22 M2

## 2025-05-17 PROCEDURE — 93971 EXTREMITY STUDY: CPT | Performed by: SURGERY

## 2025-05-20 LAB
EKG ATRIAL RATE: 93 BPM
EKG P AXIS: 43 DEGREES
EKG P-R INTERVAL: 160 MS
EKG Q-T INTERVAL: 364 MS
EKG QRS DURATION: 76 MS
EKG QTC CALCULATION (BAZETT): 452 MS
EKG R AXIS: -1 DEGREES
EKG T AXIS: 10 DEGREES
EKG VENTRICULAR RATE: 93 BPM

## 2025-05-21 DIAGNOSIS — E66.01 SEVERE OBESITY (BMI >= 40) (HCC): ICD-10-CM

## 2025-05-21 RX ORDER — BUPROPION HYDROCHLORIDE 150 MG/1
150 TABLET ORAL EVERY MORNING
Qty: 90 TABLET | Refills: 0 | Status: SHIPPED | OUTPATIENT
Start: 2025-05-21

## 2025-05-26 SDOH — HEALTH STABILITY: PHYSICAL HEALTH: ON AVERAGE, HOW MANY DAYS PER WEEK DO YOU ENGAGE IN MODERATE TO STRENUOUS EXERCISE (LIKE A BRISK WALK)?: 7 DAYS

## 2025-05-26 SDOH — HEALTH STABILITY: PHYSICAL HEALTH: ON AVERAGE, HOW MANY MINUTES DO YOU ENGAGE IN EXERCISE AT THIS LEVEL?: 10 MIN

## 2025-05-27 ENCOUNTER — OFFICE VISIT (OUTPATIENT)
Dept: INTERNAL MEDICINE CLINIC | Age: 45
End: 2025-05-27
Payer: COMMERCIAL

## 2025-05-27 VITALS
WEIGHT: 248 LBS | DIASTOLIC BLOOD PRESSURE: 74 MMHG | HEIGHT: 62 IN | BODY MASS INDEX: 45.64 KG/M2 | OXYGEN SATURATION: 97 % | SYSTOLIC BLOOD PRESSURE: 126 MMHG | HEART RATE: 100 BPM

## 2025-05-27 DIAGNOSIS — E78.2 MODERATE MIXED HYPERLIPIDEMIA NOT REQUIRING STATIN THERAPY: ICD-10-CM

## 2025-05-27 DIAGNOSIS — E78.5 HYPERLIPIDEMIA, UNSPECIFIED HYPERLIPIDEMIA TYPE: Primary | ICD-10-CM

## 2025-05-27 DIAGNOSIS — E66.01 SEVERE OBESITY (BMI >= 40) (HCC): ICD-10-CM

## 2025-05-27 DIAGNOSIS — M06.00 SERONEGATIVE RHEUMATOID ARTHRITIS (HCC): ICD-10-CM

## 2025-05-27 DIAGNOSIS — M17.0 BILATERAL PRIMARY OSTEOARTHRITIS OF KNEE: ICD-10-CM

## 2025-05-27 DIAGNOSIS — E66.813 OBESITY, CLASS III, BMI 40-49.9 (MORBID OBESITY) (HCC): ICD-10-CM

## 2025-05-27 PROCEDURE — G8417 CALC BMI ABV UP PARAM F/U: HCPCS | Performed by: INTERNAL MEDICINE

## 2025-05-27 PROCEDURE — 1036F TOBACCO NON-USER: CPT | Performed by: INTERNAL MEDICINE

## 2025-05-27 PROCEDURE — 99214 OFFICE O/P EST MOD 30 MIN: CPT | Performed by: INTERNAL MEDICINE

## 2025-05-27 PROCEDURE — G8427 DOCREV CUR MEDS BY ELIG CLIN: HCPCS | Performed by: INTERNAL MEDICINE

## 2025-05-27 SDOH — ECONOMIC STABILITY: FOOD INSECURITY: WITHIN THE PAST 12 MONTHS, YOU WORRIED THAT YOUR FOOD WOULD RUN OUT BEFORE YOU GOT MONEY TO BUY MORE.: PATIENT DECLINED

## 2025-05-27 SDOH — ECONOMIC STABILITY: FOOD INSECURITY: WITHIN THE PAST 12 MONTHS, THE FOOD YOU BOUGHT JUST DIDN'T LAST AND YOU DIDN'T HAVE MONEY TO GET MORE.: PATIENT DECLINED

## 2025-05-27 ASSESSMENT — PATIENT HEALTH QUESTIONNAIRE - PHQ9
SUM OF ALL RESPONSES TO PHQ QUESTIONS 1-9: 0
2. FEELING DOWN, DEPRESSED OR HOPELESS: NOT AT ALL
SUM OF ALL RESPONSES TO PHQ QUESTIONS 1-9: 0
1. LITTLE INTEREST OR PLEASURE IN DOING THINGS: NOT AT ALL
SUM OF ALL RESPONSES TO PHQ QUESTIONS 1-9: 0
SUM OF ALL RESPONSES TO PHQ QUESTIONS 1-9: 0

## 2025-05-27 NOTE — PROGRESS NOTES
[facility]  [address] [address]  Dept: [phone]  Dept Fax: [phone]     Name: Radhika Doty  : 1980           Chief Complaint   Patient presents with    Established New Doctor       History of Present Illness  The patient presents for evaluation of seronegative rheumatoid arthritis, mild intermittent asthma, depression and anxiety, weight management, and pruritus.    She has been diagnosed with seronegative rheumatoid arthritis and is under the care of a rheumatologist, with an upcoming appointment on 2025. Her current treatment regimen includes Humira, administered every 14 days, and methotrexate. She also takes folic acid as part of her treatment plan.    She reports experiencing severe itching in her eyes since yesterday morning, which she attributes to allergies rather than her contact lenses. She is not currently on any allergy medication and has run out of her nasal spray.    She has a history of mild intermittent asthma and uses albuterol as needed.    She is on Wellbutrin for depression and anxiety, which has significantly improved her condition.    Her weight has remained stable for the past year. She maintains an active lifestyle, walking to work and engaging in physical activity during her work hours, although she experiences pain. Her diet is moderate, consisting of yogurt or fruit in the morning, occasional sandwiches for lunch, and a regular dinner. She expresses interest in weight loss medications such as Ozempic, but notes that her insurance does not cover it.    She has an appointment with OB/GYN at the beginning of 2025. She had a mammogram last year on 2024. She reports no chest pain or breathing trouble. She is sleeping well. She does sweat a lot even when it is cold, but she thinks it is from the pain.    She is also under the care of an orthopedic surgeon for her knees, with a scheduled follow-up in

## 2025-06-03 ENCOUNTER — HOSPITAL ENCOUNTER (OUTPATIENT)
Age: 45
Setting detail: SPECIMEN
Discharge: HOME | End: 2025-06-03

## 2025-06-03 ENCOUNTER — OFFICE VISIT (OUTPATIENT)
Dept: OBGYN CLINIC | Age: 45
End: 2025-06-03
Payer: COMMERCIAL

## 2025-06-03 ENCOUNTER — RESULTS FOLLOW-UP (OUTPATIENT)
Dept: OBGYN CLINIC | Age: 45
End: 2025-06-03

## 2025-06-03 VITALS
WEIGHT: 248 LBS | HEIGHT: 62 IN | SYSTOLIC BLOOD PRESSURE: 100 MMHG | BODY MASS INDEX: 45.64 KG/M2 | DIASTOLIC BLOOD PRESSURE: 62 MMHG

## 2025-06-03 DIAGNOSIS — N92.1 MENORRHAGIA WITH IRREGULAR CYCLE: ICD-10-CM

## 2025-06-03 DIAGNOSIS — Z80.0 FAMILY HISTORY OF COLON CANCER: Primary | ICD-10-CM

## 2025-06-03 DIAGNOSIS — Z01.419 WELL FEMALE EXAM WITH ROUTINE GYNECOLOGICAL EXAM: Primary | ICD-10-CM

## 2025-06-03 DIAGNOSIS — T83.32XA INTRAUTERINE CONTRACEPTIVE DEVICE THREADS LOST, INITIAL ENCOUNTER: ICD-10-CM

## 2025-06-03 DIAGNOSIS — Z12.31 ENCOUNTER FOR SCREENING MAMMOGRAM FOR MALIGNANT NEOPLASM OF BREAST: ICD-10-CM

## 2025-06-03 DIAGNOSIS — N92.6 IRREGULAR MENSES: ICD-10-CM

## 2025-06-03 DIAGNOSIS — Z11.51 SPECIAL SCREENING EXAMINATION FOR HUMAN PAPILLOMAVIRUS (HPV): ICD-10-CM

## 2025-06-03 LAB
B-HCG SERPL EIA 3RD IS-ACNC: <0.2 MIU/ML
BASOPHILS # BLD: 0.1 K/UL (ref 0–0.2)
BASOPHILS NFR BLD: 1 % (ref 0–2)
EOSINOPHIL # BLD: 0.19 K/UL (ref 0–0.44)
EOSINOPHILS RELATIVE PERCENT: 3 % (ref 1–4)
ERYTHROCYTE [DISTWIDTH] IN BLOOD BY AUTOMATED COUNT: 14.2 % (ref 11.8–14.4)
ESTRADIOL LEVEL: 76.6 PG/ML
FSH SERPL-ACNC: 7.4 MIU/ML
HCT VFR BLD AUTO: 40.3 % (ref 36.3–47.1)
HGB BLD-MCNC: 12.9 G/DL (ref 11.9–15.1)
IMM GRANULOCYTES # BLD AUTO: <0.03 K/UL (ref 0–0.3)
IMM GRANULOCYTES NFR BLD: 0 %
INR PPP: 0.9
LYMPHOCYTES NFR BLD: 2.79 K/UL (ref 1.1–3.7)
LYMPHOCYTES RELATIVE PERCENT: 38 % (ref 24–43)
MCH RBC QN AUTO: 28.7 PG (ref 25.2–33.5)
MCHC RBC AUTO-ENTMCNC: 32 G/DL (ref 28.4–34.8)
MCV RBC AUTO: 89.6 FL (ref 82.6–102.9)
MONOCYTES NFR BLD: 0.57 K/UL (ref 0.1–1.2)
MONOCYTES NFR BLD: 8 % (ref 3–12)
NEUTROPHILS NFR BLD: 50 % (ref 36–65)
NEUTS SEG NFR BLD: 3.63 K/UL (ref 1.5–8.1)
NRBC BLD-RTO: 0 PER 100 WBC
PARTIAL THROMBOPLASTIN TIME: 30.1 SEC (ref 23–36.5)
PLATELET # BLD AUTO: 486 K/UL (ref 138–453)
PMV BLD AUTO: 9.1 FL (ref 8.1–13.5)
PROTHROMBIN TIME: 12.8 SEC (ref 11.7–14.9)
RBC # BLD AUTO: 4.5 M/UL (ref 3.95–5.11)
TSH SERPL DL<=0.05 MIU/L-ACNC: 2.48 UIU/ML (ref 0.27–4.2)
WBC OTHER # BLD: 7.3 K/UL (ref 3.5–11.3)

## 2025-06-03 PROCEDURE — 99396 PREV VISIT EST AGE 40-64: CPT | Performed by: NURSE PRACTITIONER

## 2025-06-03 PROCEDURE — G8427 DOCREV CUR MEDS BY ELIG CLIN: HCPCS | Performed by: NURSE PRACTITIONER

## 2025-06-03 PROCEDURE — 1036F TOBACCO NON-USER: CPT | Performed by: NURSE PRACTITIONER

## 2025-06-03 PROCEDURE — G8417 CALC BMI ABV UP PARAM F/U: HCPCS | Performed by: NURSE PRACTITIONER

## 2025-06-03 PROCEDURE — 36415 COLL VENOUS BLD VENIPUNCTURE: CPT | Performed by: NURSE PRACTITIONER

## 2025-06-03 PROCEDURE — 99213 OFFICE O/P EST LOW 20 MIN: CPT | Performed by: NURSE PRACTITIONER

## 2025-06-03 ASSESSMENT — PATIENT HEALTH QUESTIONNAIRE - PHQ9
SUM OF ALL RESPONSES TO PHQ QUESTIONS 1-9: 0
SUM OF ALL RESPONSES TO PHQ QUESTIONS 1-9: 0
1. LITTLE INTEREST OR PLEASURE IN DOING THINGS: NOT AT ALL
SUM OF ALL RESPONSES TO PHQ QUESTIONS 1-9: 0
SUM OF ALL RESPONSES TO PHQ QUESTIONS 1-9: 0
2. FEELING DOWN, DEPRESSED OR HOPELESS: NOT AT ALL

## 2025-06-03 NOTE — PROGRESS NOTES
Patient was in the office today for a CBC TSH HCG PINR PTT.    Draw per physician order using sterile technique.  Drawn from the RIGHT AC.   
underlying CIN2-3 or cancer.        This test is intended for medical purposes only and is not valid for  the evaluation of suspected sexual abuse or for   other forensic purposes. Performed at [facility], [address] [place] [id number] [phone].               Source of Specimen:  A: Imaged ThinPrep Pap - Cervical (1 monolayer slide)    HPV Reflex?......................HPV Regardless      Clinical History  Z01.419 Routine gyn exam without abnormal findings  Z11.51 Encounter for screening for HPV  Co-Test:  ThinPrep Pap with high risk HPV testing  LMP:  08/25/2023  Processing Lab: [address] [address]  Interpretation performed at [facility], [address] [place] [address]  This Pap Test has been evaluated with the assistance of the ThinPrep  Pap Test Imaging System.  The Pap smear is a screening test primarily for squamous epithelial  lesions, which is subject to both false negative and false positive  results. Your patient should be reminded to consult you immediately if  she experiences any suspicious signs or symptoms, regardless of her  Pap smear result.  GYNECOLOGIC CYTOLOGY REPORT    Patient Name: WOJCIECH DELEON  [facility] Rec: [id number]  [facility]  [facility]  ANATOMIC PATHOLOGY  [address] [place] [address].  [place], [place] [address] [phone]  Fax: [phone]     ]  Abnormal Pap Smear History: denies  Colposcopy History:   Date of Last Mammogram: 2/14/2024 negative  Date of Last Colonoscopy: 3/5/2024 colonoscopy repeat in 10 years  Date of Last Bone Density: NA      ________________________________________________________________________        REVIEW OF SYSTEMS:    yes   A minimum of an eleven point review of systems was completed.    Review Of Systems (11 point):  Constitutional: No fever, chills or malaise; No weight change or fatigue  Head and Eyes: No vision changes, Headache,

## 2025-06-06 RX ORDER — LORATADINE 10 MG/1
10 TABLET ORAL DAILY
Qty: 30 TABLET | Refills: 0 | Status: SHIPPED | OUTPATIENT
Start: 2025-06-06

## 2025-06-10 LAB — CYTOLOGY REPORT: NORMAL

## 2025-06-12 ENCOUNTER — OFFICE VISIT (OUTPATIENT)
Age: 45
End: 2025-06-12
Payer: COMMERCIAL

## 2025-06-12 VITALS
HEIGHT: 62 IN | TEMPERATURE: 97.3 F | SYSTOLIC BLOOD PRESSURE: 110 MMHG | OXYGEN SATURATION: 98 % | WEIGHT: 249.6 LBS | BODY MASS INDEX: 45.93 KG/M2 | DIASTOLIC BLOOD PRESSURE: 68 MMHG | HEART RATE: 82 BPM

## 2025-06-12 DIAGNOSIS — M06.00 SERONEGATIVE RHEUMATOID ARTHRITIS (HCC): Primary | ICD-10-CM

## 2025-06-12 DIAGNOSIS — M19.90 OSTEOARTHRITIS, UNSPECIFIED OSTEOARTHRITIS TYPE, UNSPECIFIED SITE: ICD-10-CM

## 2025-06-12 DIAGNOSIS — Z79.899 LONG-TERM USE OF HIGH-RISK MEDICATION: ICD-10-CM

## 2025-06-12 PROCEDURE — 99214 OFFICE O/P EST MOD 30 MIN: CPT | Performed by: STUDENT IN AN ORGANIZED HEALTH CARE EDUCATION/TRAINING PROGRAM

## 2025-06-12 PROCEDURE — G2211 COMPLEX E/M VISIT ADD ON: HCPCS | Performed by: STUDENT IN AN ORGANIZED HEALTH CARE EDUCATION/TRAINING PROGRAM

## 2025-06-12 PROCEDURE — G8417 CALC BMI ABV UP PARAM F/U: HCPCS | Performed by: STUDENT IN AN ORGANIZED HEALTH CARE EDUCATION/TRAINING PROGRAM

## 2025-06-12 PROCEDURE — 99213 OFFICE O/P EST LOW 20 MIN: CPT | Performed by: STUDENT IN AN ORGANIZED HEALTH CARE EDUCATION/TRAINING PROGRAM

## 2025-06-12 PROCEDURE — G8427 DOCREV CUR MEDS BY ELIG CLIN: HCPCS | Performed by: STUDENT IN AN ORGANIZED HEALTH CARE EDUCATION/TRAINING PROGRAM

## 2025-06-12 PROCEDURE — 1036F TOBACCO NON-USER: CPT | Performed by: STUDENT IN AN ORGANIZED HEALTH CARE EDUCATION/TRAINING PROGRAM

## 2025-06-12 RX ORDER — CELECOXIB 200 MG/1
200 CAPSULE ORAL 2 TIMES DAILY
Qty: 60 CAPSULE | Refills: 3 | Status: SHIPPED | OUTPATIENT
Start: 2025-06-12

## 2025-06-12 NOTE — PROGRESS NOTES
Review of Systems   Constitutional:  Positive for appetite change, diaphoresis and fatigue.   Musculoskeletal:  Positive for arthralgias, gait problem, joint swelling and myalgias.   Neurological:  Positive for dizziness.   All other systems reviewed and are negative.      
Connections: Not on file   Intimate Partner Violence: Unknown (2/23/2024)    [facility]    [place] [facility]     Fear of Current or Ex-Partner: Not on file     Emotionally Abused: Not on file     Physically Abused: Not on file     Sexually Abused: Not on file     Physically or Sexually Abused: Not on file   Housing Stability: Patient Declined (5/27/2025)    Housing Stability Vital Sign     Unable to Pay for Housing in the Last Year: Patient declined     Number of Times Moved in the Last Year: 0     Homeless in the Last Year: Patient declined         REVIEW OF SYSTEMS:    Review of Systems  Constitutional:  Positive for appetite change, diaphoresis and fatigue.   Musculoskeletal:  Positive for arthralgias, gait problem, joint swelling and myalgias.   Neurological:  Positive for dizziness.   All other systems reviewed and are negative    PHYSICAL EXAMINATION:     /68   Pulse 82   Temp 97.3 °F (36.3 °C)   Ht 1.575 m (5' 2\")   Wt 113.2 kg (249 lb 9.6 oz)   LMP 06/12/2025 (Exact Date)   SpO2 98%   BMI 45.65 kg/m²     Physical Exam  Constitutional:       Appearance: Normal appearance.   HENT:      Head: Normocephalic and atraumatic.      Nose: Nose normal.      Mouth/Throat:      Mouth: Mucous membranes are moist.   Eyes:      Extraocular Movements: Extraocular movements intact.   Pulmonary:      Effort: Pulmonary effort is normal.   Musculoskeletal:         General: No swelling or tenderness.      Cervical back: Normal range of motion.   Neurological:      Mental Status: She is alert.           LABORATORY DATA: Reviewed  Lab Results   Component Value Date    WBC 7.3 06/03/2025    HGB 12.9 06/03/2025    HCT 40.3 06/03/2025    MCV 89.6 06/03/2025     (H) 06/03/2025     05/16/2025    K 3.6 (L) 05/16/2025     05/16/2025    CO2 23 05/16/2025    BUN 10 05/16/2025    CREATININE 0.7 05/16/2025    BILITOT 0.4 05/16/2025    ALKPHOS 90

## 2025-06-18 ENCOUNTER — TELEPHONE (OUTPATIENT)
Age: 45
End: 2025-06-18

## 2025-06-18 NOTE — TELEPHONE ENCOUNTER
Patient was evaluated this month already. Can cancel the upcoming appointment this Friday. When evaluated on 6/12/2025 plan was to follow-up in 3 months unless patient has any concerns that she wants to discuss on her appointment that she has this week. Please check with patient prior to canceling the appointment this week       Pt was called and agreed, 6/20/25 cancelled, Pt wanting to keep her Sept. Appointment

## 2025-06-23 RX ORDER — OMEPRAZOLE 20 MG/1
CAPSULE, DELAYED RELEASE ORAL
Qty: 180 CAPSULE | Refills: 0 | Status: SHIPPED | OUTPATIENT
Start: 2025-06-23

## 2025-07-16 ENCOUNTER — TELEPHONE (OUTPATIENT)
Dept: INTERNAL MEDICINE CLINIC | Age: 45
End: 2025-07-16

## 2025-07-31 ENCOUNTER — TELEPHONE (OUTPATIENT)
Age: 45
End: 2025-07-31

## 2025-07-31 NOTE — TELEPHONE ENCOUNTER
Pt LVM stating that she was contacted by the pharmacy regarding her Humira stating that they need another PA for her to receive her refill, it shows that the PA has already been approved.

## 2025-08-15 DIAGNOSIS — E66.01 SEVERE OBESITY (BMI >= 40) (HCC): ICD-10-CM

## 2025-08-18 RX ORDER — BUPROPION HYDROCHLORIDE 150 MG/1
150 TABLET ORAL EVERY MORNING
Qty: 90 TABLET | Refills: 0 | Status: SHIPPED | OUTPATIENT
Start: 2025-08-18

## (undated) DEVICE — PADDING UNDERCAST W6INXL4YD RAYON POLY SYN NONADHESIVE

## (undated) DEVICE — SUTURE ETHLN SZ 3-0 L18IN NONABSORBABLE BLK FS-1 L24MM 3/8 663H

## (undated) DEVICE — BITEBLOCK ENDOSCP 60FR MAXI WHT POLYETH STURDY W/ VELC WVN

## (undated) DEVICE — [TOMCAT CUTTER, ARTHROSCOPIC SHAVER BLADE,  DO NOT RESTERILIZE,  DO NOT USE IF PACKAGE IS DAMAGED,  KEEP DRY,  KEEP AWAY FROM SUNLIGHT]: Brand: FORMULA

## (undated) DEVICE — GOWN,AURORA,NONREINFORCED,LARGE: Brand: MEDLINE

## (undated) DEVICE — STAZ ENDO KIT: Brand: MEDLINE INDUSTRIES, INC.

## (undated) DEVICE — YANKAUER,FLEXIBLE HANDLE,REGLR CAPACITY: Brand: MEDLINE INDUSTRIES, INC.

## (undated) DEVICE — DRESSING,GAUZE,XEROFORM,CURAD,1"X8",ST: Brand: CURAD

## (undated) DEVICE — PACK ARTHRO W PCH

## (undated) DEVICE — ZIMMER® STERILE DISPOSABLE TOURNIQUET CUFF WITH PLC, DUAL PORT, SINGLE BLADDER, 30 IN. (76 CM)

## (undated) DEVICE — MERCY HEALTH ST CHARLES: Brand: MEDLINE INDUSTRIES, INC.

## (undated) DEVICE — SINGLE PORT MANIFOLD: Brand: NEPTUNE 2

## (undated) DEVICE — SOLUTION IV IRRIG LACTATED RINGERS 3000ML 2B7487

## (undated) DEVICE — FORCEPS BX L240CM JAW DIA2.8MM L CAP W/ NDL MIC MESH TOOTH

## (undated) DEVICE — GLOVE SURG 8 11.7IN BEAD CUF LIGHT BRN SENSICARE LTX FREE

## (undated) DEVICE — GLOVE ORTHO 8   MSG9480